# Patient Record
Sex: FEMALE | Race: WHITE | NOT HISPANIC OR LATINO | URBAN - METROPOLITAN AREA
[De-identification: names, ages, dates, MRNs, and addresses within clinical notes are randomized per-mention and may not be internally consistent; named-entity substitution may affect disease eponyms.]

---

## 2017-06-02 ENCOUNTER — EMERGENCY (EMERGENCY)
Facility: HOSPITAL | Age: 62
LOS: 1 days | Discharge: HOME | End: 2017-06-02
Attending: EMERGENCY MEDICINE | Admitting: EMERGENCY MEDICINE
Payer: COMMERCIAL

## 2017-06-02 VITALS
HEART RATE: 76 BPM | OXYGEN SATURATION: 96 % | DIASTOLIC BLOOD PRESSURE: 72 MMHG | RESPIRATION RATE: 14 BRPM | WEIGHT: 141.98 LBS | SYSTOLIC BLOOD PRESSURE: 136 MMHG | TEMPERATURE: 98 F

## 2017-06-02 DIAGNOSIS — Z90.49 ACQUIRED ABSENCE OF OTHER SPECIFIED PARTS OF DIGESTIVE TRACT: Chronic | ICD-10-CM

## 2017-06-02 DIAGNOSIS — J45.909 UNSPECIFIED ASTHMA, UNCOMPLICATED: ICD-10-CM

## 2017-06-02 DIAGNOSIS — Z98.89 OTHER SPECIFIED POSTPROCEDURAL STATES: Chronic | ICD-10-CM

## 2017-06-02 DIAGNOSIS — Z82.49 FAMILY HISTORY OF ISCHEMIC HEART DISEASE AND OTHER DISEASES OF THE CIRCULATORY SYSTEM: ICD-10-CM

## 2017-06-02 DIAGNOSIS — R10.30 LOWER ABDOMINAL PAIN, UNSPECIFIED: ICD-10-CM

## 2017-06-02 DIAGNOSIS — Z79.84 LONG TERM (CURRENT) USE OF ORAL HYPOGLYCEMIC DRUGS: ICD-10-CM

## 2017-06-02 DIAGNOSIS — E11.9 TYPE 2 DIABETES MELLITUS WITHOUT COMPLICATIONS: ICD-10-CM

## 2017-06-02 DIAGNOSIS — R10.32 LEFT LOWER QUADRANT PAIN: ICD-10-CM

## 2017-06-02 LAB
ALBUMIN SERPL ELPH-MCNC: 3.7 G/DL — SIGNIFICANT CHANGE UP (ref 3.3–5)
ALP SERPL-CCNC: 82 U/L — SIGNIFICANT CHANGE UP (ref 40–120)
ALT FLD-CCNC: 22 U/L — SIGNIFICANT CHANGE UP (ref 12–78)
AMYLASE P1 CFR SERPL: 32 U/L — SIGNIFICANT CHANGE UP (ref 25–115)
ANION GAP SERPL CALC-SCNC: 5 MMOL/L — SIGNIFICANT CHANGE UP (ref 5–17)
APPEARANCE UR: CLEAR — SIGNIFICANT CHANGE UP
APTT BLD: 33.7 SEC — SIGNIFICANT CHANGE UP (ref 27.5–37.4)
AST SERPL-CCNC: 11 U/L — LOW (ref 15–37)
BACTERIA # UR AUTO: ABNORMAL
BASOPHILS # BLD AUTO: 0.2 K/UL — SIGNIFICANT CHANGE UP (ref 0–0.2)
BASOPHILS NFR BLD AUTO: 1.6 % — SIGNIFICANT CHANGE UP (ref 0–2)
BILIRUB SERPL-MCNC: 0.2 MG/DL — SIGNIFICANT CHANGE UP (ref 0.2–1.2)
BILIRUB UR-MCNC: NEGATIVE — SIGNIFICANT CHANGE UP
BUN SERPL-MCNC: 16 MG/DL — SIGNIFICANT CHANGE UP (ref 7–23)
CALCIUM SERPL-MCNC: 9.1 MG/DL — SIGNIFICANT CHANGE UP (ref 8.5–10.1)
CHLORIDE SERPL-SCNC: 105 MMOL/L — SIGNIFICANT CHANGE UP (ref 96–108)
CO2 SERPL-SCNC: 29 MMOL/L — SIGNIFICANT CHANGE UP (ref 22–31)
COLOR SPEC: YELLOW — SIGNIFICANT CHANGE UP
CREAT SERPL-MCNC: 0.78 MG/DL — SIGNIFICANT CHANGE UP (ref 0.5–1.3)
DIFF PNL FLD: NEGATIVE — SIGNIFICANT CHANGE UP
EOSINOPHIL # BLD AUTO: 0.3 K/UL — SIGNIFICANT CHANGE UP (ref 0–0.5)
EOSINOPHIL NFR BLD AUTO: 2.6 % — SIGNIFICANT CHANGE UP (ref 0–6)
EPI CELLS # UR: SIGNIFICANT CHANGE UP
GLUCOSE SERPL-MCNC: 235 MG/DL — HIGH (ref 70–99)
GLUCOSE UR QL: 1000 MG/DL
HCT VFR BLD CALC: 42.5 % — SIGNIFICANT CHANGE UP (ref 34.5–45)
HGB BLD-MCNC: 13.9 G/DL — SIGNIFICANT CHANGE UP (ref 11.5–15.5)
INR BLD: 0.9 RATIO — SIGNIFICANT CHANGE UP (ref 0.88–1.16)
KETONES UR-MCNC: NEGATIVE — SIGNIFICANT CHANGE UP
LEUKOCYTE ESTERASE UR-ACNC: ABNORMAL
LIDOCAIN IGE QN: 143 U/L — SIGNIFICANT CHANGE UP (ref 73–393)
LYMPHOCYTES # BLD AUTO: 2 K/UL — SIGNIFICANT CHANGE UP (ref 1–3.3)
LYMPHOCYTES # BLD AUTO: 20.5 % — SIGNIFICANT CHANGE UP (ref 13–44)
MCHC RBC-ENTMCNC: 28.4 PG — SIGNIFICANT CHANGE UP (ref 27–34)
MCHC RBC-ENTMCNC: 32.6 GM/DL — SIGNIFICANT CHANGE UP (ref 32–36)
MCV RBC AUTO: 86.9 FL — SIGNIFICANT CHANGE UP (ref 80–100)
MONOCYTES # BLD AUTO: 0.7 K/UL — SIGNIFICANT CHANGE UP (ref 0–0.9)
MONOCYTES NFR BLD AUTO: 6.6 % — SIGNIFICANT CHANGE UP (ref 1–9)
NEUTROPHILS # BLD AUTO: 6.9 K/UL — SIGNIFICANT CHANGE UP (ref 1.8–7.4)
NEUTROPHILS NFR BLD AUTO: 68.6 % — SIGNIFICANT CHANGE UP (ref 43–77)
NITRITE UR-MCNC: NEGATIVE — SIGNIFICANT CHANGE UP
PH UR: 6 — SIGNIFICANT CHANGE UP (ref 5–8)
PLATELET # BLD AUTO: 312 K/UL — SIGNIFICANT CHANGE UP (ref 150–400)
POTASSIUM SERPL-MCNC: 4 MMOL/L — SIGNIFICANT CHANGE UP (ref 3.5–5.3)
POTASSIUM SERPL-SCNC: 4 MMOL/L — SIGNIFICANT CHANGE UP (ref 3.5–5.3)
PROT SERPL-MCNC: 7 G/DL — SIGNIFICANT CHANGE UP (ref 6–8.3)
PROT UR-MCNC: NEGATIVE — SIGNIFICANT CHANGE UP
PROTHROM AB SERPL-ACNC: 9.8 SEC — SIGNIFICANT CHANGE UP (ref 9.8–12.7)
RBC # BLD: 4.89 M/UL — SIGNIFICANT CHANGE UP (ref 3.8–5.2)
RBC # FLD: 12 % — SIGNIFICANT CHANGE UP (ref 10.3–14.5)
SODIUM SERPL-SCNC: 139 MMOL/L — SIGNIFICANT CHANGE UP (ref 135–145)
SP GR SPEC: 1.01 — SIGNIFICANT CHANGE UP (ref 1.01–1.02)
UROBILINOGEN FLD QL: NEGATIVE — SIGNIFICANT CHANGE UP
WBC # BLD: 10 K/UL — SIGNIFICANT CHANGE UP (ref 3.8–10.5)
WBC # FLD AUTO: 10 K/UL — SIGNIFICANT CHANGE UP (ref 3.8–10.5)
WBC UR QL: ABNORMAL

## 2017-06-02 PROCEDURE — 99285 EMERGENCY DEPT VISIT HI MDM: CPT

## 2017-06-02 PROCEDURE — 74176 CT ABD & PELVIS W/O CONTRAST: CPT | Mod: 26

## 2017-06-02 RX ORDER — IOHEXOL 300 MG/ML
30 INJECTION, SOLUTION INTRAVENOUS ONCE
Qty: 0 | Refills: 0 | Status: COMPLETED | OUTPATIENT
Start: 2017-06-02 | End: 2017-06-02

## 2017-06-02 RX ORDER — MORPHINE SULFATE 50 MG/1
4 CAPSULE, EXTENDED RELEASE ORAL ONCE
Qty: 0 | Refills: 0 | Status: DISCONTINUED | OUTPATIENT
Start: 2017-06-02 | End: 2017-06-02

## 2017-06-02 RX ORDER — SODIUM CHLORIDE 9 MG/ML
1000 INJECTION INTRAMUSCULAR; INTRAVENOUS; SUBCUTANEOUS ONCE
Qty: 0 | Refills: 0 | Status: COMPLETED | OUTPATIENT
Start: 2017-06-02 | End: 2017-06-02

## 2017-06-02 RX ORDER — ONDANSETRON 8 MG/1
4 TABLET, FILM COATED ORAL ONCE
Qty: 0 | Refills: 0 | Status: COMPLETED | OUTPATIENT
Start: 2017-06-02 | End: 2017-06-02

## 2017-06-02 RX ADMIN — ONDANSETRON 4 MILLIGRAM(S): 8 TABLET, FILM COATED ORAL at 21:00

## 2017-06-02 RX ADMIN — MORPHINE SULFATE 4 MILLIGRAM(S): 50 CAPSULE, EXTENDED RELEASE ORAL at 21:00

## 2017-06-02 RX ADMIN — IOHEXOL 30 MILLILITER(S): 300 INJECTION, SOLUTION INTRAVENOUS at 21:00

## 2017-06-02 RX ADMIN — SODIUM CHLORIDE 1000 MILLILITER(S): 9 INJECTION INTRAMUSCULAR; INTRAVENOUS; SUBCUTANEOUS at 21:00

## 2017-06-02 NOTE — ED ADULT NURSE NOTE - OBJECTIVE STATEMENT
patient c/o LLQ pain that radiates across abdomen to right with severe nausea. denies diarrhea or vomiting

## 2017-06-03 VITALS
RESPIRATION RATE: 16 BRPM | DIASTOLIC BLOOD PRESSURE: 70 MMHG | OXYGEN SATURATION: 96 % | HEART RATE: 64 BPM | SYSTOLIC BLOOD PRESSURE: 109 MMHG | TEMPERATURE: 97 F

## 2017-06-03 LAB
CULTURE RESULTS: NO GROWTH — SIGNIFICANT CHANGE UP
SPECIMEN SOURCE: SIGNIFICANT CHANGE UP

## 2017-06-03 PROCEDURE — 80053 COMPREHEN METABOLIC PANEL: CPT

## 2017-06-03 PROCEDURE — 96375 TX/PRO/DX INJ NEW DRUG ADDON: CPT

## 2017-06-03 PROCEDURE — 85610 PROTHROMBIN TIME: CPT

## 2017-06-03 PROCEDURE — 74176 CT ABD & PELVIS W/O CONTRAST: CPT

## 2017-06-03 PROCEDURE — 83690 ASSAY OF LIPASE: CPT

## 2017-06-03 PROCEDURE — 81001 URINALYSIS AUTO W/SCOPE: CPT

## 2017-06-03 PROCEDURE — 99284 EMERGENCY DEPT VISIT MOD MDM: CPT | Mod: 25

## 2017-06-03 PROCEDURE — 96374 THER/PROPH/DIAG INJ IV PUSH: CPT

## 2017-06-03 PROCEDURE — 87086 URINE CULTURE/COLONY COUNT: CPT

## 2017-06-03 PROCEDURE — 82150 ASSAY OF AMYLASE: CPT

## 2017-06-03 PROCEDURE — 85730 THROMBOPLASTIN TIME PARTIAL: CPT

## 2017-06-03 PROCEDURE — 85027 COMPLETE CBC AUTOMATED: CPT

## 2017-06-03 RX ORDER — CEFOXITIN 1 G/1
1 INJECTION, POWDER, FOR SOLUTION INTRAVENOUS
Qty: 14 | Refills: 0 | OUTPATIENT
Start: 2017-06-03 | End: 2017-06-10

## 2017-06-03 RX ORDER — ONDANSETRON 8 MG/1
1 TABLET, FILM COATED ORAL
Qty: 9 | Refills: 0 | OUTPATIENT
Start: 2017-06-03 | End: 2017-06-06

## 2017-06-03 RX ORDER — CEFUROXIME AXETIL 250 MG
500 TABLET ORAL EVERY 12 HOURS
Qty: 0 | Refills: 0 | Status: DISCONTINUED | OUTPATIENT
Start: 2017-06-03 | End: 2017-06-06

## 2017-06-03 RX ORDER — ONDANSETRON 8 MG/1
4 TABLET, FILM COATED ORAL ONCE
Qty: 0 | Refills: 0 | Status: COMPLETED | OUTPATIENT
Start: 2017-06-03 | End: 2017-06-03

## 2017-06-03 RX ADMIN — ONDANSETRON 4 MILLIGRAM(S): 8 TABLET, FILM COATED ORAL at 01:39

## 2017-06-03 RX ADMIN — MORPHINE SULFATE 4 MILLIGRAM(S): 50 CAPSULE, EXTENDED RELEASE ORAL at 00:53

## 2017-06-03 RX ADMIN — Medication 500 MILLIGRAM(S): at 01:39

## 2017-06-03 NOTE — ED PROVIDER NOTE - CHPI ED SYMPTOMS NEG
no hematuria/no abdominal distension/no burning urination/no chills/no fever/no vomiting/no diarrhea/no dysuria

## 2017-06-03 NOTE — ED PROVIDER NOTE - OBJECTIVE STATEMENT
Pt is a 63 yo female pw b/l lower abd pain since 4am, pain greater on left than right, no fc, + nausea. no a/a factors, pain has become severe so pt came to er.

## 2017-06-03 NOTE — ED ADULT NURSE REASSESSMENT NOTE - NS ED NURSE REASSESS COMMENT FT1
patient looks much improved and states to feel much better and ready to go home. Discussed with patient to f/u with PMD and GI asap, states she will call monday. Tolerated PO well, v/s stable.

## 2017-06-03 NOTE — ED PROVIDER NOTE - MEDICAL DECISION MAKING DETAILS
Pt is a 63 yo diabetic with llq abd pain for 24 hour and several days nausea.  ct neg, + ua.  abx , zofran fu gi

## 2018-09-21 ENCOUNTER — EMERGENCY (EMERGENCY)
Facility: HOSPITAL | Age: 63
LOS: 1 days | Discharge: HOME | End: 2018-09-21
Attending: EMERGENCY MEDICINE
Payer: MEDICARE

## 2018-09-21 VITALS
SYSTOLIC BLOOD PRESSURE: 144 MMHG | HEART RATE: 67 BPM | OXYGEN SATURATION: 97 % | TEMPERATURE: 98 F | DIASTOLIC BLOOD PRESSURE: 72 MMHG | RESPIRATION RATE: 18 BRPM

## 2018-09-21 DIAGNOSIS — Z90.49 ACQUIRED ABSENCE OF OTHER SPECIFIED PARTS OF DIGESTIVE TRACT: Chronic | ICD-10-CM

## 2018-09-21 DIAGNOSIS — Z98.89 OTHER SPECIFIED POSTPROCEDURAL STATES: Chronic | ICD-10-CM

## 2018-09-21 PROCEDURE — 71101 X-RAY EXAM UNILAT RIBS/CHEST: CPT

## 2018-09-21 PROCEDURE — 99284 EMERGENCY DEPT VISIT MOD MDM: CPT | Mod: 25

## 2018-09-21 PROCEDURE — 71101 X-RAY EXAM UNILAT RIBS/CHEST: CPT | Mod: 26

## 2018-09-21 PROCEDURE — 99284 EMERGENCY DEPT VISIT MOD MDM: CPT

## 2018-09-21 RX ORDER — LIDOCAINE 4 G/100G
1 CREAM TOPICAL ONCE
Qty: 0 | Refills: 0 | Status: COMPLETED | OUTPATIENT
Start: 2018-09-21 | End: 2018-09-21

## 2018-09-21 RX ORDER — LIDOCAINE 4 G/100G
1 CREAM TOPICAL
Qty: 7 | Refills: 0 | OUTPATIENT
Start: 2018-09-21 | End: 2018-09-27

## 2018-09-21 RX ORDER — IBUPROFEN 200 MG
600 TABLET ORAL ONCE
Qty: 0 | Refills: 0 | Status: COMPLETED | OUTPATIENT
Start: 2018-09-21 | End: 2018-09-21

## 2018-09-21 RX ORDER — CYCLOBENZAPRINE HYDROCHLORIDE 10 MG/1
1 TABLET, FILM COATED ORAL
Qty: 15 | Refills: 0 | OUTPATIENT
Start: 2018-09-21 | End: 2018-09-25

## 2018-09-21 RX ORDER — ACETAMINOPHEN 500 MG
650 TABLET ORAL ONCE
Qty: 0 | Refills: 0 | Status: COMPLETED | OUTPATIENT
Start: 2018-09-21 | End: 2018-09-21

## 2018-09-21 RX ORDER — BUDESONIDE AND FORMOTEROL FUMARATE DIHYDRATE 160; 4.5 UG/1; UG/1
2 AEROSOL RESPIRATORY (INHALATION)
Qty: 0 | Refills: 0 | COMMUNITY

## 2018-09-21 RX ADMIN — Medication 600 MILLIGRAM(S): at 07:51

## 2018-09-21 RX ADMIN — LIDOCAINE 1 PATCH: 4 CREAM TOPICAL at 07:52

## 2018-09-21 RX ADMIN — Medication 650 MILLIGRAM(S): at 07:51

## 2018-09-21 NOTE — ED ADULT TRIAGE NOTE - CHIEF COMPLAINT QUOTE
patient slipped getting out of a pool last sunday when she fell onto her left ribs c/o pain getting worse

## 2018-09-21 NOTE — ED ADULT NURSE NOTE - CHPI ED NUR SYMPTOMS NEG
no weakness/no difficulty bearing weight/no tingling/no fever/no abrasion/no back pain/no deformity/no stiffness/no bruising/no numbness

## 2018-09-21 NOTE — ED ADULT NURSE NOTE - CHIEF COMPLAINT QUOTE
patient slipped getting out of a pool last sunday when she fell onto her left ribs c/o pain getting worse, also c/o rash to neck and arm

## 2018-09-21 NOTE — ED PROVIDER NOTE - CARE PLAN
Principal Discharge DX:	Muscle strain Principal Discharge DX:	Muscle strain  Secondary Diagnosis:	Dermatitis

## 2018-09-21 NOTE — ED ADULT NURSE NOTE - OBJECTIVE STATEMENT
63 year old female presents to the ED complaining of rib pain. As per patient she fell while in the pool 5 days ago. Patient hit her left side and is complaining of persistent, worsening left lower rib pain. Today patient reports pain heightened which brought her to the ED for further evaluation. Ambulatory with a steady gait. Denies chest pain/shortness of breath. Denies nausea/vomiting. Patient applied heating pad for relief and also has been taken advil and tylenol - some mild relief only.

## 2018-09-21 NOTE — ED PROVIDER NOTE - PROGRESS NOTE DETAILS
All results were explained to patient and/or family and a copy of all available results given. All results were explained to patient and/or family and a copy of all available results given.  Advise cortisone otc and miguel angel c dermatoloist within 48 hours.

## 2018-09-21 NOTE — ED PROVIDER NOTE - OBJECTIVE STATEMENT
left lower rib and lat side x  5 days while trying to jump out of the pool and hit against the edge.   pt has been using Icy and heat pad , Advil and Tylenol c initial improvement, but got worse yesterday. left lower rib and lat side x  5 days while trying to jump out of the pool and hit against the edge.   pt has been using Icy and heat pad , Advil and Tylenol c initial improvement, but got worse yesterday.  pt also complained of itchy scalp rash intermittently for 6 months .  no other complaint.

## 2018-09-21 NOTE — ED ADULT NURSE NOTE - NSIMPLEMENTINTERV_GEN_ALL_ED
Implemented All Fall Risk Interventions:  [place] to call system. Call bell, personal items and telephone within reach. Instruct patient to call for assistance. Room bathroom lighting operational. Non-slip footwear when patient is off stretcher. Physically safe environment: no spills, clutter or unnecessary equipment. Stretcher in lowest position, wheels locked, appropriate side rails in place. Provide visual cue, wrist band, yellow gown, etc. Monitor gait and stability. Monitor for mental status changes and reorient to person, place, and time. Review medications for side effects contributing to fall risk. Reinforce activity limits and safety measures with patient and family.

## 2018-11-11 ENCOUNTER — EMERGENCY (EMERGENCY)
Facility: HOSPITAL | Age: 63
LOS: 1 days | Discharge: HOME | End: 2018-11-11
Attending: EMERGENCY MEDICINE
Payer: MEDICARE

## 2018-11-11 VITALS
RESPIRATION RATE: 15 BRPM | HEIGHT: 67 IN | SYSTOLIC BLOOD PRESSURE: 146 MMHG | WEIGHT: 147.93 LBS | HEART RATE: 81 BPM | OXYGEN SATURATION: 97 % | TEMPERATURE: 98 F | DIASTOLIC BLOOD PRESSURE: 82 MMHG

## 2018-11-11 VITALS
SYSTOLIC BLOOD PRESSURE: 140 MMHG | TEMPERATURE: 98 F | HEART RATE: 80 BPM | OXYGEN SATURATION: 97 % | RESPIRATION RATE: 16 BRPM | DIASTOLIC BLOOD PRESSURE: 79 MMHG

## 2018-11-11 DIAGNOSIS — Z90.49 ACQUIRED ABSENCE OF OTHER SPECIFIED PARTS OF DIGESTIVE TRACT: Chronic | ICD-10-CM

## 2018-11-11 DIAGNOSIS — Z98.89 OTHER SPECIFIED POSTPROCEDURAL STATES: Chronic | ICD-10-CM

## 2018-11-11 PROCEDURE — 73090 X-RAY EXAM OF FOREARM: CPT | Mod: 26,LT

## 2018-11-11 PROCEDURE — 29125 APPL SHORT ARM SPLINT STATIC: CPT

## 2018-11-11 PROCEDURE — 73090 X-RAY EXAM OF FOREARM: CPT

## 2018-11-11 PROCEDURE — 73110 X-RAY EXAM OF WRIST: CPT | Mod: 26,RT

## 2018-11-11 PROCEDURE — 29125 APPL SHORT ARM SPLINT STATIC: CPT | Mod: RT

## 2018-11-11 PROCEDURE — 99283 EMERGENCY DEPT VISIT LOW MDM: CPT

## 2018-11-11 PROCEDURE — 73110 X-RAY EXAM OF WRIST: CPT

## 2018-11-11 PROCEDURE — 99284 EMERGENCY DEPT VISIT MOD MDM: CPT | Mod: 25

## 2018-11-11 RX ORDER — IBUPROFEN 200 MG
600 TABLET ORAL ONCE
Qty: 0 | Refills: 0 | Status: COMPLETED | OUTPATIENT
Start: 2018-11-11 | End: 2018-11-11

## 2018-11-11 RX ADMIN — Medication 600 MILLIGRAM(S): at 13:28

## 2018-11-11 RX ADMIN — Medication 600 MILLIGRAM(S): at 13:58

## 2018-11-11 NOTE — ED PROVIDER NOTE - OBJECTIVE STATEMENT
presents with right forearm and wrist pain. tripped over a gate at home about 1 hour ago and landed on right wrist and forearm. pain 7/10. has not taken anything for pain or symptoms. denies hitting head or loc. does not take any blood thinners. no HA, dizziness, confusion, memory loss, neck or back pain. right handed  PCP Lb Sol

## 2018-11-11 NOTE — ED PROVIDER NOTE - MEDICAL DECISION MAKING DETAILS
Josselin: See attending statement below Josselin: See attending statement below  right wrist pain and forearm pain after fall. will x-ray. no vert tenderness to suggest vert fx. no Ha or neuro deficits. do not suspect ICH or skull fx. will give motrin for pain. due to tenderness over anatomical snuffbox, will plan to apply thumb spica splint

## 2018-11-11 NOTE — ED PROVIDER NOTE - ATTENDING CONTRIBUTION TO CARE
63y F here with R wrist forearm pain sp trip and fall landing on outstretched R hand. Pt c/o pain and swelling to R wrist greatest along distal ulnar aspect. Pain is worse w mvmt and palp. No other injuries. Periph NV intact. dorsum R hand with circular area of swelling which appears to move along with flexion/ext of digits (suspect ganglion cyst) which pt states present prior to fall. Pain extends from wrost tomid forearm. Will obtain Xrays to R/o fracture. Will give hand FU for eval of suspected cyst.

## 2018-11-11 NOTE — ED ADULT NURSE NOTE - OBJECTIVE STATEMENT
Pt presents to the fast track area s/p trip and fall c/o right wrist pain, + sensation, + tenderness, + mobility of the fingers, + capillary refill < 2 sec, + radial pulses, skin warm and dry.

## 2018-11-11 NOTE — ED ADULT TRIAGE NOTE - CHIEF COMPLAINT QUOTE
trip and fall this morning, patient complains of right wrist pain. denies loss of consciousness denies hitting her head

## 2018-11-11 NOTE — ED PROVIDER NOTE - PROGRESS NOTE DETAILS
Reevaluated patient at bedside.  Patient feeling improved after splint applied (splint applied due to tenderness over anatomical snuffbox). will follow up with ortho 7 days for recheck of anatomical snuffbox tenderness. Discussed the results of all diagnostic testing in ED and copies of all reports given.   An opportunity to ask questions was given.  Discussed the importance of prompt, close medical follow-up.  Patient will return with any changes, concerns or persistent / worsening symptoms.  Understanding of all instructions verbalized.

## 2018-11-11 NOTE — ED ADULT NURSE NOTE - NSIMPLEMENTINTERV_GEN_ALL_ED
Implemented All Universal Safety Interventions:  [place] to call system. Call bell, personal items and telephone within reach. Instruct patient to call for assistance. Room bathroom lighting operational. Non-slip footwear when patient is off stretcher. Physically safe environment: no spills, clutter or unnecessary equipment. Stretcher in lowest position, wheels locked, appropriate side rails in place.

## 2018-11-11 NOTE — ED PROVIDER NOTE - MUSCULOSKELETAL, MLM
diffuse tenderness to right wrist and forearm. no obvious swelling or deformity. mild tenderness over anatomical snuffbox

## 2018-11-26 ENCOUNTER — INPATIENT (INPATIENT)
Facility: HOSPITAL | Age: 63
LOS: 3 days | Discharge: HOME | DRG: 66 | End: 2018-11-30
Attending: INTERNAL MEDICINE | Admitting: INTERNAL MEDICINE
Payer: MEDICARE

## 2018-11-26 VITALS
OXYGEN SATURATION: 100 % | TEMPERATURE: 98 F | WEIGHT: 145.95 LBS | HEART RATE: 81 BPM | RESPIRATION RATE: 14 BRPM | SYSTOLIC BLOOD PRESSURE: 130 MMHG | HEIGHT: 67 IN | DIASTOLIC BLOOD PRESSURE: 78 MMHG

## 2018-11-26 DIAGNOSIS — Z98.89 OTHER SPECIFIED POSTPROCEDURAL STATES: Chronic | ICD-10-CM

## 2018-11-26 DIAGNOSIS — R73.9 HYPERGLYCEMIA, UNSPECIFIED: ICD-10-CM

## 2018-11-26 DIAGNOSIS — Z90.49 ACQUIRED ABSENCE OF OTHER SPECIFIED PARTS OF DIGESTIVE TRACT: Chronic | ICD-10-CM

## 2018-11-26 LAB
ALBUMIN SERPL ELPH-MCNC: 3.4 G/DL — SIGNIFICANT CHANGE UP (ref 3.3–5)
ALP SERPL-CCNC: 93 U/L — SIGNIFICANT CHANGE UP (ref 40–120)
ALT FLD-CCNC: 26 U/L — SIGNIFICANT CHANGE UP (ref 12–78)
ANION GAP SERPL CALC-SCNC: 6 MMOL/L — SIGNIFICANT CHANGE UP (ref 5–17)
AST SERPL-CCNC: 9 U/L — LOW (ref 15–37)
BASOPHILS # BLD AUTO: 0.1 K/UL — SIGNIFICANT CHANGE UP (ref 0–0.2)
BASOPHILS NFR BLD AUTO: 1.1 % — SIGNIFICANT CHANGE UP (ref 0–2)
BILIRUB SERPL-MCNC: 0.2 MG/DL — SIGNIFICANT CHANGE UP (ref 0.2–1.2)
BUN SERPL-MCNC: 17 MG/DL — SIGNIFICANT CHANGE UP (ref 7–23)
CALCIUM SERPL-MCNC: 8.5 MG/DL — SIGNIFICANT CHANGE UP (ref 8.5–10.1)
CHLORIDE SERPL-SCNC: 102 MMOL/L — SIGNIFICANT CHANGE UP (ref 96–108)
CO2 SERPL-SCNC: 30 MMOL/L — SIGNIFICANT CHANGE UP (ref 22–31)
CREAT SERPL-MCNC: 0.99 MG/DL — SIGNIFICANT CHANGE UP (ref 0.5–1.3)
EOSINOPHIL # BLD AUTO: 0.45 K/UL — SIGNIFICANT CHANGE UP (ref 0–0.5)
EOSINOPHIL NFR BLD AUTO: 5.1 % — SIGNIFICANT CHANGE UP (ref 0–6)
GLUCOSE SERPL-MCNC: 338 MG/DL — HIGH (ref 70–99)
HCT VFR BLD CALC: 41.1 % — SIGNIFICANT CHANGE UP (ref 34.5–45)
HGB BLD-MCNC: 13 G/DL — SIGNIFICANT CHANGE UP (ref 11.5–15.5)
IMM GRANULOCYTES NFR BLD AUTO: 0.2 % — SIGNIFICANT CHANGE UP (ref 0–1.5)
LYMPHOCYTES # BLD AUTO: 3.6 K/UL — HIGH (ref 1–3.3)
LYMPHOCYTES # BLD AUTO: 40.7 % — SIGNIFICANT CHANGE UP (ref 13–44)
MCHC RBC-ENTMCNC: 27 PG — SIGNIFICANT CHANGE UP (ref 27–34)
MCHC RBC-ENTMCNC: 31.6 GM/DL — LOW (ref 32–36)
MCV RBC AUTO: 85.4 FL — SIGNIFICANT CHANGE UP (ref 80–100)
MONOCYTES # BLD AUTO: 0.69 K/UL — SIGNIFICANT CHANGE UP (ref 0–0.9)
MONOCYTES NFR BLD AUTO: 7.8 % — SIGNIFICANT CHANGE UP (ref 2–14)
NEUTROPHILS # BLD AUTO: 3.98 K/UL — SIGNIFICANT CHANGE UP (ref 1.8–7.4)
NEUTROPHILS NFR BLD AUTO: 45.1 % — SIGNIFICANT CHANGE UP (ref 43–77)
PLATELET # BLD AUTO: 367 K/UL — SIGNIFICANT CHANGE UP (ref 150–400)
POTASSIUM SERPL-MCNC: 4.6 MMOL/L — SIGNIFICANT CHANGE UP (ref 3.5–5.3)
POTASSIUM SERPL-SCNC: 4.6 MMOL/L — SIGNIFICANT CHANGE UP (ref 3.5–5.3)
PROT SERPL-MCNC: 6.9 G/DL — SIGNIFICANT CHANGE UP (ref 6–8.3)
RBC # BLD: 4.81 M/UL — SIGNIFICANT CHANGE UP (ref 3.8–5.2)
RBC # FLD: 12.6 % — SIGNIFICANT CHANGE UP (ref 10.3–14.5)
SODIUM SERPL-SCNC: 138 MMOL/L — SIGNIFICANT CHANGE UP (ref 135–145)
TROPONIN I SERPL-MCNC: <.015 NG/ML — SIGNIFICANT CHANGE UP (ref 0.01–0.04)
WBC # BLD: 8.84 K/UL — SIGNIFICANT CHANGE UP (ref 3.8–10.5)
WBC # FLD AUTO: 8.84 K/UL — SIGNIFICANT CHANGE UP (ref 3.8–10.5)

## 2018-11-26 PROCEDURE — 99285 EMERGENCY DEPT VISIT HI MDM: CPT

## 2018-11-26 PROCEDURE — 70450 CT HEAD/BRAIN W/O DYE: CPT | Mod: 26

## 2018-11-26 PROCEDURE — 93010 ELECTROCARDIOGRAM REPORT: CPT

## 2018-11-26 RX ORDER — INSULIN LISPRO 100/ML
6 VIAL (ML) SUBCUTANEOUS ONCE
Qty: 0 | Refills: 0 | Status: COMPLETED | OUTPATIENT
Start: 2018-11-26 | End: 2018-11-26

## 2018-11-26 RX ORDER — SODIUM CHLORIDE 9 MG/ML
1000 INJECTION INTRAMUSCULAR; INTRAVENOUS; SUBCUTANEOUS ONCE
Qty: 0 | Refills: 0 | Status: COMPLETED | OUTPATIENT
Start: 2018-11-26 | End: 2018-11-26

## 2018-11-26 RX ADMIN — Medication 6 UNIT(S): at 21:19

## 2018-11-26 RX ADMIN — SODIUM CHLORIDE 1000 MILLILITER(S): 9 INJECTION INTRAMUSCULAR; INTRAVENOUS; SUBCUTANEOUS at 20:41

## 2018-11-26 NOTE — ED ADULT NURSE NOTE - OBJECTIVE STATEMENT
patient a/o x 4 with a calm affect c/o hyperglycemia,  in triage, with left facial numbness/tingling and left extremity numbness and tingling.  patient has full range of motion with no facial droop, and bilateral facial symmetry.  EKG completed, pending initial lab results and head CT

## 2018-11-26 NOTE — ED ADULT TRIAGE NOTE - CHIEF COMPLAINT QUOTE
"I have numbness and tingling on my face."  pt c/o numbness and tingling left side of face and down left arm, began around 1800, also states BS was 413 at home, BS in triage 382

## 2018-11-26 NOTE — ED PROVIDER NOTE - ATTENDING CONTRIBUTION TO CARE
Pt seen and examined and d/w PA.  agree with a and p.  pt is a 62 yo female hx oa, dm. pt states recent sugars 400 today. pt denies ha, f,c, neck pain weakness of arms or leg but states that she began at 6pm with paresthesia of left face, arm and leg. on exam cn 2-12 intact, speech fluent, no pronator drift, motor 4+/5 bl,  slight decreased light touch diffusely on left side.  tx glucose, hct no acute findings,  nihss 1, swallow wnl.  ? tia/cva mild  vs glucose elevation v other pathology causing paresthesia iwht no weakness.  admit tele, check labs, neuro consult am

## 2018-11-26 NOTE — ED PROVIDER NOTE - OBJECTIVE STATEMENT
63 y female PMH: asthma, diabetes, presents with episode of numbness, tingling,  to left side of face, arm and left leg,  states she checked her finger stick it was over 400,  came to ED.  states she did not have any pain, no slurred speech.  states no recent illness.  no fever, no cough, no chest pain, no sob.  PMD Dr Lb Sol

## 2018-11-26 NOTE — ED ADULT NURSE NOTE - NS ED NURSE TRANSPORT WITH
Cpt Code (78872, 07762 Or 97413): 29913 Cpt Code: 87220 Cardiac Monitor/Defib/ACLS/Rescue Kit/O2/BVM

## 2018-11-26 NOTE — ED ADULT NURSE NOTE - NSIMPLEMENTINTERV_GEN_ALL_ED
Implemented All Universal Safety Interventions:  [place] to call system. Call bell, personal items and telephone within reach. Instruct patient to call for assistance. Room bathroom lighting operational. Non-slip footwear when patient is off stretcher. Physically safe environment: no spills, clutter or unnecessary equipment. Stretcher in lowest position, wheels locked, appropriate side rails in place.

## 2018-11-26 NOTE — H&P ADULT - NSHPPHYSICALEXAM_GEN_ALL_CORE
Vital Signs (24 Hrs):  T(C): 36.9 (11-26-18 @ 19:43), Max: 36.9 (11-26-18 @ 19:43)  HR: 81 (11-26-18 @ 19:43) (81 - 81)  BP: 130/78 (11-26-18 @ 19:43) (130/78 - 130/78)  RR: 14 (11-26-18 @ 19:43) (14 - 14)  SpO2: 100% (11-26-18 @ 19:43) (100% - 100%)

## 2018-11-26 NOTE — ED PROVIDER NOTE - PROGRESS NOTE DETAILS
spoke with Dr Ochoa case discussed,results discussed,  will see patient on admission spoke with Dr Greg Reyes, case discussed, will admit patient dr flynn actually covered by dr helm, d/w dr roldan, change admit to frank

## 2018-11-27 DIAGNOSIS — Z29.9 ENCOUNTER FOR PROPHYLACTIC MEASURES, UNSPECIFIED: ICD-10-CM

## 2018-11-27 DIAGNOSIS — R73.9 HYPERGLYCEMIA, UNSPECIFIED: ICD-10-CM

## 2018-11-27 DIAGNOSIS — G45.9 TRANSIENT CEREBRAL ISCHEMIC ATTACK, UNSPECIFIED: ICD-10-CM

## 2018-11-27 DIAGNOSIS — M19.90 UNSPECIFIED OSTEOARTHRITIS, UNSPECIFIED SITE: ICD-10-CM

## 2018-11-27 LAB
ALBUMIN SERPL ELPH-MCNC: 3 G/DL — LOW (ref 3.3–5)
ALP SERPL-CCNC: 79 U/L — SIGNIFICANT CHANGE UP (ref 40–120)
ALT FLD-CCNC: 23 U/L — SIGNIFICANT CHANGE UP (ref 12–78)
ANION GAP SERPL CALC-SCNC: 8 MMOL/L — SIGNIFICANT CHANGE UP (ref 5–17)
AST SERPL-CCNC: 9 U/L — LOW (ref 15–37)
BILIRUB SERPL-MCNC: 0.1 MG/DL — LOW (ref 0.2–1.2)
BUN SERPL-MCNC: 15 MG/DL — SIGNIFICANT CHANGE UP (ref 7–23)
CALCIUM SERPL-MCNC: 8.5 MG/DL — SIGNIFICANT CHANGE UP (ref 8.5–10.1)
CHLORIDE SERPL-SCNC: 109 MMOL/L — HIGH (ref 96–108)
CHOLEST SERPL-MCNC: 205 MG/DL — HIGH (ref 10–199)
CK SERPL-CCNC: 58 U/L — SIGNIFICANT CHANGE UP (ref 26–192)
CO2 SERPL-SCNC: 27 MMOL/L — SIGNIFICANT CHANGE UP (ref 22–31)
CREAT SERPL-MCNC: 0.71 MG/DL — SIGNIFICANT CHANGE UP (ref 0.5–1.3)
GLUCOSE SERPL-MCNC: 124 MG/DL — HIGH (ref 70–99)
HBA1C BLD-MCNC: 10.4 % — HIGH (ref 4–5.6)
HCT VFR BLD CALC: 39.5 % — SIGNIFICANT CHANGE UP (ref 34.5–45)
HDLC SERPL-MCNC: 47 MG/DL — LOW
HGB BLD-MCNC: 12.6 G/DL — SIGNIFICANT CHANGE UP (ref 11.5–15.5)
INR BLD: 0.92 RATIO — SIGNIFICANT CHANGE UP (ref 0.88–1.16)
LIPID PNL WITH DIRECT LDL SERPL: 132 MG/DL — HIGH
MAGNESIUM SERPL-MCNC: 2 MG/DL — SIGNIFICANT CHANGE UP (ref 1.6–2.6)
MCHC RBC-ENTMCNC: 27.2 PG — SIGNIFICANT CHANGE UP (ref 27–34)
MCHC RBC-ENTMCNC: 31.9 GM/DL — LOW (ref 32–36)
MCV RBC AUTO: 85.3 FL — SIGNIFICANT CHANGE UP (ref 80–100)
NRBC # BLD: 0 /100 WBCS — SIGNIFICANT CHANGE UP (ref 0–0)
PLATELET # BLD AUTO: 326 K/UL — SIGNIFICANT CHANGE UP (ref 150–400)
POTASSIUM SERPL-MCNC: 3.8 MMOL/L — SIGNIFICANT CHANGE UP (ref 3.5–5.3)
POTASSIUM SERPL-SCNC: 3.8 MMOL/L — SIGNIFICANT CHANGE UP (ref 3.5–5.3)
PROT SERPL-MCNC: 6.1 G/DL — SIGNIFICANT CHANGE UP (ref 6–8.3)
PROTHROM AB SERPL-ACNC: 10.4 SEC — SIGNIFICANT CHANGE UP (ref 10–12.9)
RBC # BLD: 4.63 M/UL — SIGNIFICANT CHANGE UP (ref 3.8–5.2)
RBC # FLD: 12.7 % — SIGNIFICANT CHANGE UP (ref 10.3–14.5)
SODIUM SERPL-SCNC: 144 MMOL/L — SIGNIFICANT CHANGE UP (ref 135–145)
TOTAL CHOLESTEROL/HDL RATIO MEASUREMENT: 4.4 RATIO — SIGNIFICANT CHANGE UP (ref 3.3–7.1)
TRIGL SERPL-MCNC: 131 MG/DL — SIGNIFICANT CHANGE UP (ref 10–149)
TROPONIN I SERPL-MCNC: 0.02 NG/ML — SIGNIFICANT CHANGE UP (ref 0.01–0.04)
TROPONIN I SERPL-MCNC: <.015 NG/ML — SIGNIFICANT CHANGE UP (ref 0.01–0.04)
TSH SERPL-MCNC: 1.58 UIU/ML — SIGNIFICANT CHANGE UP (ref 0.36–3.74)
WBC # BLD: 8.75 K/UL — SIGNIFICANT CHANGE UP (ref 3.8–10.5)
WBC # FLD AUTO: 8.75 K/UL — SIGNIFICANT CHANGE UP (ref 3.8–10.5)

## 2018-11-27 PROCEDURE — 93880 EXTRACRANIAL BILAT STUDY: CPT | Mod: 26

## 2018-11-27 PROCEDURE — 70544 MR ANGIOGRAPHY HEAD W/O DYE: CPT | Mod: 26,59

## 2018-11-27 PROCEDURE — 71045 X-RAY EXAM CHEST 1 VIEW: CPT | Mod: 26

## 2018-11-27 PROCEDURE — 70551 MRI BRAIN STEM W/O DYE: CPT | Mod: 26

## 2018-11-27 RX ORDER — ASPIRIN/CALCIUM CARB/MAGNESIUM 324 MG
325 TABLET ORAL DAILY
Qty: 0 | Refills: 0 | Status: DISCONTINUED | OUTPATIENT
Start: 2018-11-27 | End: 2018-11-27

## 2018-11-27 RX ORDER — HEPARIN SODIUM 5000 [USP'U]/ML
5000 INJECTION INTRAVENOUS; SUBCUTANEOUS EVERY 8 HOURS
Qty: 0 | Refills: 0 | Status: DISCONTINUED | OUTPATIENT
Start: 2018-11-27 | End: 2018-11-30

## 2018-11-27 RX ORDER — DEXTROSE 50 % IN WATER 50 %
25 SYRINGE (ML) INTRAVENOUS ONCE
Qty: 0 | Refills: 0 | Status: DISCONTINUED | OUTPATIENT
Start: 2018-11-27 | End: 2018-11-30

## 2018-11-27 RX ORDER — BUDESONIDE, MICRONIZED 100 %
0.5 POWDER (GRAM) MISCELLANEOUS
Qty: 0 | Refills: 0 | Status: DISCONTINUED | OUTPATIENT
Start: 2018-11-27 | End: 2018-11-30

## 2018-11-27 RX ORDER — IPRATROPIUM/ALBUTEROL SULFATE 18-103MCG
3 AEROSOL WITH ADAPTER (GRAM) INHALATION EVERY 6 HOURS
Qty: 0 | Refills: 0 | Status: DISCONTINUED | OUTPATIENT
Start: 2018-11-27 | End: 2018-11-30

## 2018-11-27 RX ORDER — INSULIN LISPRO 100/ML
VIAL (ML) SUBCUTANEOUS
Qty: 0 | Refills: 0 | Status: DISCONTINUED | OUTPATIENT
Start: 2018-11-27 | End: 2018-11-28

## 2018-11-27 RX ORDER — ASPIRIN/CALCIUM CARB/MAGNESIUM 324 MG
325 TABLET ORAL DAILY
Qty: 0 | Refills: 0 | Status: DISCONTINUED | OUTPATIENT
Start: 2018-11-27 | End: 2018-11-30

## 2018-11-27 RX ORDER — DEXTROSE 50 % IN WATER 50 %
15 SYRINGE (ML) INTRAVENOUS ONCE
Qty: 0 | Refills: 0 | Status: DISCONTINUED | OUTPATIENT
Start: 2018-11-27 | End: 2018-11-30

## 2018-11-27 RX ORDER — FAMOTIDINE 10 MG/ML
20 INJECTION INTRAVENOUS DAILY
Qty: 0 | Refills: 0 | Status: DISCONTINUED | OUTPATIENT
Start: 2018-11-27 | End: 2018-11-30

## 2018-11-27 RX ORDER — GLUCAGON INJECTION, SOLUTION 0.5 MG/.1ML
1 INJECTION, SOLUTION SUBCUTANEOUS ONCE
Qty: 0 | Refills: 0 | Status: DISCONTINUED | OUTPATIENT
Start: 2018-11-27 | End: 2018-11-30

## 2018-11-27 RX ORDER — INSULIN LISPRO 100/ML
VIAL (ML) SUBCUTANEOUS AT BEDTIME
Qty: 0 | Refills: 0 | Status: DISCONTINUED | OUTPATIENT
Start: 2018-11-27 | End: 2018-11-27

## 2018-11-27 RX ORDER — SIMVASTATIN 20 MG/1
10 TABLET, FILM COATED ORAL AT BEDTIME
Qty: 0 | Refills: 0 | Status: DISCONTINUED | OUTPATIENT
Start: 2018-11-27 | End: 2018-11-29

## 2018-11-27 RX ORDER — SODIUM CHLORIDE 9 MG/ML
1000 INJECTION, SOLUTION INTRAVENOUS
Qty: 0 | Refills: 0 | Status: DISCONTINUED | OUTPATIENT
Start: 2018-11-27 | End: 2018-11-30

## 2018-11-27 RX ORDER — METFORMIN HYDROCHLORIDE 850 MG/1
1000 TABLET ORAL
Qty: 0 | Refills: 0 | Status: DISCONTINUED | OUTPATIENT
Start: 2018-11-27 | End: 2018-11-27

## 2018-11-27 RX ORDER — DEXTROSE 50 % IN WATER 50 %
12.5 SYRINGE (ML) INTRAVENOUS ONCE
Qty: 0 | Refills: 0 | Status: DISCONTINUED | OUTPATIENT
Start: 2018-11-27 | End: 2018-11-30

## 2018-11-27 RX ORDER — SODIUM CHLORIDE 9 MG/ML
1000 INJECTION INTRAMUSCULAR; INTRAVENOUS; SUBCUTANEOUS
Qty: 0 | Refills: 0 | Status: COMPLETED | OUTPATIENT
Start: 2018-11-27 | End: 2018-11-28

## 2018-11-27 RX ORDER — ACETAMINOPHEN 500 MG
650 TABLET ORAL EVERY 6 HOURS
Qty: 0 | Refills: 0 | Status: DISCONTINUED | OUTPATIENT
Start: 2018-11-27 | End: 2018-11-27

## 2018-11-27 RX ORDER — ACETAMINOPHEN 500 MG
650 TABLET ORAL EVERY 8 HOURS
Qty: 0 | Refills: 0 | Status: COMPLETED | OUTPATIENT
Start: 2018-11-27 | End: 2018-11-29

## 2018-11-27 RX ADMIN — FAMOTIDINE 20 MILLIGRAM(S): 10 INJECTION INTRAVENOUS at 11:31

## 2018-11-27 RX ADMIN — HEPARIN SODIUM 5000 UNIT(S): 5000 INJECTION INTRAVENOUS; SUBCUTANEOUS at 21:37

## 2018-11-27 RX ADMIN — Medication 325 MILLIGRAM(S): at 02:55

## 2018-11-27 RX ADMIN — SODIUM CHLORIDE 75 MILLILITER(S): 9 INJECTION INTRAMUSCULAR; INTRAVENOUS; SUBCUTANEOUS at 11:31

## 2018-11-27 RX ADMIN — Medication 1 MILLIGRAM(S): at 13:30

## 2018-11-27 RX ADMIN — SIMVASTATIN 10 MILLIGRAM(S): 20 TABLET, FILM COATED ORAL at 21:37

## 2018-11-27 RX ADMIN — Medication 0.5 MILLIGRAM(S): at 19:44

## 2018-11-27 RX ADMIN — HEPARIN SODIUM 5000 UNIT(S): 5000 INJECTION INTRAVENOUS; SUBCUTANEOUS at 02:55

## 2018-11-27 RX ADMIN — Medication 650 MILLIGRAM(S): at 21:37

## 2018-11-27 RX ADMIN — Medication 650 MILLIGRAM(S): at 22:30

## 2018-11-27 RX ADMIN — Medication 0.5 MILLIGRAM(S): at 08:04

## 2018-11-27 RX ADMIN — HEPARIN SODIUM 5000 UNIT(S): 5000 INJECTION INTRAVENOUS; SUBCUTANEOUS at 11:31

## 2018-11-27 RX ADMIN — Medication 650 MILLIGRAM(S): at 22:20

## 2018-11-27 RX ADMIN — Medication 1: at 17:45

## 2018-11-27 NOTE — H&P ADULT - RS GEN PE MLT RESP DETAILS PC
normal/airway patent/breath sounds equal/good air movement/no chest wall tenderness/respirations non-labored/clear to auscultation bilaterally

## 2018-11-27 NOTE — H&P ADULT - HISTORY OF PRESENT ILLNESS
NOTE IN PROGRESS    Pt is a 62 yo F with PMH of DM, ___ who presents to the ED with ___ facial numbness that began at __.    In ED Pt's vitals were: T(C): 36.9, HR: 81, BP: 130/78, RR: 14, SpO2: 100% on RA  Labs significant for , repeat . CBC, CMP grossly wnl.  CT Head - chronic infarcts in the right MCA territory; chronic microvascular changes  EKG -     Pt given 1L NS bolus and 6 units humalog.
63 y emale PMH: copd/asthma, diabetes, OA ,hx of knee ,back and shoulder sx presents with episode of numbness, tingling,  to left side of face, arm and left leg,  states she checked her finger stick it was over 400,  came to ED.  states she did not have any pain, no slurred speech.  states no recent illness , no fever, no cough, no chest pain, no sob ,admits arthralgia and hand joints deformities and swelling of digits of left hand for a while  ,but didnt seek for medical help or rheumatology eval  .CT brain reveled old infarcts ,no acute pathology .Neurology evaluation is requested Seen by cardiologist for BP control .Admit to telemetry unit for monitoring, send 3 sets of cardiac enzymes to rule out coronary event ,obtain ECHO to evaluate LVEF ,cardiology consult, continue current managmnet,O2 supply, anticoagulation plan as per cardiology consult .Palliative care consult requested ,to discuss advance directives and complete MOLST. Patient admits sometimes  using cane for ambulation .Her sister is at bedside and is very upset ,that "patient doesn't take care of herself and doesn't go to see doctors"

## 2018-11-27 NOTE — H&P ADULT - NEGATIVE NEUROLOGICAL SYMPTOMS
no confusion/no transient paralysis/no generalized seizures/no vertigo/no syncope/no tremors/no loss of consciousness/no headache/no focal seizures

## 2018-11-27 NOTE — CONSULT NOTE ADULT - SUBJECTIVE AND OBJECTIVE BOX
Patient is a 63y old  Female who presents with a chief complaint of Hyperglycemia (27 Nov 2018 07:06)      Reason For Consult: dm2 uncontrolled    HPI:      PAST MEDICAL & SURGICAL HISTORY:  Asthma  Diabetes mellitus  H/O shoulder surgery  History of back surgery  S/P cholecystectomy  H/O knee surgery      FAMILY HISTORY:  Family history of premature CAD (Mother)        Social History:    MEDICATIONS  (STANDING):  aspirin 325 milliGRAM(s) Oral daily  buDESOnide   0.5 milliGRAM(s) Respule 0.5 milliGRAM(s) Inhalation two times a day  dextrose 5%. 1000 milliLiter(s) (50 mL/Hr) IV Continuous <Continuous>  dextrose 50% Injectable 12.5 Gram(s) IV Push once  dextrose 50% Injectable 25 Gram(s) IV Push once  dextrose 50% Injectable 25 Gram(s) IV Push once  famotidine    Tablet 20 milliGRAM(s) Oral daily  heparin  Injectable 5000 Unit(s) SubCutaneous every 8 hours  insulin lispro (HumaLOG) corrective regimen sliding scale   SubCutaneous at bedtime  simvastatin 10 milliGRAM(s) Oral at bedtime  sodium chloride 0.9%. 1000 milliLiter(s) (75 mL/Hr) IV Continuous <Continuous>    MEDICATIONS  (PRN):  acetaminophen   Tablet .. 650 milliGRAM(s) Oral every 6 hours PRN Temp greater or equal to 38C (100.4F), Mild Pain (1 - 3)  ALBUTerol/ipratropium for Nebulization 3 milliLiter(s) Nebulizer every 6 hours PRN Shortness of Breath and/or Wheezing  dextrose 40% Gel 15 Gram(s) Oral once PRN Blood Glucose LESS THAN 70 milliGRAM(s)/deciLiter  glucagon  Injectable 1 milliGRAM(s) IntraMuscular once PRN Glucose <70 milliGRAM(s)/deciLiter        T(C): 37 (11-27-18 @ 07:24), Max: 37 (11-27-18 @ 07:24)  HR: 66 (11-27-18 @ 08:08) (62 - 81)  BP: 129/77 (11-27-18 @ 07:24) (125/74 - 130/78)  RR: 18 (11-27-18 @ 07:24) (13 - 18)  SpO2: 96% (11-27-18 @ 08:08) (96% - 100%)  Wt(kg): --    PHYSICAL EXAM:  GENERAL: NAD, well-groomed, well-developed  HEAD:  Atraumatic, Normocephalic  NECK: Supple, No JVD, Normal thyroid  CHEST/LUNG: Clear to percussion bilaterally; No rales, rhonchi, wheezing, or rubs  HEART: Regular rate and rhythm; No murmurs, rubs, or gallops  ABDOMEN: Soft, Nontender, Nondistended; Bowel sounds present  EXTREMITIES:  2+ Peripheral Pulses, No clubbing, cyanosis, or edema  SKIN: No rashes or lesions    CAPILLARY BLOOD GLUCOSE  382 (26 Nov 2018 19:43)      POCT Blood Glucose.: 177 mg/dL (27 Nov 2018 07:48)  POCT Blood Glucose.: 140 mg/dL (27 Nov 2018 02:48)  POCT Blood Glucose.: 69 mg/dL (26 Nov 2018 23:46)  POCT Blood Glucose.: 382 mg/dL (26 Nov 2018 19:47)                            12.6   8.75  )-----------( 326      ( 27 Nov 2018 01:57 )             39.5       CMP:  11-27 @ 01:57  SGPT 23  Albumin 3.0   Alk Phos 79   Anion Gap 8   SGOT 9   Total Bili 0.1   BUN 15   Calcium Total 8.5   CO2 27   Chloride 109   Creatinine 0.71   eGFR if    eGFR if non AA 89   Glucose 124   Potassium 3.8   Protein 6.1   Sodium 144      Thyroid Function Tests:  11-27 @ 01:57 TSH 1.58 FreeT4 -- T3 -- Anti TPO -- Anti Thyroglobulin Ab -- TSI --      Diabetes Tests:       Radiology:

## 2018-11-27 NOTE — SWALLOW BEDSIDE ASSESSMENT ADULT - COMMENTS
64 y/o female admitted c reports of numbness and tingling on left side of her face and left arm c PMHX of Asthma and Diabetes  Pt A and 0 x 4, speech clear, able to communicate wants/ needs/ follows commands  Pt tolerate regular, soft,. chopped, puree and thin liquids c no overt si/sx of aspiration swallow WNL

## 2018-11-27 NOTE — H&P ADULT - ASSESSMENT
63 y emale PMH: copd/asthma, diabetes, OA ,hx of knee ,back and shoulder sx presents with episode of numbness, tingling,  to left side of face, arm and left leg,  states she checked her finger stick it was over 400,  came to ED.  states she did not have any pain, no slurred speech.  states no recent illness , no fever, no cough, no chest pain, no sob ,admits arthralgia and hand joints deformities and swelling of digits of left hand for a while  ,but didnt seek for medical help or rheumatology eval  .CT brain reveled old infarcts ,no acute pathology .Neurology evaluation is requested Seen by cardiologist for BP control .Admit to telemetry unit for monitoring, send 3 sets of cardiac enzymes to rule out coronary event ,obtain ECHO to evaluate LVEF ,cardiology consult, continue current managmnet,O2 supply, anticoagulation plan as per cardiology consult .Palliative care consult requested ,to discuss advance directives and complete MOLST. Patient admits sometimes  using cane for ambulation .Her sister is at bedside and is very upset ,that "patient doesn't take care of herself and doesn't go to see doctors"

## 2018-11-27 NOTE — CONSULT NOTE ADULT - SUBJECTIVE AND OBJECTIVE BOX
[facility] [place]     Patient is a 63y old  Female who presents with a chief complaint of     HPI:      HPI:    63y Female for Cardiology Consult    PAST MEDICAL & SURGICAL HISTORY:  Asthma  Diabetes mellitus  H/O shoulder surgery  History of back surgery  S/P cholecystectomy  H/O knee surgery      FAMILY HISTORY:  Family history of premature CAD      SOCIAL HISTORY:   Alcohol:  Smoking:    Allergies    Avelox (Seizure)  shellfish (Swelling)    Intolerances        MEDICATIONS  (STANDING):  aspirin 325 milliGRAM(s) Oral daily  dextrose 5%. 1000 milliLiter(s) (50 mL/Hr) IV Continuous <Continuous>  dextrose 50% Injectable 12.5 Gram(s) IV Push once  dextrose 50% Injectable 25 Gram(s) IV Push once  dextrose 50% Injectable 25 Gram(s) IV Push once  famotidine    Tablet 20 milliGRAM(s) Oral daily  heparin  Injectable 5000 Unit(s) SubCutaneous every 8 hours  insulin lispro (HumaLOG) corrective regimen sliding scale   SubCutaneous at bedtime  metFORMIN 1000 milliGRAM(s) Oral two times a day    MEDICATIONS  (PRN):  dextrose 40% Gel 15 Gram(s) Oral once PRN Blood Glucose LESS THAN 70 milliGRAM(s)/deciLiter  glucagon  Injectable 1 milliGRAM(s) IntraMuscular once PRN Glucose <70 milliGRAM(s)/deciLiter      REVIEW OF SYSTEMS:  CONSTITUTIONAL: No fever, weight loss, chills, shakes, or fat  RESPIRATORY: No cough, wheezing, hemoptysis, or shortness of breath  CARDIOVASCULAR: No chest pain, dyspnea, palpitations, dizziness, syncope, paroxysmal nocturnal dyspnea, orthopnea, or arm or leg swelling  GASTROINTESTINAL: No abdominal  or epigastric pain, nausea, vomiting, hematemesis, diarrhea, constipation, melena or bright red bloo  NEUROLOGICAL: No headaches, memory loss, slurred speech, limb weakness, loss of strength, numbness, or tremors  SKIN: No itching, burning, rashes, or lesions  ENDOCRINE: No heat or cold intolerance, or hair loss  MUSCULOSKELETAL: No joint pain or swelling, muscle, back, or extremity pain  HEME/LYMPH: No easy bruising or bleeding gums  ALLERY AND IMMUNOLOGIC: No hives or rash.    Vital Signs Last 24 Hrs  T(C): 36.9 (26 Nov 2018 19:43), Max: 36.9 (26 Nov 2018 19:43)  T(F): 98.4 (26 Nov 2018 19:43), Max: 98.4 (26 Nov 2018 19:43)  HR: 81 (26 Nov 2018 19:43) (81 - 81)  BP: 130/78 (26 Nov 2018 19:43) (130/78 - 130/78)  BP(mean): --  RR: 14 (26 Nov 2018 19:43) (14 - 14)  SpO2: 100% (26 Nov 2018 19:43) (100% - 100%)    PHYSICAL EXAM:  HEAD:  Atraumatic, Normocephalic  EYES: EOMI, PERRLA, conjunctiva and sclera clear  NECK: Supple and normal thyroid.  No JVD or carotid bruit.   HEART: S1, S2 regular , 1/6 soft ejection systolic murmur in mitral area , no thrill and no gallops .  PULMONARY: Bilateral vesicular breathing , few scattered ronchi and few scattered rales are present .  ABDOMEN: Soft nontender and positive bowl sounds   EXTREMITIES:  No clubbing, cyanosis, or pedal  edema  NEUROLOGICAL: AAOX3 , no focal deficit .    LABS:                        13.0   8.84  )-----------( 367      ( 26 Nov 2018 21:04 )             41.1     11-26    138  |  102  |  17  ----------------------------<  338<H>  4.6   |  30  |  0.99    Ca    8.5      26 Nov 2018 21:04    TPro  6.9  /  Alb  3.4  /  TBili  0.2  /  DBili  x   /  AST  9<L>  /  ALT  26  /  AlkPhos  93  11-26    CARDIAC MARKERS ( 26 Nov 2018 21:04 )  <.015 ng/mL / x     / x     / x     / x              BNP      EKG:  ECHO:  IMAGING:    Assessment and Plan :     Will continue to follow during hospital course and give further recommendations as needed . Thanks for your referral . if any questions please contact me at office ([phone])cell [phone]) [facility] [place]     Patient is a 63y old  Female who presents with a chief complaint of     HPI:      HPI:    63y Female for Cardiology Consult    PAST MEDICAL & SURGICAL HISTORY:  Asthma  Diabetes mellitus  H/O shoulder surgery  History of back surgery  S/P cholecystectomy  H/O knee surgery      FAMILY HISTORY:  Family history of premature CAD      SOCIAL HISTORY:   Alcohol:  Smoking:    Allergies    Avelox (Seizure)  shellfish (Swelling)    Intolerances        MEDICATIONS  (STANDING):  aspirin 325 milliGRAM(s) Oral daily  dextrose 5%. 1000 milliLiter(s) (50 mL/Hr) IV Continuous <Continuous>  dextrose 50% Injectable 12.5 Gram(s) IV Push once  dextrose 50% Injectable 25 Gram(s) IV Push once  dextrose 50% Injectable 25 Gram(s) IV Push once  famotidine    Tablet 20 milliGRAM(s) Oral daily  heparin  Injectable 5000 Unit(s) SubCutaneous every 8 hours  insulin lispro (HumaLOG) corrective regimen sliding scale   SubCutaneous at bedtime  metFORMIN 1000 milliGRAM(s) Oral two times a day    MEDICATIONS  (PRN):  dextrose 40% Gel 15 Gram(s) Oral once PRN Blood Glucose LESS THAN 70 milliGRAM(s)/deciLiter  glucagon  Injectable 1 milliGRAM(s) IntraMuscular once PRN Glucose <70 milliGRAM(s)/deciLiter    Vital Signs Last 24 Hrs  T(C): 36.9 (26 Nov 2018 19:43), Max: 36.9 (26 Nov 2018 19:43)  T(F): 98.4 (26 Nov 2018 19:43), Max: 98.4 (26 Nov 2018 19:43)  HR: 81 (26 Nov 2018 19:43) (81 - 81)  BP: 130/78 (26 Nov 2018 19:43) (130/78 - 130/78)  BP(mean): --  RR: 14 (26 Nov 2018 19:43) (14 - 14)  SpO2: 100% (26 Nov 2018 19:43) (100% - 100%)                        13.0   8.84  )-----------( 367      ( 26 Nov 2018 21:04 )             41.1     11-26    138  |  102  |  17  ----------------------------<  338<H>  4.6   |  30  |  0.99    Ca    8.5      26 Nov 2018 21:04    TPro  6.9  /  Alb  3.4  /  TBili  0.2  /  DBili  x   /  AST  9<L>  /  ALT  26  /  AlkPhos  93  11-26    CARDIAC MARKERS ( 26 Nov 2018 21:04 )  <.015 ng/mL / x     / x     / x     / x              BNP      EKG:  ECHO:  IMAGING:    Assessment and Plan :   FULL CONSULT DICTATED .  63 years old female with H/O DM has numbness of left half of body although better but still present and has Possible CVA or TIA . Cardiac stable so far .  Will continue to follow during hospital course and give further recommendations as needed . Thanks for your referral . if any questions please contact me at office ([phone] [phone])

## 2018-11-27 NOTE — PHYSICAL THERAPY INITIAL EVALUATION ADULT - PERTINENT HX OF CURRENT PROBLEM, REHAB EVAL
63 y WFemale PMH: copd/asthma, diabetes, OA ,hx of knee ,back and shoulder sx presents with episode of numbness, tingling,  to left side of face, arm and left leg,  states she checked her finger stick it was over 400,  came to ED.  states she did not have any pain, no slurred speech.  states no recent illness , no fever, no cough, no chest pain, no sob ,admits arthralgia and hand joints deformities and swelling of digits of left hand for a while

## 2018-11-27 NOTE — H&P ADULT - MUSCULOSKELETAL
details… detailed exam no calf tenderness/decreased ROM due to pain/L HAND AND LUE WEAKNESS/decreased ROM/no joint erythema/no joint warmth/joint swelling

## 2018-11-27 NOTE — H&P ADULT - NEGATIVE OPHTHALMOLOGIC SYMPTOMS
no lacrimation R/no blurred vision R/no photophobia/no lacrimation L/no blurred vision L/no diplopia

## 2018-11-27 NOTE — CONSULT NOTE ADULT - PROBLEM SELECTOR RECOMMENDATION 9
oral anti-diabetes agents on hold  cont humalog scale coverage alone  cont cons cho diet  goal bg 100-180 in hosp setting

## 2018-11-27 NOTE — PROVIDER CONTACT NOTE (OTHER) - ASSESSMENT
pt is alert and oriented x 4, speech clear and appropriate.  Moving all extremities.  Left hand grasp slightly weaker than right, but moving arm equally and well.    Continues to have numbness as described above.  PERRLA reacting briskly to light.

## 2018-11-27 NOTE — PHYSICAL THERAPY INITIAL EVALUATION ADULT - ADDITIONAL COMMENTS
Pt lives with  in private home 3 LEONEL no hand rail.  Pt has full flight to second floor with one hand rail.  Pt was independent in all ADLs and ambulation without an assistive device.  Pt has tub shower with curtain and shower seat.

## 2018-11-28 ENCOUNTER — TRANSCRIPTION ENCOUNTER (OUTPATIENT)
Age: 63
End: 2018-11-28

## 2018-11-28 DIAGNOSIS — I63.9 CEREBRAL INFARCTION, UNSPECIFIED: ICD-10-CM

## 2018-11-28 LAB
ANION GAP SERPL CALC-SCNC: 7 MMOL/L — SIGNIFICANT CHANGE UP (ref 5–17)
BUN SERPL-MCNC: 12 MG/DL — SIGNIFICANT CHANGE UP (ref 7–23)
CALCIUM SERPL-MCNC: 8.3 MG/DL — LOW (ref 8.5–10.1)
CHLORIDE SERPL-SCNC: 108 MMOL/L — SIGNIFICANT CHANGE UP (ref 96–108)
CHOLEST SERPL-MCNC: 214 MG/DL — HIGH (ref 10–199)
CO2 SERPL-SCNC: 26 MMOL/L — SIGNIFICANT CHANGE UP (ref 22–31)
CREAT SERPL-MCNC: 0.67 MG/DL — SIGNIFICANT CHANGE UP (ref 0.5–1.3)
ERYTHROCYTE [SEDIMENTATION RATE] IN BLOOD: 7 MM/HR — SIGNIFICANT CHANGE UP (ref 0–20)
FOLATE SERPL-MCNC: 12 NG/ML — SIGNIFICANT CHANGE UP
GLUCOSE SERPL-MCNC: 198 MG/DL — HIGH (ref 70–99)
HBA1C BLD-MCNC: 10.4 % — HIGH (ref 4–5.6)
HCT VFR BLD CALC: 40.7 % — SIGNIFICANT CHANGE UP (ref 34.5–45)
HDLC SERPL-MCNC: 47 MG/DL — LOW
HGB BLD-MCNC: 13 G/DL — SIGNIFICANT CHANGE UP (ref 11.5–15.5)
LIPID PNL WITH DIRECT LDL SERPL: 129 MG/DL — SIGNIFICANT CHANGE UP
MCHC RBC-ENTMCNC: 27.5 PG — SIGNIFICANT CHANGE UP (ref 27–34)
MCHC RBC-ENTMCNC: 31.9 GM/DL — LOW (ref 32–36)
MCV RBC AUTO: 86 FL — SIGNIFICANT CHANGE UP (ref 80–100)
NRBC # BLD: 0 /100 WBCS — SIGNIFICANT CHANGE UP (ref 0–0)
PLATELET # BLD AUTO: 340 K/UL — SIGNIFICANT CHANGE UP (ref 150–400)
POTASSIUM SERPL-MCNC: 4.1 MMOL/L — SIGNIFICANT CHANGE UP (ref 3.5–5.3)
POTASSIUM SERPL-SCNC: 4.1 MMOL/L — SIGNIFICANT CHANGE UP (ref 3.5–5.3)
RBC # BLD: 4.73 M/UL — SIGNIFICANT CHANGE UP (ref 3.8–5.2)
RBC # FLD: 12.2 % — SIGNIFICANT CHANGE UP (ref 10.3–14.5)
RHEUMATOID FACT SERPL-ACNC: <10 IU/ML — SIGNIFICANT CHANGE UP (ref 0–13)
SODIUM SERPL-SCNC: 141 MMOL/L — SIGNIFICANT CHANGE UP (ref 135–145)
TOTAL CHOLESTEROL/HDL RATIO MEASUREMENT: 4.6 RATIO — SIGNIFICANT CHANGE UP (ref 3.3–7.1)
TRIGL SERPL-MCNC: 191 MG/DL — HIGH (ref 10–149)
TSH SERPL-MCNC: 1.53 UIU/ML — SIGNIFICANT CHANGE UP (ref 0.36–3.74)
URATE SERPL-MCNC: 3.2 MG/DL — SIGNIFICANT CHANGE UP (ref 2.5–7)
VIT B12 SERPL-MCNC: 337 PG/ML — SIGNIFICANT CHANGE UP (ref 232–1245)
WBC # BLD: 7.3 K/UL — SIGNIFICANT CHANGE UP (ref 3.8–10.5)
WBC # FLD AUTO: 7.3 K/UL — SIGNIFICANT CHANGE UP (ref 3.8–10.5)

## 2018-11-28 RX ORDER — ACETAMINOPHEN 500 MG
2 TABLET ORAL
Qty: 0 | Refills: 0 | DISCHARGE
Start: 2018-11-28

## 2018-11-28 RX ORDER — ASPIRIN/CALCIUM CARB/MAGNESIUM 324 MG
1 TABLET ORAL
Qty: 30 | Refills: 0
Start: 2018-11-28 | End: 2018-12-27

## 2018-11-28 RX ORDER — SIMVASTATIN 20 MG/1
1 TABLET, FILM COATED ORAL
Qty: 30 | Refills: 0 | OUTPATIENT
Start: 2018-11-28 | End: 2018-12-27

## 2018-11-28 RX ORDER — INSULIN GLARGINE 100 [IU]/ML
15 INJECTION, SOLUTION SUBCUTANEOUS
Qty: 0 | Refills: 0 | DISCHARGE
Start: 2018-11-28

## 2018-11-28 RX ORDER — INSULIN LISPRO 100/ML
VIAL (ML) SUBCUTANEOUS
Qty: 0 | Refills: 0 | Status: DISCONTINUED | OUTPATIENT
Start: 2018-11-28 | End: 2018-11-30

## 2018-11-28 RX ORDER — INSULIN LISPRO 100/ML
VIAL (ML) SUBCUTANEOUS AT BEDTIME
Qty: 0 | Refills: 0 | Status: DISCONTINUED | OUTPATIENT
Start: 2018-11-28 | End: 2018-11-30

## 2018-11-28 RX ORDER — ASPIRIN/CALCIUM CARB/MAGNESIUM 324 MG
1 TABLET ORAL
Qty: 0 | Refills: 0 | COMMUNITY
Start: 2018-11-28

## 2018-11-28 RX ORDER — FAMOTIDINE 10 MG/ML
1 INJECTION INTRAVENOUS
Qty: 0 | Refills: 0 | DISCHARGE
Start: 2018-11-28

## 2018-11-28 RX ORDER — INSULIN GLARGINE 100 [IU]/ML
10 INJECTION, SOLUTION SUBCUTANEOUS
Qty: 0 | Refills: 0 | COMMUNITY
Start: 2018-11-28

## 2018-11-28 RX ORDER — INSULIN GLARGINE 100 [IU]/ML
10 INJECTION, SOLUTION SUBCUTANEOUS EVERY MORNING
Qty: 0 | Refills: 0 | Status: DISCONTINUED | OUTPATIENT
Start: 2018-11-28 | End: 2018-11-30

## 2018-11-28 RX ORDER — INSULIN LISPRO 100/ML
0 VIAL (ML) SUBCUTANEOUS
Qty: 0 | Refills: 0 | DISCHARGE
Start: 2018-11-28

## 2018-11-28 RX ADMIN — HEPARIN SODIUM 5000 UNIT(S): 5000 INJECTION INTRAVENOUS; SUBCUTANEOUS at 21:29

## 2018-11-28 RX ADMIN — HEPARIN SODIUM 5000 UNIT(S): 5000 INJECTION INTRAVENOUS; SUBCUTANEOUS at 15:07

## 2018-11-28 RX ADMIN — Medication 650 MILLIGRAM(S): at 21:29

## 2018-11-28 RX ADMIN — HEPARIN SODIUM 5000 UNIT(S): 5000 INJECTION INTRAVENOUS; SUBCUTANEOUS at 05:55

## 2018-11-28 RX ADMIN — Medication 650 MILLIGRAM(S): at 15:07

## 2018-11-28 RX ADMIN — Medication 6: at 17:34

## 2018-11-28 RX ADMIN — SIMVASTATIN 10 MILLIGRAM(S): 20 TABLET, FILM COATED ORAL at 21:29

## 2018-11-28 RX ADMIN — FAMOTIDINE 20 MILLIGRAM(S): 10 INJECTION INTRAVENOUS at 11:42

## 2018-11-28 RX ADMIN — Medication 0.5 MILLIGRAM(S): at 20:34

## 2018-11-28 RX ADMIN — Medication 0.5 MILLIGRAM(S): at 07:26

## 2018-11-28 RX ADMIN — SODIUM CHLORIDE 75 MILLILITER(S): 9 INJECTION INTRAMUSCULAR; INTRAVENOUS; SUBCUTANEOUS at 08:19

## 2018-11-28 RX ADMIN — Medication 2: at 11:42

## 2018-11-28 RX ADMIN — Medication 650 MILLIGRAM(S): at 05:55

## 2018-11-28 RX ADMIN — Medication 2: at 08:18

## 2018-11-28 RX ADMIN — Medication 325 MILLIGRAM(S): at 11:42

## 2018-11-28 NOTE — PROGRESS NOTE ADULT - SUBJECTIVE AND OBJECTIVE BOX
Neurology follow up note    GUNNER [id number]      Interval History:    Patient seen with sister still with numbness     MEDICATIONS    acetaminophen   Tablet .. 650 milliGRAM(s) Oral every 8 hours  ALBUTerol/ipratropium for Nebulization 3 milliLiter(s) Nebulizer every 6 hours PRN  aspirin 325 milliGRAM(s) Oral daily  buDESOnide   0.5 milliGRAM(s) Respule 0.5 milliGRAM(s) Inhalation two times a day  dextrose 40% Gel 15 Gram(s) Oral once PRN  dextrose 5%. 1000 milliLiter(s) IV Continuous <Continuous>  dextrose 50% Injectable 12.5 Gram(s) IV Push once  dextrose 50% Injectable 25 Gram(s) IV Push once  dextrose 50% Injectable 25 Gram(s) IV Push once  famotidine    Tablet 20 milliGRAM(s) Oral daily  glucagon  Injectable 1 milliGRAM(s) IntraMuscular once PRN  heparin  Injectable 5000 Unit(s) SubCutaneous every 8 hours  insulin lispro (HumaLOG) corrective regimen sliding scale   SubCutaneous three times a day before meals  simvastatin 10 milliGRAM(s) Oral at bedtime  sodium chloride 0.9%. 1000 milliLiter(s) IV Continuous <Continuous>      Allergies    Avelox (Seizure)  shellfish (Swelling)    Intolerances            Vital Signs Last 24 Hrs  T(C): 36.8 (28 Nov 2018 08:16), Max: 36.9 (27 Nov 2018 21:07)  T(F): 98.3 (28 Nov 2018 08:16), Max: 98.4 (27 Nov 2018 21:07)  HR: 64 (28 Nov 2018 08:16) (62 - 76)  BP: 127/82 (28 Nov 2018 08:16) (121/76 - 143/83)  BP(mean): --  RR: 15 (28 Nov 2018 08:16) (15 - 18)  SpO2: 94% (28 Nov 2018 08:16) (93% - 96%)      REVIEW OF SYSTEMS:     Constitutional: No fever, chills, fatigue, weakness  Eyes: no eye pain, visual disturbances, or discharge  ENT:  No difficulty hearing, tinnitus, vertigo; No sinus or throat pain  Neck: No pain or stiffness  Respiratory: No cough, dyspnea, wheezing   Cardiovascular: No chest pain, palpitations,   Gastrointestinal: No abdominal or epigastric pain. No nausea, vomiting  No diarrhea or constipation.   Genitourinary: No dysuria, frequency, hematuria or incontinence  Neurological: No headaches, lightheadedness, vertigo, left sided numbness  Psychiatric: No depression, anxiety, mood swings or difficulty sleeping  Musculoskeletal: No joint pain or swelling; No muscle, back or extremity pain  Skin: No itching, burning, rashes or lesions   Lymph Nodes: No enlarged glands  Endocrine: No heat or cold intolerance; No hair loss   Allergy and Immunologic: No hives or eczema    On Neurological Examination:    The patient is awake and alert.    Extraocular movements were intact.    Pupils were equal, round, and reactive bilaterally 3 mm to 2 mm.    Speech was fluent.  Smile was symmetric.    Motor:  Bilateral upper and lower were 4+/5, left hand was 4-/5, does have decreased range of motion of left hand with arthritic changes.    Sensory:  The patient has decreased light touch to left face, left arm, and left leg,   There was no drift.  Finger-to-nose within normal limits.        Follow simple commands  Follow complex commands      GENERAL Exam: Nontoxic , No Acute Distress   	  HEENT:  normocephalic, atraumatic  		  LUNGS: Clear bilaterally    	  HEART: Normal S1S2   No murmur RRR        	  GI/ ABDOMEN:  Soft  Non tender    EXTREMITIES:   No Edema  No Clubbing  No Cyanosis     SKIN: Normal  No Ecchymosis               LABS:  CBC Full  -  ( 28 Nov 2018 07:19 )  WBC Count : 7.30 K/uL  Hemoglobin : 13.0 g/dL  Hematocrit : 40.7 %  Platelet Count - Automated : 340 K/uL  Mean Cell Volume : 86.0 fl  Mean Cell Hemoglobin : 27.5 pg  Mean Cell Hemoglobin Concentration : 31.9 gm/dL  Auto Neutrophil # : x  Auto Lymphocyte # : x  Auto Monocyte # : x  Auto Eosinophil # : x  Auto Basophil # : x  Auto Neutrophil % : x  Auto Lymphocyte % : x  Auto Monocyte % : x  Auto Eosinophil % : x  Auto Basophil % : x      11-28    141  |  108  |  12  ----------------------------<  198<H>  4.1   |  26  |  0.67    Ca    8.3<L>      28 Nov 2018 07:19  Mg     2.0     11-27    TPro  6.1  /  Alb  3.0<L>  /  TBili  0.1<L>  /  DBili  x   /  AST  9<L>  /  ALT  23  /  AlkPhos  79  11-27    Hemoglobin A1C:     LIVER FUNCTIONS - ( 27 Nov 2018 01:57 )  Alb: 3.0 g/dL / Pro: 6.1 g/dL / ALK PHOS: 79 U/L / ALT: 23 U/L / AST: 9 U/L / GGT: x           Vitamin B12 Vitamin B12, Serum: 337 pg/mL (11-27 @ 19:46)    PT/INR - ( 27 Nov 2018 13:55 )   PT: 10.4 sec;   INR: 0.92 ratio               RADIOLOGY    ANALYSIS AND PLAN:  This is a 63-year-old with left-sided paresthesias, decreased left hand range of motion, history of diabetes, and spinal disc disease.  1.	For left-sided paresthesias, clinical impression is cerebrovascular accident  2.	I would recommend telemetry evaluation to rule out underlying arrhythmia.  3.	The patient's NIH stroke scale for sensory deficit overall would be roughly one.  4.	 MRI and MRA of the brain noted  5.	Echocardiogram pending   6.	Carotid doppler no sonographic evidence for hemodynamically significant carotid stenosis.  7.	For history of diabetes, would recommend strict control of blood sugars.  8.	Will start the patient on aspirin 325.  9.	Will start the patient on statin.  10.	For history of spinal disc disease, supportive therapy.  11.	Spoke with the patient's sister at the bedside 11/28/18  They understand the reasoning and thought process.    Neurologic standpoint only cleared for discharge planning if echo normal     Thank you for the courtesy of this consultation.    Physical therapy evaluation as tolerated  OOB to chair/ambulation with assistance only if possible.    Greater than 45 minutes spent in direct patient care reviewing  the notes, lab data/ imaging , discussion with multidisciplinary team.

## 2018-11-28 NOTE — DISCHARGE NOTE ADULT - PROVIDER TOKENS
TOKEN:'[id number]' TOKEN:'[id number]:'[id number]:'[id number]' TOKEN:'[id number]'

## 2018-11-28 NOTE — DISCHARGE NOTE ADULT - ADDITIONAL INSTRUCTIONS
Follow up with Dr. BERNICE Sol on 12/14/18 at 9:30 am Follow up with Dr. BERNICE Sol on 12/14/18 at 9:30 am PLEASE STOP BY TODAY IN DR. PERLMAN OFFICE TO GET SAMPLES OF INSULINS /DIABETIC SUPPLIES AND FOR EDUCATION ( OPEN UNTIL 3 PM ),ADDRESS IS BELOW

## 2018-11-28 NOTE — DIETITIAN INITIAL EVALUATION ADULT. - OTHER INFO
patient reports does not use added sugar in beverages but does eat many concentrated sweets in large portions including cake and ice cream. has been taking medication for DM but not following diet. patient instructed in consistent cho diet given handouts and portion control reviewed.

## 2018-11-28 NOTE — DISCHARGE NOTE ADULT - MEDICATION SUMMARY - MEDICATIONS TO TAKE
I will START or STAY ON the medications listed below when I get home from the hospital:    acetaminophen 325 mg oral tablet  -- 2 tab(s) by mouth every 8 hours, As Needed  -- Indication: For Osteoarthritis    aspirin 325 mg oral tablet  -- 1 tab(s) by mouth once a day  -- Indication: For CVA (cerebral vascular accident)    metFORMIN 1000 mg oral tablet  -- 1 tab(s) by mouth 2 times a day  -- Indication: For Hyperglycemia    insulin glargine  -- 10 unit(s) subcutaneous once a day  -- Indication: For DM    insulin lispro (concentrated) 200 units/mL subcutaneous solution  --  subcutaneous SLIDING SCALE CORRECTIVE REGIMEN  -- Indication: For DM    simvastatin 10 mg oral tablet  -- 1 tab(s) by mouth once a day (at bedtime)  -- Indication: For HLD    Ventolin HFA 90 mcg/inh inhalation aerosol  -- 2 puff(s) inhaled every 6 hours, As Needed - for shortness of breath and/or wheezing  -- For inhalation only.  It is very important that you take or use this exactly as directed.  Do not skip doses or discontinue unless directed by your doctor.  Obtain medical advice before taking any non-prescription drugs as some may affect the action of this medication.  Shake well before use.    -- Indication: For ASTHMA    famotidine 20 mg oral tablet  -- 1 tab(s) by mouth once a day  -- Indication: For Prophylactic measure I will START or STAY ON the medications listed below when I get home from the hospital:    acetaminophen 325 mg oral tablet  -- 2 tab(s) by mouth every 8 hours, As Needed  -- Indication: For Osteoarthritis    aspirin 325 mg oral tablet  -- 1 tab(s) by mouth once a day  -- Indication: For CVA (cerebral vascular accident)    metFORMIN 1000 mg oral tablet  -- 1 tab(s) by mouth 2 times a day  -- Indication: For Hyperglycemia    insulin glargine  -- 10 unit(s) subcutaneous once a day  -- Indication: For DM    insulin lispro (concentrated) 200 units/mL subcutaneous solution  --  subcutaneous SLIDING SCALE CORRECTIVE REGIMEN  -- Indication: For DM    atorvastatin 20 mg oral tablet  -- 1 tab(s) by mouth once a day  -- Indication: For Hld    Ventolin HFA 90 mcg/inh inhalation aerosol  -- 2 puff(s) inhaled every 6 hours, As Needed - for shortness of breath and/or wheezing  -- For inhalation only.  It is very important that you take or use this exactly as directed.  Do not skip doses or discontinue unless directed by your doctor.  Obtain medical advice before taking any non-prescription drugs as some may affect the action of this medication.  Shake well before use.    -- Indication: For ASTHMA    famotidine 20 mg oral tablet  -- 1 tab(s) by mouth once a day  -- Indication: For Prophylactic measure I will START or STAY ON the medications listed below when I get home from the hospital:    acetaminophen 325 mg oral tablet  -- 2 tab(s) by mouth every 8 hours, As Needed  -- Indication: For Osteoarthritis    aspirin 325 mg oral tablet  -- 1 tab(s) by mouth once a day  -- Indication: For CVA (cerebral vascular accident)    metFORMIN 1000 mg oral tablet  -- 1 tab(s) by mouth 2 times a day  -- Indication: For Diabetes mellitus    insulin glargine  -- 10 unit(s) subcutaneous once a day  -- Indication: For DM    insulin lispro (concentrated) 200 units/mL subcutaneous solution  --  subcutaneous SLIDING SCALE CORRECTIVE REGIMEN  -- Indication: For DM    atorvastatin 20 mg oral tablet  -- 1 tab(s) by mouth once a day  -- Indication: For Hld    Ventolin HFA 90 mcg/inh inhalation aerosol  -- 2 puff(s) inhaled every 6 hours, As Needed - for shortness of breath and/or wheezing  -- For inhalation only.  It is very important that you take or use this exactly as directed.  Do not skip doses or discontinue unless directed by your doctor.  Obtain medical advice before taking any non-prescription drugs as some may affect the action of this medication.  Shake well before use.    -- Indication: For ASTHMA    famotidine 20 mg oral tablet  -- 1 tab(s) by mouth once a day  -- Indication: For Prophylactic measure I will START or STAY ON the medications listed below when I get home from the hospital:    acetaminophen 325 mg oral tablet  -- 2 tab(s) by mouth every 8 hours, As Needed  -- Indication: For Osteoarthritis    aspirin 325 mg oral tablet  -- 1 tab(s) by mouth once a day  -- Indication: For CVA (cerebral vascular accident)    insulin lispro (concentrated) 200 units/mL subcutaneous solution  --  subcutaneous SLIDING SCALE CORRECTIVE REGIMEN  -- Indication: For DM    metFORMIN 1000 mg oral tablet  -- 1 tab(s) by mouth 2 times a day  -- Indication: For Diabetes mellitus    insulin glargine  -- 15 unit(s) subcutaneous once a day  -- Indication: For Diabetes mellitus    atorvastatin 20 mg oral tablet  -- 1 tab(s) by mouth once a day  -- Indication: For Hld    Ventolin HFA 90 mcg/inh inhalation aerosol  -- 2 puff(s) inhaled every 6 hours, As Needed - for shortness of breath and/or wheezing  -- For inhalation only.  It is very important that you take or use this exactly as directed.  Do not skip doses or discontinue unless directed by your doctor.  Obtain medical advice before taking any non-prescription drugs as some may affect the action of this medication.  Shake well before use.    -- Indication: For ASTHMA    famotidine 20 mg oral tablet  -- 1 tab(s) by mouth once a day  -- Indication: For Prophylactic measure

## 2018-11-28 NOTE — DISCHARGE NOTE ADULT - MEDICATION SUMMARY - MEDICATIONS TO STOP TAKING
I will STOP taking the medications listed below when I get home from the hospital:    glyBURIDE 5 mg oral tablet  -- 1 tab(s) by mouth once a day  -- Avoid prolonged or excessive exposure to direct and/or artificial sunlight while taking this medication.  Do not drink alcoholic beverages when taking this medication.  It is very important that you take or use this exactly as directed.  Do not skip doses or discontinue unless directed by your doctor.

## 2018-11-28 NOTE — DISCHARGE NOTE ADULT - PATIENT PORTAL LINK FT
You can access the [facility] Patient Portal, offered by [facility], by registering with the following website: [email or web address]

## 2018-11-28 NOTE — DISCHARGE NOTE ADULT - HOSPITAL COURSE
63 y WFemale PMH: copd/asthma, diabetes, OA ,hx of knee ,back and shoulder sx presents with episode of numbness, tingling,  to left side of face, arm and left leg,  states she checked her finger stick it was over 400,  came to ED.  states she did not have any pain, no slurred speech.  states no recent illness , no fever, no cough, no chest pain, no sob ,admits arthralgia and hand joints deformities and swelling of digits of left hand for a while  ,but didnt seek for medical help or rheumatology eval  .CT brain reveled old infarcts ,no acute pathology .Neurology evaluation is requested Seen by cardiologist for BP control .Admit to telemetry unit for monitoring, send 3 sets of cardiac enzymes to rule out coronary event ,obtain ECHO to evaluate LVEF ,cardiology consult, continue current managmnet,O2 supply, anticoagulation plan as per cardiology consult .Palliative care consult requested ,to discuss advance directives and complete MOLST. Patient admits sometimes  using cane for ambulation .Her sister is at bedside and is very upset ,that "patient doesn't take care of herself and doesn't go to see doctors"  Diagnosed with R THALAMIC ACUTE CVA AND STARTED ON  MG AND STATIN DAILY .SEEN BY PT AND ASAD VS HOME PT RECOMMENDED .STARTED ON INSULIN AND LANTUS BY ENDOCRINOLOGIST FOR BETTER BG CONTROL

## 2018-11-28 NOTE — DIETITIAN INITIAL EVALUATION ADULT. - NS AS NUTRI INTERV ED CONTENT
patient educated on Consistent cho diet left with RD name and phone # PT arrived in room patient left with information to review./Nutrition relationship to health/disease/Other (specify)

## 2018-11-28 NOTE — PROGRESS NOTE ADULT - SUBJECTIVE AND OBJECTIVE BOX
PROGRESS NOTE  Patient is a 63y old  Female who presents with a chief complaint of Hyperglycemia and LUE weakness (28 Nov 2018 11:40)  Chart and available morning labs /imaging are reviewed electronically , urgent issues addressed . More information  is being added upon completion of rounds , when more information is collected and management discussed with consultants , medical staff and social service/case management on the floor   OVERNIGHT  No new issues reported by medical staff . All above noted Patient is resting in a bed comfortably  .No distress noted   HPI:  63 y WFemale PMH: copd/asthma, diabetes, OA ,hx of knee ,back and shoulder sx presents with episode of numbness, tingling,  to left side of face, arm and left leg,  states she checked her finger stick it was over 400,  came to ED.  states she did not have any pain, no slurred speech.  states no recent illness , no fever, no cough, no chest pain, no sob ,admits arthralgia and hand joints deformities and swelling of digits of left hand for a while  ,but didnt seek for medical help or rheumatology eval  .CT brain reveled old infarcts ,no acute pathology .Neurology evaluation is requested Seen by cardiologist for BP control .Admit to telemetry unit for monitoring, send 3 sets of cardiac enzymes to rule out coronary event ,obtain ECHO to evaluate LVEF ,cardiology consult, continue current managmnet,O2 supply, anticoagulation plan as per cardiology consult .Palliative care consult requested ,to discuss advance directives and complete MOLST. Patient admits sometimes  using cane for ambulation .Her sister is at bedside and is very upset ,that "patient doesn't take care of herself and doesn't go to see doctors" (27 Nov 2018 07:06)    PAST MEDICAL & SURGICAL HISTORY:  Asthma  Diabetes mellitus  H/O shoulder surgery  History of back surgery  S/P cholecystectomy  H/O knee surgery      MEDICATIONS  (STANDING):  acetaminophen   Tablet .. 650 milliGRAM(s) Oral every 8 hours  aspirin 325 milliGRAM(s) Oral daily  buDESOnide   0.5 milliGRAM(s) Respule 0.5 milliGRAM(s) Inhalation two times a day  dextrose 5%. 1000 milliLiter(s) (50 mL/Hr) IV Continuous <Continuous>  dextrose 50% Injectable 12.5 Gram(s) IV Push once  dextrose 50% Injectable 25 Gram(s) IV Push once  dextrose 50% Injectable 25 Gram(s) IV Push once  famotidine    Tablet 20 milliGRAM(s) Oral daily  heparin  Injectable 5000 Unit(s) SubCutaneous every 8 hours  insulin glargine Injectable (LANTUS) 10 Unit(s) SubCutaneous every morning  insulin lispro (HumaLOG) corrective regimen sliding scale   SubCutaneous three times a day before meals  insulin lispro (HumaLOG) corrective regimen sliding scale   SubCutaneous at bedtime  simvastatin 10 milliGRAM(s) Oral at bedtime    MEDICATIONS  (PRN):  ALBUTerol/ipratropium for Nebulization 3 milliLiter(s) Nebulizer every 6 hours PRN Shortness of Breath and/or Wheezing  dextrose 40% Gel 15 Gram(s) Oral once PRN Blood Glucose LESS THAN 70 milliGRAM(s)/deciLiter  glucagon  Injectable 1 milliGRAM(s) IntraMuscular once PRN Glucose <70 milliGRAM(s)/deciLiter      OBJECTIVE    T(C): 36.7 (11-28-18 @ 11:13), Max: 36.9 (11-27-18 @ 21:07)  HR: 61 (11-28-18 @ 11:13) (61 - 76)  BP: 126/80 (11-28-18 @ 11:13) (121/76 - 143/83)  RR: 16 (11-28-18 @ 11:13) (15 - 18)  SpO2: 98% (11-28-18 @ 11:13) (93% - 98%)  Wt(kg): --  I&O's Summary    27 Nov 2018 07:01  -  28 Nov 2018 07:00  --------------------------------------------------------  IN: 1500 mL / OUT: 1600 mL / NET: -100 mL            LABS:                        13.0   7.30  )-----------( 340      ( 28 Nov 2018 07:19 )             40.7     11-28    141  |  108  |  12  ----------------------------<  198<H>  4.1   |  26  |  0.67    Ca    8.3<L>      28 Nov 2018 07:19  Mg     2.0     11-27    TPro  6.1  /  Alb  3.0<L>  /  TBili  0.1<L>  /  DBili  x   /  AST  9<L>  /  ALT  23  /  AlkPhos  79  11-27    CAPILLARY BLOOD GLUCOSE      POCT Blood Glucose.: 243 mg/dL (28 Nov 2018 11:39)  POCT Blood Glucose.: 207 mg/dL (28 Nov 2018 08:11)  POCT Blood Glucose.: 264 mg/dL (27 Nov 2018 21:43)  POCT Blood Glucose.: 235 mg/dL (27 Nov 2018 21:41)  POCT Blood Glucose.: 195 mg/dL (27 Nov 2018 16:49)    PT/INR - ( 27 Nov 2018 13:55 )   PT: 10.4 sec;   INR: 0.92 ratio               RADIOLOGY & ADDITIONAL TESTS:< from: MR Angio Head No Cont (11.27.18 @ 14:30) >  EXAM:  MR ANGIO BRAIN                          EXAM:  MR BRAIN                            PROCEDURE DATE:  11/27/2018          INTERPRETATION:  .    CLINICAL INFORMATION: Left-sided numbness.    TECHNIQUE: Multiplanar multi sequential MRI examination of the brain was   performed without the administration of IV gadolinium. MRA images through   the [place] were obtained using a combination of 2-D and 3-D   time-of-flight acquisition. The data was then reformatted into a   volumetric data set and reviewed as rotational MIP images.     COMPARISON: Comparison is made to the most recent prior CT examination of   the head from 11/26/2018. Comparison is also made to a prior brain MRI   examination and MRA examination of the head from 12/21/2011.    FINDINGS:    MRI Brain: There is an acute lacunar infarct within the right thalamus   with associated T2/FLAIR hyperintense signal compatible with cytotoxic   edema.    There is a very small right frontal chronic transcortical infarct (series   8, images 18-19).    Multiple additional patchy confluent nonspecific T2/FLAIR hyperintense   signal changes are noted throughout the bihemispheric white matter   without associated mass effect or restricted diffusion.    Ventricular size and configuration is unremarkable. No abnormal   extra-axial fluid collections are noted. Flow-voids are noted throughout   the major intracranial vessels, on the T2 weighted images, consistent   with their patency. The sella turcica and posterior fossa are   unremarkable.    The paranasal sinuses and mastoid air cells are clear. Calvarial signal   is normal. The orbits appear within normal limits.    MRA [place] [facility]: The bilateral intracranial internal carotid,   anterior, and middle cerebral arteries appear unremarkable.    The anterior and bilateral posterior communicating arteries are notable.    The bilateral intradural vertebral arteries, vertebrobasilar junction,   basilar artery, and basilar tip appear unremarkable as well as the right   posterior cerebral artery. There is an unchanged 2 mm infundibulum off a   left medial posterior choroidal arterial branch (series 6, image 78).   Otherwise, the left posterior cerebral artery appears unremarkable.    IMPRESSION:    MRI brain: Acute lacunar infarct involving the right thalamus with   associated cytotoxic edema.    No acute intracranial hemorrhage.    Multiple additional nonspecific abnormal white matter foci of T2/FLAIR   prolongation statistically favoring microvascular disease.    MRA head: No large vessel occlusion or major stenosis.    < end of copied text >     reviewed elctronically  ASSESSMENT/PLAN: PROGRESS NOTE  Patient is a 63y old  Female who presents with a chief complaint of Hyperglycemia and LUE weakness (28 Nov 2018 11:40)  Chart and available morning labs /imaging are reviewed electronically , urgent issues addressed . More information  is being added upon completion of rounds , when more information is collected and management discussed with consultants , medical staff and social service/case management on the floor   OVERNIGHT  No new issues reported by medical staff . All above noted Patient is resting in a bed comfortably  .No distress noted LUE weakness still present Patient and her sister are aware of MRI findings   HPI:  63 y WFemale PMH: copd/asthma, diabetes, OA ,hx of knee ,back and shoulder sx presents with episode of numbness, tingling,  to left side of face, arm and left leg,  states she checked her finger stick it was over 400,  came to ED.  states she did not have any pain, no slurred speech.  states no recent illness , no fever, no cough, no chest pain, no sob ,admits arthralgia and hand joints deformities and swelling of digits of left hand for a while  ,but didnt seek for medical help or rheumatology eval  .CT brain reveled old infarcts ,no acute pathology .Neurology evaluation is requested Seen by cardiologist for BP control .Admit to telemetry unit for monitoring, send 3 sets of cardiac enzymes to rule out coronary event ,obtain ECHO to evaluate LVEF ,cardiology consult, continue current managmnet,O2 supply, anticoagulation plan as per cardiology consult .Palliative care consult requested ,to discuss advance directives and complete MOLST. Patient admits sometimes  using cane for ambulation .Her sister is at bedside and is very upset ,that "patient doesn't take care of herself and doesn't go to see doctors" (27 Nov 2018 07:06)    PAST MEDICAL & SURGICAL HISTORY:  Asthma  Diabetes mellitus  H/O shoulder surgery  History of back surgery  S/P cholecystectomy  H/O knee surgery      MEDICATIONS  (STANDING):  acetaminophen   Tablet .. 650 milliGRAM(s) Oral every 8 hours  aspirin 325 milliGRAM(s) Oral daily  buDESOnide   0.5 milliGRAM(s) Respule 0.5 milliGRAM(s) Inhalation two times a day  dextrose 5%. 1000 milliLiter(s) (50 mL/Hr) IV Continuous <Continuous>  dextrose 50% Injectable 12.5 Gram(s) IV Push once  dextrose 50% Injectable 25 Gram(s) IV Push once  dextrose 50% Injectable 25 Gram(s) IV Push once  famotidine    Tablet 20 milliGRAM(s) Oral daily  heparin  Injectable 5000 Unit(s) SubCutaneous every 8 hours  insulin glargine Injectable (LANTUS) 10 Unit(s) SubCutaneous every morning  insulin lispro (HumaLOG) corrective regimen sliding scale   SubCutaneous three times a day before meals  insulin lispro (HumaLOG) corrective regimen sliding scale   SubCutaneous at bedtime  simvastatin 10 milliGRAM(s) Oral at bedtime    MEDICATIONS  (PRN):  ALBUTerol/ipratropium for Nebulization 3 milliLiter(s) Nebulizer every 6 hours PRN Shortness of Breath and/or Wheezing  dextrose 40% Gel 15 Gram(s) Oral once PRN Blood Glucose LESS THAN 70 milliGRAM(s)/deciLiter  glucagon  Injectable 1 milliGRAM(s) IntraMuscular once PRN Glucose <70 milliGRAM(s)/deciLiter      OBJECTIVE    T(C): 36.7 (11-28-18 @ 11:13), Max: 36.9 (11-27-18 @ 21:07)  HR: 61 (11-28-18 @ 11:13) (61 - 76)  BP: 126/80 (11-28-18 @ 11:13) (121/76 - 143/83)  RR: 16 (11-28-18 @ 11:13) (15 - 18)  SpO2: 98% (11-28-18 @ 11:13) (93% - 98%)  Wt(kg): --  I&O's Summary    27 Nov 2018 07:01  -  28 Nov 2018 07:00  --------------------------------------------------------  IN: 1500 mL / OUT: 1600 mL / NET: -100 mL    Physical Exam:  · Constitutional	detailed exam	  · Constitutional Details	well-developed; well-groomed; well-nourished; no distress	  · Eyes	detailed exam	  · Eyes Details	PERRL; EOMI; conjunctiva clear	  · ENMT	No oral lesions; no gross abnormalities	  · Neck	detailed exam 	  · Neck Details	normal; supple; no JVD; normal thyroid gland	  · Breasts	detailed exam	  · Breasts Details	normal shape	  · Back	detailed exam 	  · Back Details	cannot attain upright position	  · Respiratory	detailed exam	  · Respiratory Details	normal; airway patent; breath sounds equal; good air movement; respirations non-labored; clear to auscultation bilaterally; no chest wall tenderness	  · Cardiovascular	detailed exam	  · Cardiovascular Details	regular rate and rhythm 	  · Cardiovascular Details	positive S1; positive S2	  · Gastrointestinal	detailed exam	  · GI Normal	normal; soft; nontender; no distention; no masses palpable; bowel sounds normal	  · Genitourinary	not examined 	  · Rectal	not examined 	  · Extremities	detailed exam 	  · Extremities Details	normal; no clubbing; no cyanosis; no pedal edema; l hand digits swelling and arthritis deformities	  · Vascular	Equal and normal pulses (carotid, femoral, dorsalis pedis) 	  · Neurological	detailed exam	  · Neurological Details	alert and oriented x 3; responds to pain; responds to verbal commands; LUE WEAKNESS	  · Skin	No lesions; no rash	  · Lymph Nodes	No lymphadedenopathy	  · Musculoskeletal	detailed exam	  · Musculoskeletal Details	no joint erythema; no joint warmth; no calf tenderness; decreased ROM; decreased ROM due to pain; joint swelling; L HAND AND LUE WEAKNESS	  · Psychiatric	Affect and characteristics of appearance, verbalizations, behaviors are appropriate	          LABS:                        13.0   7.30  )-----------( 340      ( 28 Nov 2018 07:19 )             40.7     11-28    141  |  108  |  12  ----------------------------<  198<H>  4.1   |  26  |  0.67    Ca    8.3<L>      28 Nov 2018 07:19  Mg     2.0     11-27    TPro  6.1  /  Alb  3.0<L>  /  TBili  0.1<L>  /  DBili  x   /  AST  9<L>  /  ALT  23  /  AlkPhos  79  11-27    CAPILLARY BLOOD GLUCOSE      POCT Blood Glucose.: 243 mg/dL (28 Nov 2018 11:39)  POCT Blood Glucose.: 207 mg/dL (28 Nov 2018 08:11)  POCT Blood Glucose.: 264 mg/dL (27 Nov 2018 21:43)  POCT Blood Glucose.: 235 mg/dL (27 Nov 2018 21:41)  POCT Blood Glucose.: 195 mg/dL (27 Nov 2018 16:49)    PT/INR - ( 27 Nov 2018 13:55 )   PT: 10.4 sec;   INR: 0.92 ratio               RADIOLOGY & ADDITIONAL TESTS:< from: MR Angio Head No Cont (11.27.18 @ 14:30) >  EXAM:  MR ANGIO BRAIN                          EXAM:  MR BRAIN                            PROCEDURE DATE:  11/27/2018          INTERPRETATION:  .    CLINICAL INFORMATION: Left-sided numbness.    TECHNIQUE: Multiplanar multi sequential MRI examination of the brain was   performed without the administration of IV gadolinium. MRA images through   the [place] [facility] were obtained using a combination of 2-D and 3-D   time-of-flight acquisition. The data was then reformatted into a   volumetric data set and reviewed as rotational MIP images.     COMPARISON: Comparison is made to the most recent prior CT examination of   the head from 11/26/2018. Comparison is also made to a prior brain MRI   examination and MRA examination of the head from 12/21/2011.    FINDINGS:    MRI Brain: There is an acute lacunar infarct within the right thalamus   with associated T2/FLAIR hyperintense signal compatible with cytotoxic   edema.    There is a very small right frontal chronic transcortical infarct (series   8, images 18-19).    Multiple additional patchy confluent nonspecific T2/FLAIR hyperintense   signal changes are noted throughout the bihemispheric white matter   without associated mass effect or restricted diffusion.    Ventricular size and configuration is unremarkable. No abnormal   extra-axial fluid collections are noted. Flow-voids are noted throughout   the major intracranial vessels, on the T2 weighted images, consistent   with their patency. The sella turcica and posterior fossa are   unremarkable.    The paranasal sinuses and mastoid air cells are clear. Calvarial signal   is normal. The orbits appear within normal limits.    MRA [place] [place] [facility]: The bilateral intracranial internal carotid,   anterior, and middle cerebral arteries appear unremarkable.    The anterior and bilateral posterior communicating arteries are notable.    The bilateral intradural vertebral arteries, vertebrobasilar junction,   basilar artery, and basilar tip appear unremarkable as well as the right   posterior cerebral artery. There is an unchanged 2 mm infundibulum off a   left medial posterior choroidal arterial branch (series 6, image 78).   Otherwise, the left posterior cerebral artery appears unremarkable.    IMPRESSION:    MRI brain: Acute lacunar infarct involving the right thalamus with   associated cytotoxic edema.    No acute intracranial hemorrhage.    Multiple additional nonspecific abnormal white matter foci of T2/FLAIR   prolongation statistically favoring microvascular disease.    MRA head: No large vessel occlusion or major stenosis.    < end of copied text >     reviewed elctronically  ASSESSMENT/PLAN:

## 2018-11-28 NOTE — PROGRESS NOTE ADULT - SUBJECTIVE AND OBJECTIVE BOX
CAPILLARY BLOOD GLUCOSE      POCT Blood Glucose.: 207 mg/dL (28 Nov 2018 08:11)  POCT Blood Glucose.: 264 mg/dL (27 Nov 2018 21:43)  POCT Blood Glucose.: 235 mg/dL (27 Nov 2018 21:41)  POCT Blood Glucose.: 195 mg/dL (27 Nov 2018 16:49)      Vital Signs Last 24 Hrs  T(C): 36.7 (28 Nov 2018 11:13), Max: 36.9 (27 Nov 2018 21:07)  T(F): 98 (28 Nov 2018 11:13), Max: 98.4 (27 Nov 2018 21:07)  HR: 61 (28 Nov 2018 11:13) (61 - 76)  BP: 126/80 (28 Nov 2018 11:13) (121/76 - 143/83)  BP(mean): --  RR: 16 (28 Nov 2018 11:13) (15 - 18)  SpO2: 98% (28 Nov 2018 11:13) (93% - 98%)    General: WN/WD NAD  Respiratory: CTA B/L  CV: RRR, S1S2, no murmurs, rubs or gallops  Abdominal: Soft, NT, ND +BS, Last BM  Extremities: No edema, + peripheral pulses    Hemoglobin A1C, Whole Blood: 10.4 % (11-27 @ 07:37)   11-28    141  |  108  |  12  ----------------------------<  198<H>  4.1   |  26  |  0.67    Ca    8.3<L>      28 Nov 2018 07:19  Mg     2.0     11-27    TPro  6.1  /  Alb  3.0<L>  /  TBili  0.1<L>  /  DBili  x   /  AST  9<L>  /  ALT  23  /  AlkPhos  79  11-27      dextrose 40% Gel 15 Gram(s) Oral once PRN  dextrose 50% Injectable 12.5 Gram(s) IV Push once  dextrose 50% Injectable 25 Gram(s) IV Push once  dextrose 50% Injectable 25 Gram(s) IV Push once  glucagon  Injectable 1 milliGRAM(s) IntraMuscular once PRN  insulin lispro (HumaLOG) corrective regimen sliding scale   SubCutaneous three times a day before meals  simvastatin 10 milliGRAM(s) Oral at bedtime

## 2018-11-28 NOTE — DISCHARGE NOTE ADULT - CARE PLAN
Principal Discharge DX:	CVA (cerebral vascular accident)  Goal:	R thalamic ,acute  Assessment and plan of treatment:	continue asa 325 mg po qd and statin Followup with neurologist in 2 -3 weeks Followup ECHO report in [facility] office ,see info below ,call for appointment  Secondary Diagnosis:	Diabetes mellitus  Assessment and plan of treatment:	Accu-Cheks monitoring and lantus/insulin sliding scale coverage, no concentrated sweets diet, serial labs and f/up with PMD, monitor HB A 1 C every 3-4 mnth Obtain samples from Dr Perlman office after d/c  Secondary Diagnosis:	Asthma  Assessment and plan of treatment:	continue home medications  Secondary Diagnosis:	History of back surgery  Assessment and plan of treatment:	continue home medications  Secondary Diagnosis:	Osteoarthritis  Assessment and plan of treatment:	Followup with rheumatologist in 2-3 weeks as per PCP referral  Secondary Diagnosis:	Prophylactic measure  Assessment and plan of treatment:	continue current managmnet and medications

## 2018-11-28 NOTE — DISCHARGE NOTE ADULT - CARE PROVIDER_API CALL
Melyssa Guerra), Neurology  [address] [place] [address]  Phone: [phone]  Fax: [phone] Melyssa Guerra), Neurology  [address] [place] [address]  Phone: [phone]  Fax: [phone]    Lb Sol), Internal Medicine; [place] [address]  Phone: [phone]  Fax: [phone]    Perlman, Craig D (), Medicine  [address] [place] [address] [address]  [place] [address]  Phone: [phone]  Fax: [phone] Melyssa Guerra), Neurology  [address] [place] [address]  Phone: [phone]  Fax: [phone]    Lb Sol (MD), Internal Medicine; Pulmonary Disease  [address] [place] [address]  Phone: [phone]  Fax: [phone]    Perlman, Craig D (DO), Medicine  [address] [place] [address] [address]  [place] [address]  Phone: [phone]  Fax: [phone]    Carmelo Villalta), Cardiology Medicine  [address] [facility] [place] [address]  Phone: [phone]  Fax: [phone]

## 2018-11-28 NOTE — DISCHARGE NOTE ADULT - PLAN OF CARE
R thalamic ,acute continue asa 325 mg po qd and statin Followup with neurologist in 2 -3 weeks Followup ECHO report in Dr SNEHAL FU ,[facility] office ,see info below ,call for appointment Accu-Cheks monitoring and lantus/insulin sliding scale coverage, no concentrated sweets diet, serial labs and f/up with PMD, monitor HB A 1 C every 3-4 mnth Obtain samples from Dr Perlman office after d/c continue home medications Followup with rheumatologist in 2-3 weeks as per PCP referral continue current managmnet and medications

## 2018-11-28 NOTE — DIETITIAN INITIAL EVALUATION ADULT. - ADHERENCE
patient reports eats cakes and ice cream PTA diet non compliance noted. takes oral medications for DM/fair

## 2018-11-28 NOTE — PROGRESS NOTE ADULT - SUBJECTIVE AND OBJECTIVE BOX
[facility] [place]       HPI:  63 y WFemale PMH: copd/asthma, diabetes, OA ,hx of knee ,back and shoulder sx presents with episode of numbness, tingling,  to left side of face, arm and left leg,  states she checked her finger stick it was over 400,  came to ED.  states she did not have any pain, no slurred speech.  states no recent illness , no fever, no cough, no chest pain, no sob ,admits arthralgia and hand joints deformities and swelling of digits of left hand for a while  ,but didnt seek for medical help or rheumatology eval  .CT brain reveled old infarcts ,no acute pathology .Neurology evaluation is requested Seen by cardiologist for BP control .Admit to telemetry unit for monitoring, send 3 sets of cardiac enzymes to rule out coronary event ,obtain ECHO to evaluate LVEF ,cardiology consult, continue current managmnet,O2 supply, anticoagulation plan as per cardiology consult .Palliative care consult requested ,to discuss advance directives and complete MOLST. Patient admits sometimes  using cane for ambulation .Her sister is at bedside and is very upset ,that "patient doesn't take care of herself and doesn't go to see doctors" (27 Nov 2018 07:06)        SUBJECTIVE:      ALLERGIES:  Allergies    Avelox (Seizure)  shellfish (Swelling)    Intolerances          MEDICATIONS  (STANDING):  acetaminophen   Tablet .. 650 milliGRAM(s) Oral every 8 hours  aspirin 325 milliGRAM(s) Oral daily  buDESOnide   0.5 milliGRAM(s) Respule 0.5 milliGRAM(s) Inhalation two times a day  dextrose 5%. 1000 milliLiter(s) (50 mL/Hr) IV Continuous <Continuous>  dextrose 50% Injectable 12.5 Gram(s) IV Push once  dextrose 50% Injectable 25 Gram(s) IV Push once  dextrose 50% Injectable 25 Gram(s) IV Push once  famotidine    Tablet 20 milliGRAM(s) Oral daily  heparin  Injectable 5000 Unit(s) SubCutaneous every 8 hours  insulin glargine Injectable (LANTUS) 10 Unit(s) SubCutaneous every morning  insulin lispro (HumaLOG) corrective regimen sliding scale   SubCutaneous three times a day before meals  insulin lispro (HumaLOG) corrective regimen sliding scale   SubCutaneous at bedtime  simvastatin 10 milliGRAM(s) Oral at bedtime    MEDICATIONS  (PRN):  ALBUTerol/ipratropium for Nebulization 3 milliLiter(s) Nebulizer every 6 hours PRN Shortness of Breath and/or Wheezing  dextrose 40% Gel 15 Gram(s) Oral once PRN Blood Glucose LESS THAN 70 milliGRAM(s)/deciLiter  glucagon  Injectable 1 milliGRAM(s) IntraMuscular once PRN Glucose <70 milliGRAM(s)/deciLiter      REVIEW OF SYSTEMS:  CONSTITUTIONAL: No fever,  RESPIRATORY: No cough, wheezing, shortness of breath  CARDIOVASCULAR: No chest pain, dyspnea, palpitations, dizziness, syncope, paroxysmal nocturnal dyspnea, orthopnea, or arm or leg swelling  GASTROINTESTINAL: No abdominal  or epigastric pain, nausea, vomiting,  diarrhea  NEUROLOGICAL: No headaches,  loss of strength, numbness, or tremors    Vital Signs Last 24 Hrs  T(C): 36.3 (28 Nov 2018 20:43), Max: 36.8 (28 Nov 2018 08:16)  T(F): 97.3 (28 Nov 2018 20:43), Max: 98.3 (28 Nov 2018 08:16)  HR: 65 (28 Nov 2018 20:43) (59 - 76)  BP: 115/72 (28 Nov 2018 20:43) (109/66 - 143/83)  BP(mean): --  RR: 17 (28 Nov 2018 20:43) (15 - 18)  SpO2: 97% (28 Nov 2018 20:43) (93% - 98%)    PHYSICAL EXAM:  HEAD:  Atraumatic, Normocephalic  NECK: Supple and normal thyroid.  No JVD or carotid bruit.   HEART: S1, S2 regular , 1/6 soft ejection systolic murmur in mitral area , no thrill and no gallops .  PULMONARY: Bilateral vesicular breathing , few scattered ronchi and few scattered rales are present .  ABDOMEN: Soft nontender and positive bowl sounds   EXTREMITIES:  No clubbing, cyanosis, or pedal  edema  NEUROLOGICAL: AAOX3 , no focal deficit .    LABS:                        13.0   7.30  )-----------( 340      ( 28 Nov 2018 07:19 )             40.7     11-28    141  |  108  |  12  ----------------------------<  198<H>  4.1   |  26  |  0.67    Ca    8.3<L>      28 Nov 2018 07:19  Mg     2.0     11-27    TPro  6.1  /  Alb  3.0<L>  /  TBili  0.1<L>  /  DBili  x   /  AST  9<L>  /  ALT  23  /  AlkPhos  79  11-27    CARDIAC MARKERS ( 27 Nov 2018 13:55 )  <.015 ng/mL / x     / x     / x     / x      CARDIAC MARKERS ( 27 Nov 2018 06:21 )  .022 ng/mL / x     / 58 U/L / x     / x          PT/INR - ( 27 Nov 2018 13:55 )   PT: 10.4 sec;   INR: 0.92 ratio             BNP      EKG:  ECHO:  IMAGING:    Assessment/Plan    Will continue to follow during hospital course and give further recommendations as needed . Thanks for your referral . if any questions please contact me at office ([phone])cell [phone]) [facility] [place]       HPI:  63 y WFemale PMH: copd/asthma, diabetes, OA ,hx of knee ,back and shoulder sx presents with episode of numbness, tingling,  to left side of face, arm and left leg,  states she checked her finger stick it was over 400,  came to ED.  states she did not have any pain, no slurred speech.  states no recent illness , no fever, no cough, no chest pain, no sob ,admits arthralgia and hand joints deformities and swelling of digits of left hand for a while  ,but didnt seek for medical help or rheumatology eval  .CT brain reveled old infarcts ,no acute pathology .Neurology evaluation is requested Seen by cardiologist for BP control .Admit to telemetry unit for monitoring, send 3 sets of cardiac enzymes to rule out coronary event ,obtain ECHO to evaluate LVEF ,cardiology consult, continue current managmnet,O2 supply, anticoagulation plan as per cardiology consult .Palliative care consult requested ,to discuss advance directives and complete MOLST. Patient admits sometimes  using cane for ambulation .Her sister is at bedside and is very upset ,that "patient doesn't take care of herself and doesn't go to see doctors" (27 Nov 2018 07:06)        SUBJECTIVE: Patient lying comfortable . No chest pain and SOB .      ALLERGIES:  Allergies    Avelox (Seizure)  shellfish (Swelling)    Intolerances          MEDICATIONS  (STANDING):  acetaminophen   Tablet .. 650 milliGRAM(s) Oral every 8 hours  aspirin 325 milliGRAM(s) Oral daily  buDESOnide   0.5 milliGRAM(s) Respule 0.5 milliGRAM(s) Inhalation two times a day  dextrose 5%. 1000 milliLiter(s) (50 mL/Hr) IV Continuous <Continuous>  dextrose 50% Injectable 12.5 Gram(s) IV Push once  dextrose 50% Injectable 25 Gram(s) IV Push once  dextrose 50% Injectable 25 Gram(s) IV Push once  famotidine    Tablet 20 milliGRAM(s) Oral daily  heparin  Injectable 5000 Unit(s) SubCutaneous every 8 hours  insulin glargine Injectable (LANTUS) 10 Unit(s) SubCutaneous every morning  insulin lispro (HumaLOG) corrective regimen sliding scale   SubCutaneous three times a day before meals  insulin lispro (HumaLOG) corrective regimen sliding scale   SubCutaneous at bedtime  simvastatin 10 milliGRAM(s) Oral at bedtime    MEDICATIONS  (PRN):  ALBUTerol/ipratropium for Nebulization 3 milliLiter(s) Nebulizer every 6 hours PRN Shortness of Breath and/or Wheezing  dextrose 40% Gel 15 Gram(s) Oral once PRN Blood Glucose LESS THAN 70 milliGRAM(s)/deciLiter  glucagon  Injectable 1 milliGRAM(s) IntraMuscular once PRN Glucose <70 milliGRAM(s)/deciLiter      REVIEW OF SYSTEMS:  CONSTITUTIONAL: No fever,  RESPIRATORY: No cough, wheezing, shortness of breath  CARDIOVASCULAR: No chest pain, dyspnea, palpitations, dizziness, syncope, paroxysmal nocturnal dyspnea, orthopnea, or arm or leg swelling  GASTROINTESTINAL: No abdominal  or epigastric pain, nausea, vomiting,  diarrhea  NEUROLOGICAL: No headaches,  loss of strength,  or tremors    Vital Signs Last 24 Hrs  T(C): 36.3 (28 Nov 2018 20:43), Max: 36.8 (28 Nov 2018 08:16)  T(F): 97.3 (28 Nov 2018 20:43), Max: 98.3 (28 Nov 2018 08:16)  HR: 65 (28 Nov 2018 20:43) (59 - 76)  BP: 115/72 (28 Nov 2018 20:43) (109/66 - 143/83)  BP(mean): --  RR: 17 (28 Nov 2018 20:43) (15 - 18)  SpO2: 97% (28 Nov 2018 20:43) (93% - 98%)    PHYSICAL EXAM:  HEAD:  Atraumatic, Normocephalic  NECK: Supple and normal thyroid.  No JVD or carotid bruit.   HEART: S1, S2 regular , 1/6 soft ejection systolic murmur in mitral area , no thrill and no gallops .  PULMONARY: Bilateral vesicular breathing , few scattered ronchi and few scattered rales are present .  ABDOMEN: Soft nontender and positive bowl sounds   EXTREMITIES:  No clubbing, cyanosis, or pedal  edema  NEUROLOGICAL: AAOX3 .    LABS:                        13.0   7.30  )-----------( 340      ( 28 Nov 2018 07:19 )             40.7     11-28    141  |  108  |  12  ----------------------------<  198<H>  4.1   |  26  |  0.67    Ca    8.3<L>      28 Nov 2018 07:19  Mg     2.0     11-27    TPro  6.1  /  Alb  3.0<L>  /  TBili  0.1<L>  /  DBili  x   /  AST  9<L>  /  ALT  23  /  AlkPhos  79  11-27    CARDIAC MARKERS ( 27 Nov 2018 13:55 )  <.015 ng/mL / x     / x     / x     / x      CARDIAC MARKERS ( 27 Nov 2018 06:21 )  .022 ng/mL / x     / 58 U/L / x     / x          PT/INR - ( 27 Nov 2018 13:55 )   PT: 10.4 sec;   INR: 0.92 ratio               Assessment/Plan  Patient has :  1) S/P CVA . LVEF 60% .  2) Uncontrolled DM .  3) Hyperlipidemia .  Plan : 1) Cardiac stable so far . 2) Change simvastatin to atorvastatin .  Will continue to follow during hospital course and give further recommendations as needed . Thanks for your referral . if any questions please contact me at office ([phone])cell [phone])

## 2018-11-28 NOTE — DISCHARGE NOTE ADULT - NS AS DC STROKE ED MATERIALS
Stroke Warning Signs and Symptoms/Stroke Education Booklet/Risk Factors for Stroke/Prescribed Medications/Need for Followup After Discharge/Call 911 for Stroke

## 2018-11-29 DIAGNOSIS — E11.65 TYPE 2 DIABETES MELLITUS WITH HYPERGLYCEMIA: ICD-10-CM

## 2018-11-29 LAB
ANION GAP SERPL CALC-SCNC: 7 MMOL/L — SIGNIFICANT CHANGE UP (ref 5–17)
BUN SERPL-MCNC: 14 MG/DL — SIGNIFICANT CHANGE UP (ref 7–23)
CALCIUM SERPL-MCNC: 8.7 MG/DL — SIGNIFICANT CHANGE UP (ref 8.5–10.1)
CHLORIDE SERPL-SCNC: 104 MMOL/L — SIGNIFICANT CHANGE UP (ref 96–108)
CO2 SERPL-SCNC: 28 MMOL/L — SIGNIFICANT CHANGE UP (ref 22–31)
CREAT SERPL-MCNC: 0.82 MG/DL — SIGNIFICANT CHANGE UP (ref 0.5–1.3)
GLUCOSE SERPL-MCNC: 240 MG/DL — HIGH (ref 70–99)
HCT VFR BLD CALC: 43.1 % — SIGNIFICANT CHANGE UP (ref 34.5–45)
HGB BLD-MCNC: 13.9 G/DL — SIGNIFICANT CHANGE UP (ref 11.5–15.5)
MCHC RBC-ENTMCNC: 27.5 PG — SIGNIFICANT CHANGE UP (ref 27–34)
MCHC RBC-ENTMCNC: 32.3 GM/DL — SIGNIFICANT CHANGE UP (ref 32–36)
MCV RBC AUTO: 85.2 FL — SIGNIFICANT CHANGE UP (ref 80–100)
NRBC # BLD: 0 /100 WBCS — SIGNIFICANT CHANGE UP (ref 0–0)
PLATELET # BLD AUTO: 359 K/UL — SIGNIFICANT CHANGE UP (ref 150–400)
POTASSIUM SERPL-MCNC: 4 MMOL/L — SIGNIFICANT CHANGE UP (ref 3.5–5.3)
POTASSIUM SERPL-SCNC: 4 MMOL/L — SIGNIFICANT CHANGE UP (ref 3.5–5.3)
RBC # BLD: 5.06 M/UL — SIGNIFICANT CHANGE UP (ref 3.8–5.2)
RBC # FLD: 12.2 % — SIGNIFICANT CHANGE UP (ref 10.3–14.5)
SODIUM SERPL-SCNC: 139 MMOL/L — SIGNIFICANT CHANGE UP (ref 135–145)
WBC # BLD: 6.91 K/UL — SIGNIFICANT CHANGE UP (ref 3.8–10.5)
WBC # FLD AUTO: 6.91 K/UL — SIGNIFICANT CHANGE UP (ref 3.8–10.5)

## 2018-11-29 RX ORDER — METFORMIN HYDROCHLORIDE 850 MG/1
1000 TABLET ORAL
Qty: 0 | Refills: 0 | Status: DISCONTINUED | OUTPATIENT
Start: 2018-11-29 | End: 2018-11-30

## 2018-11-29 RX ORDER — METFORMIN HYDROCHLORIDE 850 MG/1
1 TABLET ORAL
Qty: 0 | Refills: 0 | DISCHARGE
Start: 2018-11-29

## 2018-11-29 RX ORDER — ATORVASTATIN CALCIUM 80 MG/1
20 TABLET, FILM COATED ORAL AT BEDTIME
Qty: 0 | Refills: 0 | Status: DISCONTINUED | OUTPATIENT
Start: 2018-11-29 | End: 2018-11-30

## 2018-11-29 RX ORDER — METFORMIN HYDROCHLORIDE 850 MG/1
1.5 TABLET ORAL
Qty: 0 | Refills: 0 | DISCHARGE
Start: 2018-11-29

## 2018-11-29 RX ORDER — ATORVASTATIN CALCIUM 80 MG/1
1 TABLET, FILM COATED ORAL
Qty: 0 | Refills: 0 | DISCHARGE
Start: 2018-11-29

## 2018-11-29 RX ADMIN — HEPARIN SODIUM 5000 UNIT(S): 5000 INJECTION INTRAVENOUS; SUBCUTANEOUS at 13:35

## 2018-11-29 RX ADMIN — FAMOTIDINE 20 MILLIGRAM(S): 10 INJECTION INTRAVENOUS at 13:33

## 2018-11-29 RX ADMIN — Medication 325 MILLIGRAM(S): at 13:33

## 2018-11-29 RX ADMIN — Medication 0.5 MILLIGRAM(S): at 07:21

## 2018-11-29 RX ADMIN — Medication 2: at 17:19

## 2018-11-29 RX ADMIN — HEPARIN SODIUM 5000 UNIT(S): 5000 INJECTION INTRAVENOUS; SUBCUTANEOUS at 21:32

## 2018-11-29 RX ADMIN — METFORMIN HYDROCHLORIDE 1000 MILLIGRAM(S): 850 TABLET ORAL at 17:32

## 2018-11-29 RX ADMIN — Medication 650 MILLIGRAM(S): at 06:33

## 2018-11-29 RX ADMIN — Medication 0.5 MILLIGRAM(S): at 19:38

## 2018-11-29 RX ADMIN — INSULIN GLARGINE 10 UNIT(S): 100 INJECTION, SOLUTION SUBCUTANEOUS at 09:11

## 2018-11-29 RX ADMIN — Medication 4: at 08:19

## 2018-11-29 RX ADMIN — Medication 650 MILLIGRAM(S): at 05:32

## 2018-11-29 RX ADMIN — Medication 2: at 12:38

## 2018-11-29 RX ADMIN — HEPARIN SODIUM 5000 UNIT(S): 5000 INJECTION INTRAVENOUS; SUBCUTANEOUS at 05:33

## 2018-11-29 NOTE — OCCUPATIONAL THERAPY INITIAL EVALUATION ADULT - ADDITIONAL COMMENTS
Pt lives with  in private home [address] no hand rail.  Pt has full flight to second floor with one hand rail.  Pt was independent in all ADLs and ambulation without an assistive device.  Pt has tub shower with curtain and shower seat. Pt lives with  in private home [address] no hand rail.  Pt has full flight to second floor with one hand rail.  Bedroom and bathroom main floor. Pt was independent in all ADLs and ambulation without an assistive device.  Pt has tub shower with curtain. Pt owns a shower chair and cane. Pt reports that she only ambulated using a cane outdoors when walking to the park with her grandchildren. Spouse works during the day. Pt reports that her sister lives nearby and is supportive. Pt is right hand dominant.

## 2018-11-29 NOTE — OCCUPATIONAL THERAPY INITIAL EVALUATION ADULT - ADL RETRAINING, OT EVAL
1 person assist/verbal cues/nonverbal cues (demo/gestures)
Patient will dress lower body with supervision in 1-3 sessions.

## 2018-11-29 NOTE — OCCUPATIONAL THERAPY INITIAL EVALUATION ADULT - ORIENTATION, REHAB EVAL
oriented to person, place, time and situation/Patient educated regarding fall prevention and home/hospital safety. Pt educated on use of AE/DME recommended for safety & the need to call for assistance. Role of OT and patient goals discussed.

## 2018-11-29 NOTE — PROGRESS NOTE ADULT - SUBJECTIVE AND OBJECTIVE BOX
[facility] [place]       HPI:  63 y WFemale PMH: copd/asthma, diabetes, OA ,hx of knee ,back and shoulder sx presents with episode of numbness, tingling,  to left side of face, arm and left leg,  states she checked her finger stick it was over 400,  came to ED.  states she did not have any pain, no slurred speech.  states no recent illness , no fever, no cough, no chest pain, no sob ,admits arthralgia and hand joints deformities and swelling of digits of left hand for a while  ,but didnt seek for medical help or rheumatology eval  .CT brain reveled old infarcts ,no acute pathology .Neurology evaluation is requested Seen by cardiologist for BP control .Admit to telemetry unit for monitoring, send 3 sets of cardiac enzymes to rule out coronary event ,obtain ECHO to evaluate LVEF ,cardiology consult, continue current managmnet,O2 supply, anticoagulation plan as per cardiology consult .Palliative care consult requested ,to discuss advance directives and complete MOLST. Patient admits sometimes  using cane for ambulation .Her sister is at bedside and is very upset ,that "patient doesn't take care of herself and doesn't go to see doctors" (27 Nov 2018 07:06)        SUBJECTIVE: Patient lying comfortable . No chest pain and SOB .,      ALLERGIES:  Allergies    Avelox (Seizure)  shellfish (Swelling)    Intolerances          MEDICATIONS  (STANDING):  aspirin 325 milliGRAM(s) Oral daily  atorvastatin 20 milliGRAM(s) Oral at bedtime  buDESOnide   0.5 milliGRAM(s) Respule 0.5 milliGRAM(s) Inhalation two times a day  dextrose 5%. 1000 milliLiter(s) (50 mL/Hr) IV Continuous <Continuous>  dextrose 50% Injectable 12.5 Gram(s) IV Push once  dextrose 50% Injectable 25 Gram(s) IV Push once  dextrose 50% Injectable 25 Gram(s) IV Push once  famotidine    Tablet 20 milliGRAM(s) Oral daily  heparin  Injectable 5000 Unit(s) SubCutaneous every 8 hours  insulin glargine Injectable (LANTUS) 10 Unit(s) SubCutaneous every morning  insulin lispro (HumaLOG) corrective regimen sliding scale   SubCutaneous three times a day before meals  insulin lispro (HumaLOG) corrective regimen sliding scale   SubCutaneous at bedtime  metFORMIN 1000 milliGRAM(s) Oral two times a day    MEDICATIONS  (PRN):  ALBUTerol/ipratropium for Nebulization 3 milliLiter(s) Nebulizer every 6 hours PRN Shortness of Breath and/or Wheezing  dextrose 40% Gel 15 Gram(s) Oral once PRN Blood Glucose LESS THAN 70 milliGRAM(s)/deciLiter  glucagon  Injectable 1 milliGRAM(s) IntraMuscular once PRN Glucose <70 milliGRAM(s)/deciLiter      REVIEW OF SYSTEMS:  CONSTITUTIONAL: No fever,  RESPIRATORY: No cough, wheezing, shortness of breath  CARDIOVASCULAR: No chest pain, dyspnea, palpitations, dizziness, syncope, paroxysmal nocturnal dyspnea, orthopnea, or arm or leg swelling  GASTROINTESTINAL: No abdominal  or epigastric pain, nausea, vomiting,  diarrhea  NEUROLOGICAL: No headaches,  loss of strength, numbness, or tremors    Vital Signs Last 24 Hrs  T(C): 36.8 (29 Nov 2018 19:56), Max: 36.8 (28 Nov 2018 23:33)  T(F): 98.3 (29 Nov 2018 19:56), Max: 98.3 (29 Nov 2018 19:56)  HR: 60 (29 Nov 2018 19:56) (54 - 71)  BP: 122/76 (29 Nov 2018 19:56) (109/68 - 129/79)  BP(mean): --  RR: 18 (29 Nov 2018 19:56) (17 - 20)  SpO2: 98% (29 Nov 2018 19:56) (95% - 98%)    PHYSICAL EXAM:  HEAD:  Atraumatic, Normocephalic  NECK: Supple and normal thyroid.  No JVD or carotid bruit.   HEART: S1, S2 regular , 1/6 soft ejection systolic murmur in mitral area , no thrill and no gallops .  PULMONARY: Bilateral vesicular breathing , few scattered ronchi and few scattered rales are present .  ABDOMEN: Soft nontender and positive bowl sounds   EXTREMITIES:  No clubbing, cyanosis, or pedal  edema  NEUROLOGICAL: AAOX3 , no focal deficit .    LABS:                        13.9   6.91  )-----------( 359      ( 29 Nov 2018 06:24 )             43.1     11-29    139  |  104  |  14  ----------------------------<  240<H>  4.0   |  28  |  0.82    Ca    8.7      29 Nov 2018 06:24              BNP      EKG:  ECHO:  IMAGING:    Assessment/Plan    Will continue to follow during hospital course and give further recommendations as needed . Thanks for your referral . if any questions please contact me at office ([phone])cell [phone]) [facility] [place]       HPI:  63 y WFemale PMH: copd/asthma, diabetes, OA ,hx of knee ,back and shoulder sx presents with episode of numbness, tingling,  to left side of face, arm and left leg,  states she checked her finger stick it was over 400,  came to ED.  states she did not have any pain, no slurred speech.  states no recent illness , no fever, no cough, no chest pain, no sob ,admits arthralgia and hand joints deformities and swelling of digits of left hand for a while  ,but didnt seek for medical help or rheumatology eval  .CT brain reveled old infarcts ,no acute pathology .Neurology evaluation is requested Seen by cardiologist for BP control .Admit to telemetry unit for monitoring, send 3 sets of cardiac enzymes to rule out coronary event ,obtain ECHO to evaluate LVEF ,cardiology consult, continue current managmnet,O2 supply, anticoagulation plan as per cardiology consult .Palliative care consult requested ,to discuss advance directives and complete MOLST. Patient admits sometimes  using cane for ambulation .Her sister is at bedside and is very upset ,that "patient doesn't take care of herself and doesn't go to see doctors" (27 Nov 2018 07:06)        SUBJECTIVE: Patient lying comfortable . No chest pain and SOB .,      ALLERGIES:  Allergies    Avelox (Seizure)  shellfish (Swelling)    Intolerances          MEDICATIONS  (STANDING):  aspirin 325 milliGRAM(s) Oral daily  atorvastatin 20 milliGRAM(s) Oral at bedtime  buDESOnide   0.5 milliGRAM(s) Respule 0.5 milliGRAM(s) Inhalation two times a day  dextrose 5%. 1000 milliLiter(s) (50 mL/Hr) IV Continuous <Continuous>  dextrose 50% Injectable 12.5 Gram(s) IV Push once  dextrose 50% Injectable 25 Gram(s) IV Push once  dextrose 50% Injectable 25 Gram(s) IV Push once  famotidine    Tablet 20 milliGRAM(s) Oral daily  heparin  Injectable 5000 Unit(s) SubCutaneous every 8 hours  insulin glargine Injectable (LANTUS) 10 Unit(s) SubCutaneous every morning  insulin lispro (HumaLOG) corrective regimen sliding scale   SubCutaneous three times a day before meals  insulin lispro (HumaLOG) corrective regimen sliding scale   SubCutaneous at bedtime  metFORMIN 1000 milliGRAM(s) Oral two times a day    MEDICATIONS  (PRN):  ALBUTerol/ipratropium for Nebulization 3 milliLiter(s) Nebulizer every 6 hours PRN Shortness of Breath and/or Wheezing  dextrose 40% Gel 15 Gram(s) Oral once PRN Blood Glucose LESS THAN 70 milliGRAM(s)/deciLiter  glucagon  Injectable 1 milliGRAM(s) IntraMuscular once PRN Glucose <70 milliGRAM(s)/deciLiter      REVIEW OF SYSTEMS:  CONSTITUTIONAL: No fever,  RESPIRATORY: No cough, wheezing, shortness of breath  CARDIOVASCULAR: No chest pain, dyspnea, palpitations, dizziness, syncope, paroxysmal nocturnal dyspnea, orthopnea, or arm or leg swelling  GASTROINTESTINAL: No abdominal  or epigastric pain, nausea, vomiting,  diarrhea  NEUROLOGICAL: No headaches,  loss of strength  or tremors    Vital Signs Last 24 Hrs  T(C): 36.8 (29 Nov 2018 19:56), Max: 36.8 (28 Nov 2018 23:33)  T(F): 98.3 (29 Nov 2018 19:56), Max: 98.3 (29 Nov 2018 19:56)  HR: 60 (29 Nov 2018 19:56) (54 - 71)  BP: 122/76 (29 Nov 2018 19:56) (109/68 - 129/79)  BP(mean): --  RR: 18 (29 Nov 2018 19:56) (17 - 20)  SpO2: 98% (29 Nov 2018 19:56) (95% - 98%)    PHYSICAL EXAM:  HEAD:  Atraumatic, Normocephalic  NECK: Supple and normal thyroid.  No JVD or carotid bruit.   HEART: S1, S2 regular , 1/6 soft ejection systolic murmur in mitral area , no thrill and no gallops .  PULMONARY: Bilateral vesicular breathing , few scattered ronchi and few scattered rales are present .  ABDOMEN: Soft nontender and positive bowl sounds   EXTREMITIES:  No clubbing, cyanosis, or pedal  edema  NEUROLOGICAL: AAOX3 ,     LABS:                        13.9   6.91  )-----------( 359      ( 29 Nov 2018 06:24 )             43.1     11-29    139  |  104  |  14  ----------------------------<  240<H>  4.0   |  28  |  0.82    Ca    8.7      29 Nov 2018 06:24              Assessment/Plan  Patient has :  1 ) S/P CVA and improved .  2) EKG abnormalities .  3) DM .  Plan : cardiac stable so far .  Will continue to follow during hospital course and give further recommendations as needed . Thanks for your referral . if any questions please contact me at office ([phone])cell [phone])

## 2018-11-29 NOTE — OCCUPATIONAL THERAPY INITIAL EVALUATION ADULT - PERTINENT HX OF CURRENT PROBLEM, REHAB EVAL
62 y/o female admitted with CVA. MR brain 11/27/18 +acute lacunar infarct within Right thalamus w/associated cytotoxic edema. No acute intracranial hemorrhage. TTE Echo (-)evidence for significant carotid stenosis.

## 2018-11-29 NOTE — PROGRESS NOTE ADULT - SUBJECTIVE AND OBJECTIVE BOX
PROGRESS NOTE  Patient is a 63y old  Female who presents with a chief complaint of Hyperglycemia and LUE weakness (28 Nov 2018 22:27)  Chart and available morning labs /imaging are reviewed electronically , urgent issues addressed . More information  is being added upon completion of rounds , when more information is collected and management discussed with consultants , medical staff and social service/case management on the floor     OVERNIGHT      HPI:  63 y WFemale PMH: copd/asthma, diabetes, OA ,hx of knee ,back and shoulder sx presents with episode of numbness, tingling,  to left side of face, arm and left leg,  states she checked her finger stick it was over 400,  came to ED.  states she did not have any pain, no slurred speech.  states no recent illness , no fever, no cough, no chest pain, no sob ,admits arthralgia and hand joints deformities and swelling of digits of left hand for a while  ,but didnt seek for medical help or rheumatology eval  .CT brain reveled old infarcts ,no acute pathology .Neurology evaluation is requested Seen by cardiologist for BP control .Admit to telemetry unit for monitoring, send 3 sets of cardiac enzymes to rule out coronary event ,obtain ECHO to evaluate LVEF ,cardiology consult, continue current managmnet,O2 supply, anticoagulation plan as per cardiology consult .Palliative care consult requested ,to discuss advance directives and complete MOLST. Patient admits sometimes  using cane for ambulation .Her sister is at bedside and is very upset ,that "patient doesn't take care of herself and doesn't go to see doctors" (27 Nov 2018 07:06)    PAST MEDICAL & SURGICAL HISTORY:  Asthma  Diabetes mellitus  H/O shoulder surgery  History of back surgery  S/P cholecystectomy  H/O knee surgery      MEDICATIONS  (STANDING):  aspirin 325 milliGRAM(s) Oral daily  atorvastatin 20 milliGRAM(s) Oral at bedtime  buDESOnide   0.5 milliGRAM(s) Respule 0.5 milliGRAM(s) Inhalation two times a day  dextrose 5%. 1000 milliLiter(s) (50 mL/Hr) IV Continuous <Continuous>  dextrose 50% Injectable 12.5 Gram(s) IV Push once  dextrose 50% Injectable 25 Gram(s) IV Push once  dextrose 50% Injectable 25 Gram(s) IV Push once  famotidine    Tablet 20 milliGRAM(s) Oral daily  heparin  Injectable 5000 Unit(s) SubCutaneous every 8 hours  insulin glargine Injectable (LANTUS) 10 Unit(s) SubCutaneous every morning  insulin lispro (HumaLOG) corrective regimen sliding scale   SubCutaneous three times a day before meals  insulin lispro (HumaLOG) corrective regimen sliding scale   SubCutaneous at bedtime    MEDICATIONS  (PRN):  ALBUTerol/ipratropium for Nebulization 3 milliLiter(s) Nebulizer every 6 hours PRN Shortness of Breath and/or Wheezing  dextrose 40% Gel 15 Gram(s) Oral once PRN Blood Glucose LESS THAN 70 milliGRAM(s)/deciLiter  glucagon  Injectable 1 milliGRAM(s) IntraMuscular once PRN Glucose <70 milliGRAM(s)/deciLiter      OBJECTIVE    T(C): 36.5 (11-29-18 @ 07:29), Max: 36.8 (11-28-18 @ 23:33)  HR: 62 (11-29-18 @ 07:30) (59 - 76)  BP: 115/82 (11-29-18 @ 07:29) (109/66 - 126/80)  RR: 20 (11-29-18 @ 07:29) (16 - 20)  SpO2: 97% (11-29-18 @ 07:30) (95% - 98%)  Wt(kg): --  I&O's Summary    28 Nov 2018 07:01  -  29 Nov 2018 07:00  --------------------------------------------------------  IN: 660 mL / OUT: 0 mL / NET: 660 mL          REVIEW OF SYSTEMS:  CONSTITUTIONAL: No fever, weight loss, or fatigue  EYES: No eye pain, visual disturbances, or discharge  ENMT:   No sinus or throat pain  NECK: No pain or stiffness  RESPIRATORY: No cough, wheezing, chills or hemoptysis; No shortness of breath  CARDIOVASCULAR: No chest pain, palpitations, dizziness, or leg swelling  GASTROINTESTINAL: No abdominal pain. No nausea, vomiting; No diarrhea or constipation. No melena or hematochezia.  GENITOURINARY: No dysuria, frequency, hematuria, or incontinence  NEUROLOGICAL: No headaches, memory loss, loss of strength, numbness, or tremors  SKIN: No itching, burning, rashes, or lesions   MUSCULOSKELETAL: No joint pain or swelling; No muscle, back, or extremity pain    PHYSICAL EXAM:  Appearance: NAD. VS past 24 hrs -as above   HEENT:   Moist oral mucosa. Conjunctiva clear b/l.   Neck : supple  Respiratory: Lungs CTAB.  Gastrointestinal:  Soft, nontender. No rebound. No rigidity. BS present	  Cardiovascular: RRR ,S1S2 present  Neurologic: Non-focal. Moving all extremities.  Extremities: No edema. No erythema. No calf tenderness.  Skin: No rashes, No ecchymoses, No cyanosis.	  wounds ,skin lesions-See skin assesment flow sheet   LABS:                        13.9   6.91  )-----------( 359      ( 29 Nov 2018 06:24 )             43.1     11-29    139  |  104  |  14  ----------------------------<  240<H>  4.0   |  28  |  0.82    Ca    8.7      29 Nov 2018 06:24      CAPILLARY BLOOD GLUCOSE      POCT Blood Glucose.: 239 mg/dL (29 Nov 2018 08:05)  POCT Blood Glucose.: 174 mg/dL (28 Nov 2018 21:13)  POCT Blood Glucose.: 255 mg/dL (28 Nov 2018 17:04)  POCT Blood Glucose.: 243 mg/dL (28 Nov 2018 11:39)    PT/INR - ( 27 Nov 2018 13:55 )   PT: 10.4 sec;   INR: 0.92 ratio               RADIOLOGY & ADDITIONAL TESTS:   reviewed elctronically  ASSESSMENT/PLAN: PROGRESS NOTE  Patient is a 63y old  Female who presents with a chief complaint of Hyperglycemia and LUE weakness (28 Nov 2018 22:27)  Chart and available morning labs /imaging are reviewed electronically , urgent issues addressed . More information  is being added upon completion of rounds , when more information is collected and management discussed with consultants , medical staff and social service/case management on the floor   Chart and available morning labs /imaging are reviewed electronically , urgent issues addressed . More information  is being added upon completion of rounds , when more information is collected and management discussed with consultants , medical staff and social service/case management on the floor   OVERNIGHT  No new issues reported by medical staff . All above noted .Patient is resting in a bed comfortably  .No distress noted     HPI:  63 y WFemale PMH: copd/asthma, diabetes, OA ,hx of knee ,back and shoulder sx presents with episode of numbness, tingling,  to left side of face, arm and left leg,  states she checked her finger stick it was over 400,  came to ED.  states she did not have any pain, no slurred speech.  states no recent illness , no fever, no cough, no chest pain, no sob ,admits arthralgia and hand joints deformities and swelling of digits of left hand for a while  ,but didnt seek for medical help or rheumatology eval  .CT brain reveled old infarcts ,no acute pathology .Neurology evaluation is requested Seen by cardiologist for BP control .Admit to telemetry unit for monitoring, send 3 sets of cardiac enzymes to rule out coronary event ,obtain ECHO to evaluate LVEF ,cardiology consult, continue current managmnet,O2 supply, anticoagulation plan as per cardiology consult .Palliative care consult requested ,to discuss advance directives and complete MOLST. Patient admits sometimes  using cane for ambulation .Her sister is at bedside and is very upset ,that "patient doesn't take care of herself and doesn't go to see doctors" (27 Nov 2018 07:06)  PAST MEDICAL & SURGICAL HISTORY:  Asthma  Diabetes mellitus  H/O shoulder surgery  History of back surgery  S/P cholecystectomy  H/O knee surgery  MEDICATIONS  (STANDING):  aspirin 325 milliGRAM(s) Oral daily  atorvastatin 20 milliGRAM(s) Oral at bedtime  buDESOnide   0.5 milliGRAM(s) Respule 0.5 milliGRAM(s) Inhalation two times a day  dextrose 5%. 1000 milliLiter(s) (50 mL/Hr) IV Continuous <Continuous>  dextrose 50% Injectable 12.5 Gram(s) IV Push once  dextrose 50% Injectable 25 Gram(s) IV Push once  dextrose 50% Injectable 25 Gram(s) IV Push once  famotidine    Tablet 20 milliGRAM(s) Oral daily  heparin  Injectable 5000 Unit(s) SubCutaneous every 8 hours  insulin glargine Injectable (LANTUS) 10 Unit(s) SubCutaneous every morning  insulin lispro (HumaLOG) corrective regimen sliding scale   SubCutaneous three times a day before meals  insulin lispro (HumaLOG) corrective regimen sliding scale   SubCutaneous at bedtime  MEDICATIONS  (PRN):  ALBUTerol/ipratropium for Nebulization 3 milliLiter(s) Nebulizer every 6 hours PRN Shortness of Breath and/or Wheezing  dextrose 40% Gel 15 Gram(s) Oral once PRN Blood Glucose LESS THAN 70 milliGRAM(s)/deciLiter  glucagon  Injectable 1 milliGRAM(s) IntraMuscular once PRN Glucose <70 milliGRAM(s)/deciLiter  OBJECTIVE  T(C): 36.5 (11-29-18 @ 07:29), Max: 36.8 (11-28-18 @ 23:33)  HR: 62 (11-29-18 @ 07:30) (59 - 76)  BP: 115/82 (11-29-18 @ 07:29) (109/66 - 126/80)  RR: 20 (11-29-18 @ 07:29) (16 - 20)  SpO2: 97% (11-29-18 @ 07:30) (95% - 98%)  Wt(kg): --  I&O's Summary  28 Nov 2018 07:01  -  29 Nov 2018 07:00  --------------------------------------------------------  IN: 660 mL / OUT: 0 mL / NET: 660 mL  Physical Exam:  · Constitutional	detailed exam	  · Constitutional Details	well-developed; well-groomed; well-nourished; no distress	  · Eyes	detailed exam	  · Eyes Details	PERRL; EOMI; conjunctiva clear	  · ENMT	No oral lesions; no gross abnormalities	  · Neck	detailed exam 	  · Neck Details	normal; supple; no JVD; normal thyroid gland	  · Breasts	detailed exam	  · Breasts Details	normal shape	  · Back	detailed exam 	  · Back Details	cannot attain upright position	  · Respiratory	detailed exam	  · Respiratory Details	normal; airway patent; breath sounds equal; good air movement; respirations non-labored; clear to auscultation bilaterally; no chest wall tenderness	  · Cardiovascular	detailed exam	  · Cardiovascular Details	regular rate and rhythm 	  · Cardiovascular Details	positive S1; positive S2	  · Gastrointestinal	detailed exam	  · GI Normal	normal; soft; nontender; no distention; no masses palpable; bowel sounds normal	  · Genitourinary	not examined 	  · Rectal	not examined 	  · Extremities	detailed exam 	  · Extremities Details	normal; no clubbing; no cyanosis; no pedal edema; l hand digits swelling and arthritis deformities	  · Vascular	Equal and normal pulses (carotid, femoral, dorsalis pedis) 	  · Neurological	detailed exam	  · Neurological Details	alert and oriented x 3; responds to pain; responds to verbal commands; LUE WEAKNESS	  · Skin	No lesions; no rash	  · Lymph Nodes	No lymphadedenopathy	  · Musculoskeletal	detailed exam	  · Musculoskeletal Details	no joint erythema; no joint warmth; no calf tenderness; decreased ROM; decreased ROM due to pain; joint swelling; L HAND/ARM  WEAKNESS	  · Psychiatric	Affect and characteristics of appearance, verbalizations, behaviors are appropriate	    LABS:                        13.9   6.91  )-----------( 359      ( 29 Nov 2018 06:24 )             43.1   11-29  139  |  104  |  14  ----------------------------<  240<H>  4.0   |  28  |  0.82  Ca    8.7      29 Nov 2018 06:24  CAPILLARY BLOOD GLUCOSE  POCT Blood Glucose.: 239 mg/dL (29 Nov 2018 08:05)  POCT Blood Glucose.: 174 mg/dL (28 Nov 2018 21:13)  POCT Blood Glucose.: 255 mg/dL (28 Nov 2018 17:04)  POCT Blood Glucose.: 243 mg/dL (28 Nov 2018 11:39)  PT/INR - ( 27 Nov 2018 13:55 )   PT: 10.4 sec;   INR: 0.92 ratio    RADIOLOGY & ADDITIONAL TESTS:< from: MR Angio Head No Cont (11.27.18 @ 14:30) >  EXAM:  MR ANGIO BRAIN                          EXAM:  MR BRAIN                            PROCEDURE DATE:  11/27/2018          INTERPRETATION:  .    CLINICAL INFORMATION: Left-sided numbness.    TECHNIQUE: Multiplanar multi sequential MRI examination of the brain was   performed without the administration of IV gadolinium. MRA images through   the [place] [facility] [place] were obtained using a combination of 2-D and 3-D   time-of-flight acquisition. The data was then reformatted into a   volumetric data set and reviewed as rotational MIP images.     COMPARISON: Comparison is made to the most recent prior CT examination of   the head from 11/26/2018. Comparison is also made to a prior brain MRI   examination and MRA examination of the head from 12/21/2011.    FINDINGS:    MRI Brain: There is an acute lacunar infarct within the right thalamus   with associated T2/FLAIR hyperintense signal compatible with cytotoxic   edema.    There is a very small right frontal chronic transcortical infarct (series   8, images 18-19).    Multiple additional patchy confluent nonspecific T2/FLAIR hyperintense   signal changes are noted throughout the bihemispheric white matter   without associated mass effect or restricted diffusion.    Ventricular size and configuration is unremarkable. No abnormal   extra-axial fluid collections are noted. Flow-voids are noted throughout   the major intracranial vessels, on the T2 weighted images, consistent   with their patency. The sella turcica and posterior fossa are   unremarkable.    The paranasal sinuses and mastoid air cells are clear. Calvarial signal   is normal. The orbits appear within normal limits.    MRA [place]: The bilateral intracranial internal carotid,   anterior, and middle cerebral arteries appear unremarkable.    The anterior and bilateral posterior communicating arteries are notable.    The bilateral intradural vertebral arteries, vertebrobasilar junction,   basilar artery, and basilar tip appear unremarkable as well as the right   posterior cerebral artery. There is an unchanged 2 mm infundibulum off a   left medial posterior choroidal arterial branch (series 6, image 78).   Otherwise, the left posterior cerebral artery appears unremarkable.    IMPRESSION:    MRI brain: Acute lacunar infarct involving the right thalamus with   associated cytotoxic edema.    No acute intracranial hemorrhage.    Multiple additional nonspecific abnormal white matter foci of T2/FLAIR   prolongation statistically favoring microvascular disease.    MRA head: No large vessel occlusion or major stenosis.    < end of copied text >     reviewed elctronically  ASSESSMENT/PLAN: 	  Patient was seen and examined on a day of discharge . Plan of care , discharge medications and recommendations discussed with consultants and clearance for discharge obtained .Social service , case managment  and medical staff are aware of plan. Family is notified. Discharge summary  is  prepared electronically

## 2018-11-29 NOTE — OCCUPATIONAL THERAPY INITIAL EVALUATION ADULT - RANGE OF MOTION EXAMINATION, UPPER EXTREMITY
bilateral UE Active ROM was WFL  (within functional limits)/Muscle strength: WNL's. Fine motor skills: minimally impaired Left hand due to impaired sensation. bilateral UE Active ROM was WFL  (within functional limits)/Muscle strength: WNL's. Fine motor skills: moderately impaired Left hand due to impaired sensation. Arthritic changes noted b/l hands.

## 2018-11-29 NOTE — PROGRESS NOTE ADULT - SUBJECTIVE AND OBJECTIVE BOX
Neurology follow up note    GUNNER [id number]      Interval History:    Patient seen with sister still with numbness     MEDICATIONS    ALBUTerol/ipratropium for Nebulization 3 milliLiter(s) Nebulizer every 6 hours PRN  aspirin 325 milliGRAM(s) Oral daily  atorvastatin 20 milliGRAM(s) Oral at bedtime  buDESOnide   0.5 milliGRAM(s) Respule 0.5 milliGRAM(s) Inhalation two times a day  dextrose 40% Gel 15 Gram(s) Oral once PRN  dextrose 5%. 1000 milliLiter(s) IV Continuous <Continuous>  dextrose 50% Injectable 12.5 Gram(s) IV Push once  dextrose 50% Injectable 25 Gram(s) IV Push once  dextrose 50% Injectable 25 Gram(s) IV Push once  famotidine    Tablet 20 milliGRAM(s) Oral daily  glucagon  Injectable 1 milliGRAM(s) IntraMuscular once PRN  heparin  Injectable 5000 Unit(s) SubCutaneous every 8 hours  insulin glargine Injectable (LANTUS) 10 Unit(s) SubCutaneous every morning  insulin lispro (HumaLOG) corrective regimen sliding scale   SubCutaneous three times a day before meals  insulin lispro (HumaLOG) corrective regimen sliding scale   SubCutaneous at bedtime      Allergies    Avelox (Seizure)  shellfish (Swelling)    Intolerances            Vital Signs Last 24 Hrs  T(C): 36.5 (29 Nov 2018 07:29), Max: 36.8 (28 Nov 2018 23:33)  T(F): 97.7 (29 Nov 2018 07:29), Max: 98.2 (28 Nov 2018 23:33)  HR: 62 (29 Nov 2018 07:30) (59 - 76)  BP: 115/82 (29 Nov 2018 07:29) (109/66 - 126/80)  BP(mean): --  RR: 20 (29 Nov 2018 07:29) (16 - 20)  SpO2: 97% (29 Nov 2018 07:30) (95% - 98%)      REVIEW OF SYSTEMS:     Constitutional: No fever, chills, fatigue, weakness  Eyes: no eye pain, visual disturbances, or discharge  ENT:  No difficulty hearing, tinnitus, vertigo; No sinus or throat pain  Neck: No pain or stiffness  Respiratory: No cough, dyspnea, wheezing   Cardiovascular: No chest pain, palpitations,   Gastrointestinal: No abdominal or epigastric pain. No nausea, vomiting  No diarrhea or constipation.   Genitourinary: No dysuria, frequency, hematuria or incontinence  Neurological: No headaches, lightheadedness, vertigo, left sided numbness  Psychiatric: No depression, anxiety, mood swings or difficulty sleeping  Musculoskeletal: No joint pain or swelling; No muscle, back or extremity pain  Skin: No itching, burning, rashes or lesions   Lymph Nodes: No enlarged glands  Endocrine: No heat or cold intolerance; No hair loss   Allergy and Immunologic: No hives or eczema    On Neurological Examination:    The patient is awake and alert.    Extraocular movements were intact.    Pupils were equal, round, and reactive bilaterally 3 mm to 2 mm.    Speech was fluent.  Smile was symmetric.    Motor:  Bilateral upper and lower were 4+/5, left hand was 4-/5, does have decreased range of motion of left hand with arthritic changes.    Sensory:  The patient has decreased light touch to left face, left arm, and left leg,   There was no drift.  Finger-to-nose within normal limits.        Follow simple commands  Follow complex commands      GENERAL Exam: Nontoxic , No Acute Distress   	  HEENT:  normocephalic, atraumatic  		  LUNGS: Clear bilaterally    	  HEART: Normal S1S2   No murmur RRR        	  GI/ ABDOMEN:  Soft  Non tender    EXTREMITIES:   No Edema  No Clubbing  No Cyanosis     SKIN: Normal  No Ecchymosis             LABS:  CBC Full  -  ( 29 Nov 2018 06:24 )  WBC Count : 6.91 K/uL  Hemoglobin : 13.9 g/dL  Hematocrit : 43.1 %  Platelet Count - Automated : 359 K/uL  Mean Cell Volume : 85.2 fl  Mean Cell Hemoglobin : 27.5 pg  Mean Cell Hemoglobin Concentration : 32.3 gm/dL  Auto Neutrophil # : x  Auto Lymphocyte # : x  Auto Monocyte # : x  Auto Eosinophil # : x  Auto Basophil # : x  Auto Neutrophil % : x  Auto Lymphocyte % : x  Auto Monocyte % : x  Auto Eosinophil % : x  Auto Basophil % : x      11-29    139  |  104  |  14  ----------------------------<  240<H>  4.0   |  28  |  0.82    Ca    8.7      29 Nov 2018 06:24      Hemoglobin A1C: Hemoglobin A1C, Whole Blood: 10.4 % (11-28 @ 10:59)    Lipid Panel 11-28 @ 10:59  Total Cholesterol, Serum 214    Triglycerides 191      Vitamin B12   PT/INR - ( 27 Nov 2018 13:55 )   PT: 10.4 sec;   INR: 0.92 ratio               RADIOLOGY        ANALYSIS AND PLAN:  This is a 63-year-old with left-sided paresthesias, decreased left hand range of motion, history of diabetes, and spinal disc disease.  1.	For left-sided paresthesias, clinical impression is cerebrovascular accident  2.	I would recommend telemetry evaluation to rule out underlying arrhythmia.  3.	The patient's NIH stroke scale for sensory deficit overall would be roughly one.  4.	 MRI and MRA of the brain noted  5.	Echocardiogram pending   6.	Carotid doppler no sonographic evidence for hemodynamically significant carotid stenosis.  7.	For history of diabetes, would recommend strict control of blood sugars.  8.	Will start the patient on aspirin 325.  9.	Will start the patient on statin.  10.	For history of spinal disc disease, supportive therapy.  11.	Spoke with the patient's sister at the bedside 11/29/18  They understand the reasoning and thought process.  12.	no new events    Neurologic standpoint only cleared for discharge planning if echo normal     Thank you for the courtesy of this consultation.    Physical therapy evaluation as tolerated  OOB to chair/ambulation with assistance only if possible.    Greater than 45 minutes spent in direct patient care reviewing  the notes, lab data/ imaging , discussion with multidisciplinary team.

## 2018-11-29 NOTE — PROGRESS NOTE ADULT - SUBJECTIVE AND OBJECTIVE BOX
CAPILLARY BLOOD GLUCOSE      POCT Blood Glucose.: 239 mg/dL (29 Nov 2018 08:05)  POCT Blood Glucose.: 174 mg/dL (28 Nov 2018 21:13)  POCT Blood Glucose.: 255 mg/dL (28 Nov 2018 17:04)  POCT Blood Glucose.: 243 mg/dL (28 Nov 2018 11:39)      Vital Signs Last 24 Hrs  T(C): 36.5 (29 Nov 2018 07:29), Max: 36.8 (28 Nov 2018 23:33)  T(F): 97.7 (29 Nov 2018 07:29), Max: 98.2 (28 Nov 2018 23:33)  HR: 62 (29 Nov 2018 07:30) (59 - 76)  BP: 115/82 (29 Nov 2018 07:29) (109/66 - 126/80)  BP(mean): --  RR: 20 (29 Nov 2018 07:29) (16 - 20)  SpO2: 97% (29 Nov 2018 07:30) (95% - 98%)    General: WN/WD NAD  Respiratory: CTA B/L  CV: RRR, S1S2, no murmurs, rubs or gallops  Abdominal: Soft, NT, ND +BS, Last BM  Extremities: No edema, + peripheral pulses    Hemoglobin A1C, Whole Blood: 10.4 % (11-28 @ 10:59)  Hemoglobin A1C, Whole Blood: 10.4 % (11-27 @ 07:37)   11-29    139  |  104  |  14  ----------------------------<  240<H>  4.0   |  28  |  0.82    Ca    8.7      29 Nov 2018 06:24        atorvastatin 20 milliGRAM(s) Oral at bedtime  dextrose 40% Gel 15 Gram(s) Oral once PRN  dextrose 50% Injectable 12.5 Gram(s) IV Push once  dextrose 50% Injectable 25 Gram(s) IV Push once  dextrose 50% Injectable 25 Gram(s) IV Push once  glucagon  Injectable 1 milliGRAM(s) IntraMuscular once PRN  insulin glargine Injectable (LANTUS) 10 Unit(s) SubCutaneous every morning  insulin lispro (HumaLOG) corrective regimen sliding scale   SubCutaneous three times a day before meals  insulin lispro (HumaLOG) corrective regimen sliding scale   SubCutaneous at bedtime

## 2018-11-30 VITALS
RESPIRATION RATE: 18 BRPM | TEMPERATURE: 98 F | OXYGEN SATURATION: 94 % | SYSTOLIC BLOOD PRESSURE: 112 MMHG | DIASTOLIC BLOOD PRESSURE: 81 MMHG | HEART RATE: 81 BPM

## 2018-11-30 PROCEDURE — 96372 THER/PROPH/DIAG INJ SC/IM: CPT

## 2018-11-30 PROCEDURE — 85610 PROTHROMBIN TIME: CPT

## 2018-11-30 PROCEDURE — 70450 CT HEAD/BRAIN W/O DYE: CPT

## 2018-11-30 PROCEDURE — 84443 ASSAY THYROID STIM HORMONE: CPT

## 2018-11-30 PROCEDURE — 94640 AIRWAY INHALATION TREATMENT: CPT

## 2018-11-30 PROCEDURE — 97166 OT EVAL MOD COMPLEX 45 MIN: CPT

## 2018-11-30 PROCEDURE — 97116 GAIT TRAINING THERAPY: CPT

## 2018-11-30 PROCEDURE — 85027 COMPLETE CBC AUTOMATED: CPT

## 2018-11-30 PROCEDURE — 85652 RBC SED RATE AUTOMATED: CPT

## 2018-11-30 PROCEDURE — 93005 ELECTROCARDIOGRAM TRACING: CPT

## 2018-11-30 PROCEDURE — 82550 ASSAY OF CK (CPK): CPT

## 2018-11-30 PROCEDURE — 94664 DEMO&/EVAL PT USE INHALER: CPT

## 2018-11-30 PROCEDURE — 82746 ASSAY OF FOLIC ACID SERUM: CPT

## 2018-11-30 PROCEDURE — 70544 MR ANGIOGRAPHY HEAD W/O DYE: CPT

## 2018-11-30 PROCEDURE — 70551 MRI BRAIN STEM W/O DYE: CPT

## 2018-11-30 PROCEDURE — 36415 COLL VENOUS BLD VENIPUNCTURE: CPT

## 2018-11-30 PROCEDURE — 97161 PT EVAL LOW COMPLEX 20 MIN: CPT

## 2018-11-30 PROCEDURE — 71045 X-RAY EXAM CHEST 1 VIEW: CPT

## 2018-11-30 PROCEDURE — 93306 TTE W/DOPPLER COMPLETE: CPT

## 2018-11-30 PROCEDURE — 86431 RHEUMATOID FACTOR QUANT: CPT

## 2018-11-30 PROCEDURE — 84484 ASSAY OF TROPONIN QUANT: CPT

## 2018-11-30 PROCEDURE — 83735 ASSAY OF MAGNESIUM: CPT

## 2018-11-30 PROCEDURE — 80053 COMPREHEN METABOLIC PANEL: CPT

## 2018-11-30 PROCEDURE — 94760 N-INVAS EAR/PLS OXIMETRY 1: CPT

## 2018-11-30 PROCEDURE — 93880 EXTRACRANIAL BILAT STUDY: CPT

## 2018-11-30 PROCEDURE — 82607 VITAMIN B-12: CPT

## 2018-11-30 PROCEDURE — 83036 HEMOGLOBIN GLYCOSYLATED A1C: CPT

## 2018-11-30 PROCEDURE — 84550 ASSAY OF BLOOD/URIC ACID: CPT

## 2018-11-30 PROCEDURE — 99285 EMERGENCY DEPT VISIT HI MDM: CPT | Mod: 25

## 2018-11-30 PROCEDURE — 82962 GLUCOSE BLOOD TEST: CPT

## 2018-11-30 PROCEDURE — 97535 SELF CARE MNGMENT TRAINING: CPT

## 2018-11-30 PROCEDURE — 80048 BASIC METABOLIC PNL TOTAL CA: CPT

## 2018-11-30 PROCEDURE — 97530 THERAPEUTIC ACTIVITIES: CPT

## 2018-11-30 PROCEDURE — 99221 1ST HOSP IP/OBS SF/LOW 40: CPT

## 2018-11-30 PROCEDURE — 80061 LIPID PANEL: CPT

## 2018-11-30 RX ORDER — INSULIN GLARGINE 100 [IU]/ML
15 INJECTION, SOLUTION SUBCUTANEOUS EVERY MORNING
Qty: 0 | Refills: 0 | Status: DISCONTINUED | OUTPATIENT
Start: 2018-11-30 | End: 2018-11-30

## 2018-11-30 RX ORDER — INSULIN GLARGINE 100 [IU]/ML
15 INJECTION, SOLUTION SUBCUTANEOUS EVERY MORNING
Qty: 0 | Refills: 0 | Status: DISCONTINUED | OUTPATIENT
Start: 2018-12-01 | End: 2018-11-30

## 2018-11-30 RX ADMIN — Medication 0.5 MILLIGRAM(S): at 07:36

## 2018-11-30 RX ADMIN — Medication 4: at 08:27

## 2018-11-30 RX ADMIN — Medication 2: at 12:23

## 2018-11-30 RX ADMIN — INSULIN GLARGINE 10 UNIT(S): 100 INJECTION, SOLUTION SUBCUTANEOUS at 08:28

## 2018-11-30 RX ADMIN — FAMOTIDINE 20 MILLIGRAM(S): 10 INJECTION INTRAVENOUS at 12:23

## 2018-11-30 RX ADMIN — HEPARIN SODIUM 5000 UNIT(S): 5000 INJECTION INTRAVENOUS; SUBCUTANEOUS at 05:24

## 2018-11-30 RX ADMIN — METFORMIN HYDROCHLORIDE 1000 MILLIGRAM(S): 850 TABLET ORAL at 08:30

## 2018-11-30 RX ADMIN — Medication 325 MILLIGRAM(S): at 12:23

## 2018-11-30 NOTE — PROGRESS NOTE ADULT - PROBLEM SELECTOR PROBLEM 1
Diabetes mellitus
Diabetes mellitus type 2, uncontrolled
Diabetes mellitus type 2, uncontrolled
CVA (cerebral vascular accident)

## 2018-11-30 NOTE — PROGRESS NOTE ADULT - REASON FOR ADMISSION
Hyperglycemia and LUE weakness

## 2018-11-30 NOTE — PROGRESS NOTE ADULT - PROVIDER SPECIALTY LIST ADULT
Cardiology
Endocrinology
Hospitalist
Neurology
Cardiology

## 2018-11-30 NOTE — PROGRESS NOTE ADULT - NSHPATTENDINGPLANDISCUSS_GEN_ALL_CORE
med staff ,  Cheri and endocr consult .Rx for RW is left in a chart
med staff , Dr Mario , and 
MED STAFF ,  ,  , DR.PERLMAN , SPOKE TO THE SISTER AT BEDSIDE

## 2018-11-30 NOTE — PROGRESS NOTE ADULT - SUBJECTIVE AND OBJECTIVE BOX
PROGRESS NOTE  Patient is a 63y old  Female who presents with a chief complaint of Hyperglycemia and LUE weakness (30 Nov 2018 09:32)  Chart and available morning labs /imaging are reviewed electronically , urgent issues addressed . More information  is being added upon completion of rounds , when more information is collected and management discussed with consultants , medical staff and social service/case management on the floor   OVERNIGHT  No new issues reported by medical staff . All above noted Patient is resting in a bed comfortably  .No distress noted   No complains Seen with Dr Perlman at bedside ,he advised the patient to stop by in his office to get supplies after d/c from the hospital   HPI:  63 y WFemale PMH: copd/asthma, diabetes, OA ,hx of knee ,back and shoulder sx presents with episode of numbness, tingling,  to left side of face, arm and left leg,  states she checked her finger stick it was over 400,  came to ED.  states she did not have any pain, no slurred speech.  states no recent illness , no fever, no cough, no chest pain, no sob ,admits arthralgia and hand joints deformities and swelling of digits of left hand for a while  ,but didnt seek for medical help or rheumatology eval  .CT brain reveled old infarcts ,no acute pathology .Neurology evaluation is requested Seen by cardiologist for BP control .Admit to telemetry unit for monitoring, send 3 sets of cardiac enzymes to rule out coronary event ,obtain ECHO to evaluate LVEF ,cardiology consult, continue current managmnet,O2 supply, anticoagulation plan as per cardiology consult .Palliative care consult requested ,to discuss advance directives and complete MOLST. Patient admits sometimes  using cane for ambulation .Her sister is at bedside and is very upset ,that "patient doesn't take care of herself and doesn't go to see doctors" (27 Nov 2018 07:06)  PAST MEDICAL & SURGICAL HISTORY:  Asthma  Diabetes mellitus  H/O shoulder surgery  History of back surgery  S/P cholecystectomy  H/O knee surgery  MEDICATIONS  (STANDING):  aspirin 325 milliGRAM(s) Oral daily  atorvastatin 20 milliGRAM(s) Oral at bedtime  buDESOnide   0.5 milliGRAM(s) Respule 0.5 milliGRAM(s) Inhalation two times a day  dextrose 5%. 1000 milliLiter(s) (50 mL/Hr) IV Continuous <Continuous>  dextrose 50% Injectable 12.5 Gram(s) IV Push once  dextrose 50% Injectable 25 Gram(s) IV Push once  dextrose 50% Injectable 25 Gram(s) IV Push once  famotidine    Tablet 20 milliGRAM(s) Oral daily  heparin  Injectable 5000 Unit(s) SubCutaneous every 8 hours  insulin lispro (HumaLOG) corrective regimen sliding scale   SubCutaneous three times a day before meals  insulin lispro (HumaLOG) corrective regimen sliding scale   SubCutaneous at bedtime  metFORMIN 1000 milliGRAM(s) Oral two times a day  MEDICATIONS  (PRN):  ALBUTerol/ipratropium for Nebulization 3 milliLiter(s) Nebulizer every 6 hours PRN Shortness of Breath and/or Wheezing  dextrose 40% Gel 15 Gram(s) Oral once PRN Blood Glucose LESS THAN 70 milliGRAM(s)/deciLiter  glucagon  Injectable 1 milliGRAM(s) IntraMuscular once PRN Glucose <70 milliGRAM(s)/deciLiter  OBJECTIVE  T(C): 36.6 (11-30-18 @ 07:40), Max: 36.8 (11-29-18 @ 19:56)  HR: 75 (11-30-18 @ 07:40) (54 - 75)  BP: 108/67 (11-30-18 @ 07:40) (92/89 - 129/79)  RR: 17 (11-30-18 @ 07:40) (17 - 19)  SpO2: 94% (11-30-18 @ 07:40) (94% - 98%)  Wt(kg): --  I&O's Summary  29 Nov 2018 07:01  -  30 Nov 2018 07:00  --------------------------------------------------------  IN: 360 mL / OUT: 0 mL / NET: 360 mL  ROS -negative   Physical Exam:  · Constitutional	detailed exam	  · Constitutional Details	well-developed; well-groomed; well-nourished; no distress	  · Eyes	detailed exam	  · Eyes Details	PERRL; EOMI; conjunctiva clear	  · ENMT	No oral lesions; no gross abnormalities	  · Neck	detailed exam 	  · Neck Details	normal; supple; no JVD; normal thyroid gland	  · Breasts	detailed exam	  · Breasts Details	normal shape	  · Back	detailed exam 	  · Back Details	cannot attain upright position	  · Respiratory	detailed exam	  · Respiratory Details	normal; airway patent; breath sounds equal; good air movement; respirations non-labored; clear to auscultation bilaterally; no chest wall tenderness	  · Cardiovascular	detailed exam	  · Cardiovascular Details	regular rate and rhythm 	  · Cardiovascular Details	positive S1; positive S2	  · Gastrointestinal	detailed exam	  · GI Normal	normal; soft; nontender; no distention; no masses palpable; bowel sounds normal	  · Genitourinary	not examined 	  · Rectal	not examined 	  · Extremities	detailed exam 	  · Extremities Details	normal; no clubbing; no cyanosis; no pedal edema;LUE weakness -much improvement 	  · Vascular	Equal and normal pulses (carotid, femoral, dorsalis pedis) 	  · Neurological	detailed exam	  · Neurological Details	alert and oriented x 3; responds to pain; responds to verbal commands; LUE WEAKNESS is resolving 	  · Skin	No lesions; no rash	  · Lymph Nodes	No lymphadedenopathy	  · Musculoskeletal	detailed exam	  · Musculoskeletal Details	no joint erythema; no joint warmth; no calf tenderness; decreased ROM; decreased ROM due to pain; joint swelling; L HAND/ARM  WEAKNESS much improved 	  · Psychiatric	Affect and characteristics of appearance, verbalizations, behaviors are appropriate	    LABS:                        13.9   6.91  )-----------( 359      ( 29 Nov 2018 06:24 )             43.1   11-29  139  |  104  |  14  ----------------------------<  240<H>  4.0   |  28  |  0.82  Ca    8.7      29 Nov 2018 06:24  CAPILLARY BLOOD GLUCOSE  POCT Blood Glucose.: 201 mg/dL (30 Nov 2018 07:52)  POCT Blood Glucose.: 171 mg/dL (29 Nov 2018 21:34)  POCT Blood Glucose.: 178 mg/dL (29 Nov 2018 17:08)  POCT Blood Glucose.: 197 mg/dL (29 Nov 2018 12:37)  POCT Blood Glucose.: 190 mg/dL (29 Nov 2018 11:33)  RADIOLOGY & ADDITIONAL TESTS:< from: MR Angio Head No Cont (11.27.18 @ 14:30) >  EXAM:  MR ANGIO BRAIN                          EXAM:  MR BRAIN                            PROCEDURE DATE:  11/27/2018          INTERPRETATION:  .    CLINICAL INFORMATION: Left-sided numbness.    TECHNIQUE: Multiplanar multi sequential MRI examination of the brain was   performed without the administration of IV gadolinium. MRA images through   the [place] [facility] were obtained using a combination of 2-D and 3-D   time-of-flight acquisition. The data was then reformatted into a   volumetric data set and reviewed as rotational MIP images.     COMPARISON: Comparison is made to the most recent prior CT examination of   the head from 11/26/2018. Comparison is also made to a prior brain MRI   examination and MRA examination of the head from 12/21/2011.    FINDINGS:    MRI Brain: There is an acute lacunar infarct within the right thalamus   with associated T2/FLAIR hyperintense signal compatible with cytotoxic   edema.    There is a very small right frontal chronic transcortical infarct (series   8, images 18-19).    Multiple additional patchy confluent nonspecific T2/FLAIR hyperintense   signal changes are noted throughout the bihemispheric white matter   without associated mass effect or restricted diffusion.    Ventricular size and configuration is unremarkable. No abnormal   extra-axial fluid collections are noted. Flow-voids are noted throughout   the major intracranial vessels, on the T2 weighted images, consistent   with their patency. The sella turcica and posterior fossa are   unremarkable.    The paranasal sinuses and mastoid air cells are clear. Calvarial signal   is normal. The orbits appear within normal limits.    MRA [place] [facility] [place]: The bilateral intracranial internal carotid,   anterior, and middle cerebral arteries appear unremarkable.    The anterior and bilateral posterior communicating arteries are notable.    The bilateral intradural vertebral arteries, vertebrobasilar junction,   basilar artery, and basilar tip appear unremarkable as well as the right   posterior cerebral artery. There is an unchanged 2 mm infundibulum off a   left medial posterior choroidal arterial branch (series 6, image 78).   Otherwise, the left posterior cerebral artery appears unremarkable.    IMPRESSION:    MRI brain: Acute lacunar infarct involving the right thalamus with   associated cytotoxic edema.    No acute intracranial hemorrhage.    Multiple additional nonspecific abnormal white matter foci of T2/FLAIR   prolongation statistically favoring microvascular disease.    MRA head: No large vessel occlusion or major stenosis.    < end of copied text >  Patient was seen and examined on a day of discharge . Plan of care , discharge medications and recommendations discussed with consultants and clearance for discharge obtained .Social service , case managment  and medical staff are aware of plan. Family is notified. Discharge summary  is  prepared electronically    reviewed elctronically  ASSESSMENT/PLAN:

## 2018-11-30 NOTE — PROGRESS NOTE ADULT - SUBJECTIVE AND OBJECTIVE BOX
CAPILLARY BLOOD GLUCOSE      POCT Blood Glucose.: 201 mg/dL (30 Nov 2018 07:52)  POCT Blood Glucose.: 171 mg/dL (29 Nov 2018 21:34)  POCT Blood Glucose.: 178 mg/dL (29 Nov 2018 17:08)  POCT Blood Glucose.: 197 mg/dL (29 Nov 2018 12:37)  POCT Blood Glucose.: 190 mg/dL (29 Nov 2018 11:33)      Vital Signs Last 24 Hrs  T(C): 36.6 (30 Nov 2018 07:40), Max: 36.8 (29 Nov 2018 19:56)  T(F): 97.9 (30 Nov 2018 07:40), Max: 98.3 (29 Nov 2018 19:56)  HR: 75 (30 Nov 2018 07:40) (54 - 75)  BP: 108/67 (30 Nov 2018 07:40) (92/89 - 129/79)  BP(mean): --  RR: 17 (30 Nov 2018 07:40) (17 - 19)  SpO2: 94% (30 Nov 2018 07:40) (94% - 98%)    General: WN/WD NAD  Respiratory: CTA B/L  CV: RRR, S1S2, no murmurs, rubs or gallops  Abdominal: Soft, NT, ND +BS, Last BM  Extremities: No edema, + peripheral pulses    Hemoglobin A1C, Whole Blood: 10.4 % (11-28 @ 10:59)   11-29    139  |  104  |  14  ----------------------------<  240<H>  4.0   |  28  |  0.82    Ca    8.7      29 Nov 2018 06:24        atorvastatin 20 milliGRAM(s) Oral at bedtime  dextrose 40% Gel 15 Gram(s) Oral once PRN  dextrose 50% Injectable 12.5 Gram(s) IV Push once  dextrose 50% Injectable 25 Gram(s) IV Push once  dextrose 50% Injectable 25 Gram(s) IV Push once  glucagon  Injectable 1 milliGRAM(s) IntraMuscular once PRN  insulin glargine Injectable (LANTUS) 10 Unit(s) SubCutaneous every morning  insulin lispro (HumaLOG) corrective regimen sliding scale   SubCutaneous three times a day before meals  insulin lispro (HumaLOG) corrective regimen sliding scale   SubCutaneous at bedtime  metFORMIN 1000 milliGRAM(s) Oral two times a day

## 2018-11-30 NOTE — PROGRESS NOTE ADULT - PROBLEM SELECTOR PLAN 1
add lantus 10 units qam  change mod dose humalog scale coverage  cont cons cho diet  bg goal 100-180 in hosp setting
cont lantus 10 units qam  cont mod dose humalog scale coverage  restart metformin 1gm bid  cont cons cho diet  pt to f/u in office for insulin pen teaching/samples
increase lantus 15 units daily  cont humalog scale coverage  cont metformin 1gm bid  cont cons cho diet  goal bg 100-180 in hosp setting  pt to f/u in office for insulin supplies/teaching
ACUTE R THALAMIC INFARCT FOUND ON MRI -Continue current management and medications Case d/w Dr Mario

## 2018-11-30 NOTE — PROGRESS NOTE ADULT - ATTENDING COMMENTS
OOBTC/PT .Advance care planning was d/w the family. Pain is accessed and addresed. Pt was screened for signs of clinical depression .Appropriate referral made. Risk of falls accessed.Fall prevention d/w family . Pt is screened for tobacco and etoh,counseling was done for etoh and tobacco cessation .Use of narcotic pain meds was discussed .Pt is advised to use narcotic meds  wisely and to avoid overusing them. Plan of care d/w family and all questions answered to best of my knowledge
63 y emale PMH: copd/asthma, diabetes, OA ,hx of knee ,back and shoulder sx presents with episode of numbness, tingling,  to left side of face, arm and left leg,  states she checked her finger stick it was over 400,  came to ED.  states she did not have any pain, no slurred speech.  states no recent illness , no fever, no cough, no chest pain, no sob ,admits arthralgia and hand joints deformities and swelling of digits of left hand for a while  ,but didnt seek for medical help or rheumatology eval  .CT brain reveled old infarcts ,no acute pathology .Neurology evaluation is requested Seen by cardiologist for BP control .Admit to telemetry unit for monitoring, send 3 sets of cardiac enzymes to rule out coronary event ,obtain ECHO to evaluate LVEF ,cardiology consult, continue current managmnet,O2 supply, anticoagulation plan as per cardiology consult .Palliative care consult requested ,to discuss advance directives and complete MOLST. Patient admits sometimes  using cane for ambulation .Her sister is at bedside and is very upset ,that "patient doesn't take care of herself and doesn't go to see doctors"

## 2018-11-30 NOTE — PROGRESS NOTE ADULT - SUBJECTIVE AND OBJECTIVE BOX
Neurology follow up note    GUNNER [id number]      Interval History:    Patient feels ok no new complaints seen with family still with numbness still with numbness     MEDICATIONS    ALBUTerol/ipratropium for Nebulization 3 milliLiter(s) Nebulizer every 6 hours PRN  aspirin 325 milliGRAM(s) Oral daily  atorvastatin 20 milliGRAM(s) Oral at bedtime  buDESOnide   0.5 milliGRAM(s) Respule 0.5 milliGRAM(s) Inhalation two times a day  dextrose 40% Gel 15 Gram(s) Oral once PRN  dextrose 5%. 1000 milliLiter(s) IV Continuous <Continuous>  dextrose 50% Injectable 12.5 Gram(s) IV Push once  dextrose 50% Injectable 25 Gram(s) IV Push once  dextrose 50% Injectable 25 Gram(s) IV Push once  famotidine    Tablet 20 milliGRAM(s) Oral daily  glucagon  Injectable 1 milliGRAM(s) IntraMuscular once PRN  heparin  Injectable 5000 Unit(s) SubCutaneous every 8 hours  insulin lispro (HumaLOG) corrective regimen sliding scale   SubCutaneous three times a day before meals  insulin lispro (HumaLOG) corrective regimen sliding scale   SubCutaneous at bedtime  metFORMIN 1000 milliGRAM(s) Oral two times a day      Allergies    Avelox (Seizure)  shellfish (Swelling)    Intolerances            Vital Signs Last 24 Hrs  T(C): 36.6 (30 Nov 2018 07:40), Max: 36.8 (29 Nov 2018 19:56)  T(F): 97.9 (30 Nov 2018 07:40), Max: 98.3 (29 Nov 2018 19:56)  HR: 75 (30 Nov 2018 07:40) (54 - 75)  BP: 108/67 (30 Nov 2018 07:40) (92/89 - 129/79)  BP(mean): --  RR: 17 (30 Nov 2018 07:40) (17 - 19)  SpO2: 94% (30 Nov 2018 07:40) (94% - 98%)    REVIEW OF SYSTEMS:     Constitutional: No fever, chills, fatigue, weakness  Eyes: no eye pain, visual disturbances, or discharge  ENT:  No difficulty hearing, tinnitus, vertigo; No sinus or throat pain  Neck: No pain or stiffness  Respiratory: No cough, dyspnea, wheezing   Cardiovascular: No chest pain, palpitations,   Gastrointestinal: No abdominal or epigastric pain. No nausea, vomiting  No diarrhea or constipation.   Genitourinary: No dysuria, frequency, hematuria or incontinence  Neurological: No headaches, lightheadedness, vertigo, left sided numbness  Psychiatric: No depression, anxiety, mood swings or difficulty sleeping  Musculoskeletal: No joint pain or swelling; No muscle, back or extremity pain  Skin: No itching, burning, rashes or lesions   Lymph Nodes: No enlarged glands  Endocrine: No heat or cold intolerance; No hair loss   Allergy and Immunologic: No hives or eczema    On Neurological Examination:    The patient is awake and alert.    Extraocular movements were intact.    Pupils were equal, round, and reactive bilaterally 3 mm to 2 mm.    Speech was fluent.  Smile was symmetric.    Motor:  Bilateral upper and lower were 4+/5, left hand was 4-/5, does have decreased range of motion of left hand with arthritic changes.    Sensory:  The patient has decreased light touch to left face, left arm, and left leg,   There was no drift.  Finger-to-nose within normal limits.        Follow simple commands  Follow complex commands      GENERAL Exam: Nontoxic , No Acute Distress   	  HEENT:  normocephalic, atraumatic  		  LUNGS: Clear bilaterally    	  HEART: Normal S1S2   No murmur RRR        	  GI/ ABDOMEN:  Soft  Non tender    EXTREMITIES:   No Edema  No Clubbing  No Cyanosis     SKIN: Normal  No Ecchymosis                  LABS:  CBC Full  -  ( 29 Nov 2018 06:24 )  WBC Count : 6.91 K/uL  Hemoglobin : 13.9 g/dL  Hematocrit : 43.1 %  Platelet Count - Automated : 359 K/uL  Mean Cell Volume : 85.2 fl  Mean Cell Hemoglobin : 27.5 pg  Mean Cell Hemoglobin Concentration : 32.3 gm/dL  Auto Neutrophil # : x  Auto Lymphocyte # : x  Auto Monocyte # : x  Auto Eosinophil # : x  Auto Basophil # : x  Auto Neutrophil % : x  Auto Lymphocyte % : x  Auto Monocyte % : x  Auto Eosinophil % : x  Auto Basophil % : x      11-29    139  |  104  |  14  ----------------------------<  240<H>  4.0   |  28  |  0.82    Ca    8.7      29 Nov 2018 06:24      Hemoglobin A1C:       Vitamin B12         RADIOLOGY      ANALYSIS AND PLAN:  This is a 63-year-old with left-sided paresthesias, decreased left hand range of motion, history of diabetes, and spinal disc disease.  1.	For left-sided paresthesias, clinical impression is cerebrovascular accident  2.	I would recommend telemetry evaluation to rule out underlying arrhythmia.  3.	The patient's NIH stroke scale for sensory deficit overall would be roughly one.  4.	 MRI and MRA of the brain noted  5.	Carotid doppler no sonographic evidence for hemodynamically significant carotid stenosis.  6.	For history of diabetes, would recommend strict control of blood sugars.  7.	Will start the patient on aspirin 325.  8.	Will start the patient on statin.  9.	For history of spinal disc disease, supportive therapy.  10.	Spoke with the patient's sister at the bedside 11/30/18  They understand the reasoning and thought process.  11.	no new events    Neurologic standpoint only cleared for discharge planning if echo normal     Thank you for the courtesy of this consultation.    Physical therapy evaluation as tolerated  OOB to chair/ambulation with assistance only if possible.    Greater than 40 minutes spent in direct patient care reviewing  the notes, lab data/ imaging , discussion with multidisciplinary team.

## 2019-01-03 NOTE — PATIENT PROFILE ADULT - OVER THE PAST TWO WEEKS, HAVE YOU FELT LITTLE INTEREST OR PLEASURE IN DOING THINGS?
no Cheek Interpolation Flap Text: A decision was made to reconstruct the defect utilizing an interpolation axial flap and a staged reconstruction.  A telfa template was made of the defect.  This telfa template was then used to outline the Cheek Interpolation flap.  The donor area for the pedicle flap was then injected with anesthesia.  The flap was excised through the skin and subcutaneous tissue down to the layer of the underlying musculature.  The interpolation flap was carefully excised within this deep plane to maintain its blood supply.  The edges of the donor site were undermined.   The donor site was closed in a primary fashion.  The pedicle was then rotated into position and sutured.  Once the tube was sutured into place, adequate blood supply was confirmed with blanching and refill.  The pedicle was then wrapped with xeroform gauze and dressed appropriately with a telfa and gauze bandage to ensure continued blood supply and protect the attached pedicle.

## 2019-02-03 NOTE — GOALS OF CARE CONVERSATION - PERSONAL ADVANCE DIRECTIVE - CONVERSATION DETAILS
met pt w sister, Pat present. pt has no hcp, completed hcp: spouse is hcp, alt hcp: daughter, Francine & sister Pat.  reviewed conversation w family of her resuscitation wishes. at present pt wants resuscitation. contact # given No

## 2019-03-18 NOTE — PATIENT PROFILE ADULT - NSPROMUTANXFEARFT_GEN_A_NUR
3/18/2019    To: Nery Oviedo MD  [address] [facility] [address]    Re:  Sacha Ivy    YOB: 2016    MRN: [id number]    Dear Colleague,     It was my pleasure to see Sacha on 3/18/2019.  In summary,  Sacha Ivy is a 3 year old male who presents with:     Amblyopia of left eye  Anisometropia    Best corrected visual acuity of 20/25+/- left eye.   - Updated glasses prescription provided. For Sacha's vision and development, it is critical that he wear his glasses FULL TIME (100% of waking hours).     - Continue to patch the right eye 2 hours per day every day.      Thank you for the opportunity to care for Sacha. I have asked him to Return in about 3 months (around 6/18/2019) for Orthoptics clinic, Vision & alignment.  Until then, please do not hesitate to contact me or my clinic with any questions or concerns.          Warm regards,          Katarzyna Lewis MD                 Pediatric Ophthalmology & Strabismus        Department of Ophthalmology & Visual Neurosciences        [facility]   CC:  Guardian of Fredis Ivy    
none

## 2019-08-16 ENCOUNTER — EMERGENCY (EMERGENCY)
Facility: HOSPITAL | Age: 64
LOS: 1 days | Discharge: HOME | End: 2019-08-16
Attending: EMERGENCY MEDICINE | Admitting: EMERGENCY MEDICINE
Payer: MEDICARE

## 2019-08-16 VITALS
SYSTOLIC BLOOD PRESSURE: 146 MMHG | HEART RATE: 85 BPM | HEIGHT: 67 IN | WEIGHT: 145.95 LBS | RESPIRATION RATE: 18 BRPM | DIASTOLIC BLOOD PRESSURE: 79 MMHG | TEMPERATURE: 99 F | OXYGEN SATURATION: 96 %

## 2019-08-16 DIAGNOSIS — Z98.89 OTHER SPECIFIED POSTPROCEDURAL STATES: Chronic | ICD-10-CM

## 2019-08-16 DIAGNOSIS — Z90.49 ACQUIRED ABSENCE OF OTHER SPECIFIED PARTS OF DIGESTIVE TRACT: Chronic | ICD-10-CM

## 2019-08-16 LAB
ALBUMIN SERPL ELPH-MCNC: 3.7 G/DL — SIGNIFICANT CHANGE UP (ref 3.3–5)
ALP SERPL-CCNC: 74 U/L — SIGNIFICANT CHANGE UP (ref 40–120)
ALT FLD-CCNC: 23 U/L — SIGNIFICANT CHANGE UP (ref 12–78)
ANION GAP SERPL CALC-SCNC: 8 MMOL/L — SIGNIFICANT CHANGE UP (ref 5–17)
APPEARANCE UR: ABNORMAL
AST SERPL-CCNC: 12 U/L — LOW (ref 15–37)
BASOPHILS # BLD AUTO: 0.1 K/UL — SIGNIFICANT CHANGE UP (ref 0–0.2)
BASOPHILS NFR BLD AUTO: 1.2 % — SIGNIFICANT CHANGE UP (ref 0–2)
BILIRUB SERPL-MCNC: 0.2 MG/DL — SIGNIFICANT CHANGE UP (ref 0.2–1.2)
BILIRUB UR-MCNC: NEGATIVE — SIGNIFICANT CHANGE UP
BUN SERPL-MCNC: 18 MG/DL — SIGNIFICANT CHANGE UP (ref 7–23)
CALCIUM SERPL-MCNC: 9.5 MG/DL — SIGNIFICANT CHANGE UP (ref 8.5–10.1)
CHLORIDE SERPL-SCNC: 107 MMOL/L — SIGNIFICANT CHANGE UP (ref 96–108)
CO2 SERPL-SCNC: 30 MMOL/L — SIGNIFICANT CHANGE UP (ref 22–31)
COLOR SPEC: YELLOW — SIGNIFICANT CHANGE UP
CREAT SERPL-MCNC: 0.92 MG/DL — SIGNIFICANT CHANGE UP (ref 0.5–1.3)
DIFF PNL FLD: ABNORMAL
EOSINOPHIL # BLD AUTO: 0.7 K/UL — HIGH (ref 0–0.5)
EOSINOPHIL NFR BLD AUTO: 8.4 % — HIGH (ref 0–6)
GLUCOSE SERPL-MCNC: 113 MG/DL — HIGH (ref 70–99)
GLUCOSE UR QL: NEGATIVE — SIGNIFICANT CHANGE UP
HCT VFR BLD CALC: 40.2 % — SIGNIFICANT CHANGE UP (ref 34.5–45)
HGB BLD-MCNC: 12.8 G/DL — SIGNIFICANT CHANGE UP (ref 11.5–15.5)
IMM GRANULOCYTES NFR BLD AUTO: 0.2 % — SIGNIFICANT CHANGE UP (ref 0–1.5)
KETONES UR-MCNC: NEGATIVE — SIGNIFICANT CHANGE UP
LACTATE SERPL-SCNC: 1.2 MMOL/L — SIGNIFICANT CHANGE UP (ref 0.7–2)
LEUKOCYTE ESTERASE UR-ACNC: ABNORMAL
LYMPHOCYTES # BLD AUTO: 3.29 K/UL — SIGNIFICANT CHANGE UP (ref 1–3.3)
LYMPHOCYTES # BLD AUTO: 39.5 % — SIGNIFICANT CHANGE UP (ref 13–44)
MCHC RBC-ENTMCNC: 27.6 PG — SIGNIFICANT CHANGE UP (ref 27–34)
MCHC RBC-ENTMCNC: 31.8 GM/DL — LOW (ref 32–36)
MCV RBC AUTO: 86.6 FL — SIGNIFICANT CHANGE UP (ref 80–100)
MONOCYTES # BLD AUTO: 0.74 K/UL — SIGNIFICANT CHANGE UP (ref 0–0.9)
MONOCYTES NFR BLD AUTO: 8.9 % — SIGNIFICANT CHANGE UP (ref 2–14)
NEUTROPHILS # BLD AUTO: 3.47 K/UL — SIGNIFICANT CHANGE UP (ref 1.8–7.4)
NEUTROPHILS NFR BLD AUTO: 41.8 % — LOW (ref 43–77)
NITRITE UR-MCNC: NEGATIVE — SIGNIFICANT CHANGE UP
NRBC # BLD: 0 /100 WBCS — SIGNIFICANT CHANGE UP (ref 0–0)
PH UR: 7 — SIGNIFICANT CHANGE UP (ref 5–8)
PLATELET # BLD AUTO: 308 K/UL — SIGNIFICANT CHANGE UP (ref 150–400)
POTASSIUM SERPL-MCNC: 4.3 MMOL/L — SIGNIFICANT CHANGE UP (ref 3.5–5.3)
POTASSIUM SERPL-SCNC: 4.3 MMOL/L — SIGNIFICANT CHANGE UP (ref 3.5–5.3)
PROT SERPL-MCNC: 6.9 G/DL — SIGNIFICANT CHANGE UP (ref 6–8.3)
PROT UR-MCNC: NEGATIVE — SIGNIFICANT CHANGE UP
RBC # BLD: 4.64 M/UL — SIGNIFICANT CHANGE UP (ref 3.8–5.2)
RBC # FLD: 12.9 % — SIGNIFICANT CHANGE UP (ref 10.3–14.5)
SODIUM SERPL-SCNC: 145 MMOL/L — SIGNIFICANT CHANGE UP (ref 135–145)
SP GR SPEC: 1.01 — SIGNIFICANT CHANGE UP (ref 1.01–1.02)
UROBILINOGEN FLD QL: NEGATIVE — SIGNIFICANT CHANGE UP
WBC # BLD: 8.32 K/UL — SIGNIFICANT CHANGE UP (ref 3.8–10.5)
WBC # FLD AUTO: 8.32 K/UL — SIGNIFICANT CHANGE UP (ref 3.8–10.5)

## 2019-08-16 PROCEDURE — 99285 EMERGENCY DEPT VISIT HI MDM: CPT

## 2019-08-16 PROCEDURE — 74176 CT ABD & PELVIS W/O CONTRAST: CPT | Mod: 26

## 2019-08-16 RX ORDER — SODIUM CHLORIDE 9 MG/ML
1000 INJECTION INTRAMUSCULAR; INTRAVENOUS; SUBCUTANEOUS ONCE
Refills: 0 | Status: COMPLETED | OUTPATIENT
Start: 2019-08-16 | End: 2019-08-16

## 2019-08-16 RX ORDER — ONDANSETRON 8 MG/1
4 TABLET, FILM COATED ORAL ONCE
Refills: 0 | Status: COMPLETED | OUTPATIENT
Start: 2019-08-16 | End: 2019-08-16

## 2019-08-16 RX ORDER — MORPHINE SULFATE 50 MG/1
4 CAPSULE, EXTENDED RELEASE ORAL ONCE
Refills: 0 | Status: DISCONTINUED | OUTPATIENT
Start: 2019-08-16 | End: 2019-08-16

## 2019-08-16 RX ORDER — CEFTRIAXONE 500 MG/1
1000 INJECTION, POWDER, FOR SOLUTION INTRAMUSCULAR; INTRAVENOUS ONCE
Refills: 0 | Status: COMPLETED | OUTPATIENT
Start: 2019-08-16 | End: 2019-08-16

## 2019-08-16 RX ADMIN — SODIUM CHLORIDE 1000 MILLILITER(S): 9 INJECTION INTRAMUSCULAR; INTRAVENOUS; SUBCUTANEOUS at 21:43

## 2019-08-16 RX ADMIN — MORPHINE SULFATE 4 MILLIGRAM(S): 50 CAPSULE, EXTENDED RELEASE ORAL at 21:00

## 2019-08-16 RX ADMIN — MORPHINE SULFATE 4 MILLIGRAM(S): 50 CAPSULE, EXTENDED RELEASE ORAL at 20:44

## 2019-08-16 RX ADMIN — SODIUM CHLORIDE 1000 MILLILITER(S): 9 INJECTION INTRAMUSCULAR; INTRAVENOUS; SUBCUTANEOUS at 20:43

## 2019-08-16 RX ADMIN — ONDANSETRON 4 MILLIGRAM(S): 8 TABLET, FILM COATED ORAL at 20:43

## 2019-08-16 NOTE — ED PROVIDER NOTE - CARE PROVIDER_API CALL
Silvina Richey)  Neurology  [address] [place] [address]  Phone: [phone]  Fax: [phone]  Follow Up Time: 1-3 Days    Rosio Lopez)  Neurology  [address], [address]  [place] [address]  Phone: [phone]  Fax: [phone]  Follow Up Time: 1-3 Days

## 2019-08-16 NOTE — ED PROVIDER NOTE - OBJECTIVE STATEMENT
63 y/o female presents to ED c/o left flank pain x 2 months. +dysuria and nausea x 2 days. Denies any other complaints. States she otherwise feels good. Denies vomiting, f/c, chest pain, sob, numbness, tingling, diarrhea. LBM today.

## 2019-08-16 NOTE — ED ADULT TRIAGE NOTE - CHIEF COMPLAINT QUOTE
Pt. complaining of left flank and back pain x 2 months. Pt. reported burning with urination and nausea x 2 days. Denied fevers, vomiting, shortness of breath or chest pain.

## 2019-08-16 NOTE — ED ADULT NURSE NOTE - OBJECTIVE STATEMENT
Pt received alert and oriented x 4 c/o flank pain. Pt reports 7/10 sharp pain to left flank radiating to back pain x 2 months. Pt. reported burning with urination and nausea x 2 days. Denied fevers, vomiting, shortness of breath or chest pain. Pt reports taking tylenol and advil, no relief.

## 2019-08-16 NOTE — ED PROVIDER NOTE - PROVIDER TOKENS
PROVIDER:[TOKEN:[[id number]],FOLLOWUP:[1-3 Days]],PROVIDER:[TOKEN:[[id number]],FOLLOWUP:[1-3 Days]]

## 2019-08-16 NOTE — ED PROVIDER NOTE - ATTENDING CONTRIBUTION TO CARE
64y F hx CVA Nov 2018, DM, back surgery here with c/o L sided back and upper abd pain which she describes as feeling like a gas bubble. 64y F hx CVA Nov 2018, DM, back surgery here with c/o L sided back/flank and upper abd pain which she describes as feeling like a gas bubble.  Pain has been there for at least 2 months. Also endorses many other chronic complaints since her stroke including numbness to L side of body/face, "ice cold" fingers and face. Pain in her hands and finger tips that "feels like her skin is inside out." States has been following with OP neuro and was on gabapentin 100mg TID without improvement. Pt states she does have residual weakness as well but does not use assistive device and drives a car. She is here today because in addition to the aforementioned complaints pt notes dysuria and nausea x2 days. No F/C, vomiting. On exam well appearing, no midline spinal ttp, no step off or deformity,  no skin rashes, no CVAT. Will eval for UTI/pyelo. Check labs, UA, CTAP. Advised pt that we likely will not get to bottom of her chronic complaints sp stroke but that I am happy to give her a referral to another neuro as she states she is displeased with hers.

## 2019-08-16 NOTE — ED ADULT NURSE NOTE - NSIMPLEMENTINTERV_GEN_ALL_ED
Implemented All Universal Safety Interventions:  [place] to call system. Call bell, personal items and telephone within reach. Instruct patient to call for assistance. Room bathroom lighting operational. Non-slip footwear when patient is off stretcher. Physically safe environment: no spills, clutter or unnecessary equipment. Stretcher in lowest position, wheels locked, appropriate side rails in place.

## 2019-08-17 VITALS
SYSTOLIC BLOOD PRESSURE: 118 MMHG | TEMPERATURE: 98 F | HEART RATE: 62 BPM | DIASTOLIC BLOOD PRESSURE: 71 MMHG | OXYGEN SATURATION: 96 % | RESPIRATION RATE: 18 BRPM

## 2019-08-17 PROCEDURE — 80053 COMPREHEN METABOLIC PANEL: CPT

## 2019-08-17 PROCEDURE — 96361 HYDRATE IV INFUSION ADD-ON: CPT

## 2019-08-17 PROCEDURE — 81001 URINALYSIS AUTO W/SCOPE: CPT

## 2019-08-17 PROCEDURE — 99284 EMERGENCY DEPT VISIT MOD MDM: CPT | Mod: 25

## 2019-08-17 PROCEDURE — 87040 BLOOD CULTURE FOR BACTERIA: CPT

## 2019-08-17 PROCEDURE — 87086 URINE CULTURE/COLONY COUNT: CPT

## 2019-08-17 PROCEDURE — 83605 ASSAY OF LACTIC ACID: CPT

## 2019-08-17 PROCEDURE — 85027 COMPLETE CBC AUTOMATED: CPT

## 2019-08-17 PROCEDURE — 96375 TX/PRO/DX INJ NEW DRUG ADDON: CPT

## 2019-08-17 PROCEDURE — 74176 CT ABD & PELVIS W/O CONTRAST: CPT

## 2019-08-17 PROCEDURE — 36415 COLL VENOUS BLD VENIPUNCTURE: CPT

## 2019-08-17 PROCEDURE — 96374 THER/PROPH/DIAG INJ IV PUSH: CPT

## 2019-08-17 RX ORDER — CEFPODOXIME PROXETIL 100 MG
1 TABLET ORAL
Qty: 14 | Refills: 0
Start: 2019-08-17 | End: 2019-08-23

## 2019-08-17 RX ADMIN — CEFTRIAXONE 100 MILLIGRAM(S): 500 INJECTION, POWDER, FOR SOLUTION INTRAMUSCULAR; INTRAVENOUS at 00:03

## 2019-08-18 LAB
CULTURE RESULTS: SIGNIFICANT CHANGE UP
SPECIMEN SOURCE: SIGNIFICANT CHANGE UP

## 2019-08-22 LAB
CULTURE RESULTS: SIGNIFICANT CHANGE UP
CULTURE RESULTS: SIGNIFICANT CHANGE UP
SPECIMEN SOURCE: SIGNIFICANT CHANGE UP
SPECIMEN SOURCE: SIGNIFICANT CHANGE UP

## 2019-11-11 ENCOUNTER — EMERGENCY (EMERGENCY)
Facility: HOSPITAL | Age: 64
LOS: 1 days | Discharge: HOME | End: 2019-11-11
Attending: EMERGENCY MEDICINE | Admitting: EMERGENCY MEDICINE
Payer: MEDICARE

## 2019-11-11 VITALS
HEIGHT: 67 IN | RESPIRATION RATE: 19 BRPM | WEIGHT: 145.95 LBS | TEMPERATURE: 98 F | OXYGEN SATURATION: 97 % | HEART RATE: 70 BPM | DIASTOLIC BLOOD PRESSURE: 75 MMHG | SYSTOLIC BLOOD PRESSURE: 143 MMHG

## 2019-11-11 VITALS
HEART RATE: 67 BPM | SYSTOLIC BLOOD PRESSURE: 134 MMHG | TEMPERATURE: 98 F | RESPIRATION RATE: 13 BRPM | OXYGEN SATURATION: 97 % | DIASTOLIC BLOOD PRESSURE: 77 MMHG

## 2019-11-11 DIAGNOSIS — Z90.49 ACQUIRED ABSENCE OF OTHER SPECIFIED PARTS OF DIGESTIVE TRACT: Chronic | ICD-10-CM

## 2019-11-11 DIAGNOSIS — Z98.89 OTHER SPECIFIED POSTPROCEDURAL STATES: Chronic | ICD-10-CM

## 2019-11-11 LAB
ACETONE SERPL-MCNC: NEGATIVE — SIGNIFICANT CHANGE UP
ALBUMIN SERPL ELPH-MCNC: 3.9 G/DL — SIGNIFICANT CHANGE UP (ref 3.3–5)
ALP SERPL-CCNC: 78 U/L — SIGNIFICANT CHANGE UP (ref 40–120)
ALT FLD-CCNC: 28 U/L — SIGNIFICANT CHANGE UP (ref 12–78)
ANION GAP SERPL CALC-SCNC: 7 MMOL/L — SIGNIFICANT CHANGE UP (ref 5–17)
APTT BLD: 32.6 SEC — SIGNIFICANT CHANGE UP (ref 28.5–37)
AST SERPL-CCNC: 15 U/L — SIGNIFICANT CHANGE UP (ref 15–37)
BASOPHILS # BLD AUTO: 0.11 K/UL — SIGNIFICANT CHANGE UP (ref 0–0.2)
BASOPHILS NFR BLD AUTO: 1.2 % — SIGNIFICANT CHANGE UP (ref 0–2)
BILIRUB SERPL-MCNC: 0.2 MG/DL — SIGNIFICANT CHANGE UP (ref 0.2–1.2)
BUN SERPL-MCNC: 13 MG/DL — SIGNIFICANT CHANGE UP (ref 7–23)
CALCIUM SERPL-MCNC: 9.4 MG/DL — SIGNIFICANT CHANGE UP (ref 8.5–10.1)
CHLORIDE SERPL-SCNC: 108 MMOL/L — SIGNIFICANT CHANGE UP (ref 96–108)
CO2 SERPL-SCNC: 26 MMOL/L — SIGNIFICANT CHANGE UP (ref 22–31)
CREAT SERPL-MCNC: 0.82 MG/DL — SIGNIFICANT CHANGE UP (ref 0.5–1.3)
EOSINOPHIL # BLD AUTO: 0.46 K/UL — SIGNIFICANT CHANGE UP (ref 0–0.5)
EOSINOPHIL NFR BLD AUTO: 4.9 % — SIGNIFICANT CHANGE UP (ref 0–6)
GLUCOSE SERPL-MCNC: 149 MG/DL — HIGH (ref 70–99)
HCT VFR BLD CALC: 43.2 % — SIGNIFICANT CHANGE UP (ref 34.5–45)
HGB BLD-MCNC: 13.8 G/DL — SIGNIFICANT CHANGE UP (ref 11.5–15.5)
IMM GRANULOCYTES NFR BLD AUTO: 0.4 % — SIGNIFICANT CHANGE UP (ref 0–1.5)
INR BLD: 0.86 RATIO — LOW (ref 0.88–1.16)
LIDOCAIN IGE QN: 82 U/L — SIGNIFICANT CHANGE UP (ref 73–393)
LYMPHOCYTES # BLD AUTO: 3.15 K/UL — SIGNIFICANT CHANGE UP (ref 1–3.3)
LYMPHOCYTES # BLD AUTO: 33.4 % — SIGNIFICANT CHANGE UP (ref 13–44)
MAGNESIUM SERPL-MCNC: 1.9 MG/DL — SIGNIFICANT CHANGE UP (ref 1.6–2.6)
MCHC RBC-ENTMCNC: 27.3 PG — SIGNIFICANT CHANGE UP (ref 27–34)
MCHC RBC-ENTMCNC: 31.9 GM/DL — LOW (ref 32–36)
MCV RBC AUTO: 85.4 FL — SIGNIFICANT CHANGE UP (ref 80–100)
MONOCYTES # BLD AUTO: 0.59 K/UL — SIGNIFICANT CHANGE UP (ref 0–0.9)
MONOCYTES NFR BLD AUTO: 6.3 % — SIGNIFICANT CHANGE UP (ref 2–14)
NEUTROPHILS # BLD AUTO: 5.08 K/UL — SIGNIFICANT CHANGE UP (ref 1.8–7.4)
NEUTROPHILS NFR BLD AUTO: 53.8 % — SIGNIFICANT CHANGE UP (ref 43–77)
NRBC # BLD: 0 /100 WBCS — SIGNIFICANT CHANGE UP (ref 0–0)
NT-PROBNP SERPL-SCNC: 133 PG/ML — HIGH (ref 0–125)
PLATELET # BLD AUTO: 322 K/UL — SIGNIFICANT CHANGE UP (ref 150–400)
POTASSIUM SERPL-MCNC: 4 MMOL/L — SIGNIFICANT CHANGE UP (ref 3.5–5.3)
POTASSIUM SERPL-SCNC: 4 MMOL/L — SIGNIFICANT CHANGE UP (ref 3.5–5.3)
PROT SERPL-MCNC: 7.2 G/DL — SIGNIFICANT CHANGE UP (ref 6–8.3)
PROTHROM AB SERPL-ACNC: 9.7 SEC — LOW (ref 10–12.9)
RBC # BLD: 5.06 M/UL — SIGNIFICANT CHANGE UP (ref 3.8–5.2)
RBC # FLD: 12.7 % — SIGNIFICANT CHANGE UP (ref 10.3–14.5)
SODIUM SERPL-SCNC: 141 MMOL/L — SIGNIFICANT CHANGE UP (ref 135–145)
TROPONIN I SERPL-MCNC: <.015 NG/ML — SIGNIFICANT CHANGE UP (ref 0.01–0.04)
WBC # BLD: 9.43 K/UL — SIGNIFICANT CHANGE UP (ref 3.8–10.5)
WBC # FLD AUTO: 9.43 K/UL — SIGNIFICANT CHANGE UP (ref 3.8–10.5)

## 2019-11-11 PROCEDURE — 71275 CT ANGIOGRAPHY CHEST: CPT | Mod: 26

## 2019-11-11 PROCEDURE — 83690 ASSAY OF LIPASE: CPT

## 2019-11-11 PROCEDURE — 71275 CT ANGIOGRAPHY CHEST: CPT

## 2019-11-11 PROCEDURE — 94640 AIRWAY INHALATION TREATMENT: CPT

## 2019-11-11 PROCEDURE — 83880 ASSAY OF NATRIURETIC PEPTIDE: CPT

## 2019-11-11 PROCEDURE — 71046 X-RAY EXAM CHEST 2 VIEWS: CPT

## 2019-11-11 PROCEDURE — 74174 CTA ABD&PLVS W/CONTRAST: CPT

## 2019-11-11 PROCEDURE — 82962 GLUCOSE BLOOD TEST: CPT

## 2019-11-11 PROCEDURE — 93005 ELECTROCARDIOGRAM TRACING: CPT

## 2019-11-11 PROCEDURE — 85730 THROMBOPLASTIN TIME PARTIAL: CPT

## 2019-11-11 PROCEDURE — 84484 ASSAY OF TROPONIN QUANT: CPT

## 2019-11-11 PROCEDURE — 71046 X-RAY EXAM CHEST 2 VIEWS: CPT | Mod: 26

## 2019-11-11 PROCEDURE — 82009 KETONE BODYS QUAL: CPT

## 2019-11-11 PROCEDURE — 93010 ELECTROCARDIOGRAM REPORT: CPT

## 2019-11-11 PROCEDURE — 83735 ASSAY OF MAGNESIUM: CPT

## 2019-11-11 PROCEDURE — 99285 EMERGENCY DEPT VISIT HI MDM: CPT

## 2019-11-11 PROCEDURE — 36415 COLL VENOUS BLD VENIPUNCTURE: CPT

## 2019-11-11 PROCEDURE — 80053 COMPREHEN METABOLIC PANEL: CPT

## 2019-11-11 PROCEDURE — 99284 EMERGENCY DEPT VISIT MOD MDM: CPT | Mod: 25

## 2019-11-11 PROCEDURE — 99284 EMERGENCY DEPT VISIT MOD MDM: CPT

## 2019-11-11 PROCEDURE — 85610 PROTHROMBIN TIME: CPT

## 2019-11-11 PROCEDURE — 74174 CTA ABD&PLVS W/CONTRAST: CPT | Mod: 26

## 2019-11-11 PROCEDURE — 85027 COMPLETE CBC AUTOMATED: CPT

## 2019-11-11 RX ORDER — OXYCODONE AND ACETAMINOPHEN 5; 325 MG/1; MG/1
2 TABLET ORAL ONCE
Refills: 0 | Status: DISCONTINUED | OUTPATIENT
Start: 2019-11-11 | End: 2019-11-11

## 2019-11-11 RX ORDER — IPRATROPIUM/ALBUTEROL SULFATE 18-103MCG
3 AEROSOL WITH ADAPTER (GRAM) INHALATION ONCE
Refills: 0 | Status: COMPLETED | OUTPATIENT
Start: 2019-11-11 | End: 2019-11-11

## 2019-11-11 RX ADMIN — Medication 3 MILLILITER(S): at 18:59

## 2019-11-11 RX ADMIN — OXYCODONE AND ACETAMINOPHEN 2 TABLET(S): 5; 325 TABLET ORAL at 23:00

## 2019-11-11 NOTE — ED PROVIDER NOTE - CARDIAC, MLM
Normal rate, regular rhythm.  maribeth occ irregular - (bigeminy on monitor) bp taken in each arm is different but pt has labile htn as we talked the bp went up then down

## 2019-11-11 NOTE — ED ADULT TRIAGE NOTE - CHIEF COMPLAINT QUOTE
Diagnosed with bronchitis 2 weeks ago, cough, chest tightness.  Gen weakness. Hx CVA 1 year ago and has been having ongoing pain to L head and L arm

## 2019-11-11 NOTE — ED PROVIDER NOTE - PROGRESS NOTE DETAILS
dw cardiology dr david walker to see will see seen by dr walker; arrhythmia due to pain likely chronic he recommend if ct neg pain to be controlled and he will follow with outpt holter and stress sx  most likely kwabena

## 2019-11-11 NOTE — ED PROVIDER NOTE - PATIENT PORTAL LINK FT
You can access the FollowMyHealth Patient Portal offered by [facility] by registering at the following website: [email or web address]. By joining [employer]’s FollowMyHealth portal, you will also be able to view your health information using other applications (apps) compatible with our system.

## 2019-11-11 NOTE — ED PROVIDER NOTE - CLINICAL SUMMARY MEDICAL DECISION MAKING FREE TEXT BOX
chest pain to back and shoulder versus isolated orthopeidc pain ro dissection cards consult for bigemjacques  treat asthma with nebs pain control  labs ekg cta

## 2019-11-11 NOTE — ED PROVIDER NOTE - OBJECTIVE STATEMENT
Pt is a 63 yo f who is sp stroke with left sided numbness weakness in 11/2018 dm cad asthma still smoking pmd dr carlee de los santos she does not follow with any cardiologist or pulm or neuro. began to have pain in left shoulder to back worse with movement today and associated chest tightness. she alos endorsese shortness of breath and feeling light headed. concerned for worsening asthma attack as well

## 2019-11-11 NOTE — ED PROVIDER NOTE - MUSCULOSKELETAL, MLM
Spine appears normal, left shoulder range of motion is limited due to pain she has arthritic hands dropped shoulder (rotator cuff abnormality_ pain to palp over medial scapula

## 2019-11-11 NOTE — ED ADULT NURSE NOTE - OBJECTIVE STATEMENT
Pt A&Ox4, ambulatory to ED c/o chest pain and shortness of breath.  Pt states that last year she had a stroke which left her with numbness in her face and down left arm as well as pain to left shoulder and arm.  Today, pt had her usual pain down left shoulder and arm but also developed pain to left side breast with shortness of breath and nausea.

## 2019-11-11 NOTE — CONSULT NOTE ADULT - ASSESSMENT
64 F w hx of cva in 2018, dm, asthma present with left shoulder pain and chest pain.    - CP is nonanginal chest pain. pain is msk and reproducible.   - EKG is w/o ischemic changes  - check [place]  - bigeminy likely 2/2 to pain  - check labs including lytes  - pain control  - Further cardiac workup will depend on clinical course.

## 2019-11-11 NOTE — CONSULT NOTE ADULT - SUBJECTIVE AND OBJECTIVE BOX
CHIEF COMPLAINT: Patient is a 64y old  Female who presents with a chief complaint of chest opain    HPI: 64 F w hx of cva in 2018, dm, asthma present with left shoulder pain and chest pain. Notes that she has been complaining of sig left shoulder pain radiating to chest. Hurts when moving her arm. Shoulder pain has been present x 1 year but now worse and moving towards chest. Denies any  dyspnea, palpitations, PND, orthopnea, near syncope, syncope, lower extremity edema, worsening stroke like symptoms.   Noted to have vent bigeminy on tele in ed      EKG: SR     REVIEW OF SYSTEMS:   All other review of systems are negative unless indicated above    PAST MEDICAL & SURGICAL HISTORY:  Stroke  Asthma  Diabetes mellitus  H/O shoulder surgery  History of back surgery  S/P cholecystectomy  H/O knee surgery      SOCIAL HISTORY:  No tobacco, ethanol, or drug abuse.    FAMILY HISTORY:  Family history of premature CAD (Mother)    No family history of acute MI or sudden cardiac death.    MEDICATIONS  (STANDING):  albuterol/ipratropium for Nebulization. 3 milliLiter(s) Nebulizer once    MEDICATIONS  (PRN):      Allergies    Avelox (Seizure)  shellfish (Swelling)    Intolerances        Home meds:  Home Medications:  acetaminophen 325 mg oral tablet: 2 tab(s) orally every 8 hours, As Needed (11 Nov 2019 18:05)  atorvastatin 20 mg oral tablet: 1 tab(s) orally once a day (11 Nov 2019 18:05)  famotidine 20 mg oral tablet: 1 tab(s) orally once a day (11 Nov 2019 18:05)  insulin glargine: 15 unit(s) subcutaneous once a day (11 Nov 2019 18:05)  insulin lispro (concentrated) 200 units/mL subcutaneous solution:  subcutaneous SLIDING SCALE CORRECTIVE REGIMEN (11 Nov 2019 18:05)  metFORMIN 1000 mg oral tablet: 1 tab(s) orally 2 times a day (29 Nov 2018 11:12)        VITAL SIGNS:   Vital Signs Last 24 Hrs  T(C): 36.7 (11 Nov 2019 18:00), Max: 36.7 (11 Nov 2019 18:00)  T(F): 98.1 (11 Nov 2019 18:00), Max: 98.1 (11 Nov 2019 18:00)  HR: 79 (11 Nov 2019 18:25) (70 - 79)  BP: 128/75 (11 Nov 2019 18:25) (128/75 - 143/75)  BP(mean): --  RR: 18 (11 Nov 2019 18:25) (18 - 19)  SpO2: 99% (11 Nov 2019 18:25) (97% - 99%)    I&O's Summary      On Exam:  TELE: SR with runs of v bigeminy  Constitutional: NAD, awake    HEENT: Moist Mucous Membranes, Anicteric  Pulmonary: Decreased breath sounds b/l. No rales, crackles or wheeze appreciated.   Cardiovascular: Regular, S1 and S2, No murmurs, rubs, gallops or clicks  Gastrointestinal: Bowel Sounds present, soft, nontender.   Lymph: No peripheral edema. No lymphadenopathy.  Skin: No visible rashes or ulcers.  Psych:  Mood & affect appropriate  MSK: reproducible left upper chest pain    LABS: All Labs Reviewed:                Blood Culture:         RADIOLOGY:    < from: TTE Echo Doppler w/o Cont (11.27.18 @ 11:19) >     EXAM:  ECHO TTE WO CON COMP W DOPPLR         PROCEDURE DATE:  11/27/2018        INTERPRETATION:  Ordering Physician: SNEHAL FU [id number]  Indication: CVA..  Technician: Tj .  Study Quality: Technical difficult study.   A complete echocardiographic study was performed utilizing standard   protocol including spectral and color Doppler in all echocardiographic   windows.  Height: 170cm  Weight: 66kg  BSA: 1.7m2  Blood Pressure: 130/70  MEASUREMENTS  IVS: 1.0cm  PWT: 1.0cm  LA: 3.2cm  AO: 3.2cm  LVIDd: 4.3 cm.  LVIDs: 3.2cm  LVEF: 60-65%.  PASP: 30mm Hg  FINDINGS  Left Ventricle: Normal in size and  normal LV systolic function with   estimated LVEF 60-65%.  Right Ventricle:  Normal in size and normal RV systolic function.  Left Atrium: Normal in size.  Right Atrium: Normal in size.  Mitral Valve:  Mild  mitral annular calcification is present. Mitral   valve is mildly calcified and no evidence of any mitral stenosis. Mild   mitral regurgitation is present.  Aortic Valve: Aortic root is mild sclerotic and of normal dimension.   Aortic valve is mildly calcified and no evidence of any aortic stenosis.  Tricuspid Valve: Mild tricuspid regurgitation with calculated  PA   systolic pressure 30 mmHg is present.  Pulmonic Valve: Trace Pulmonary Regurgitation is present.  Diastolic Function: Grade 1  LV diastolic dysfunction is present.  Pericardium/Pleura:  No evidence of  any pericardial effusion.  No intracardiac thrombus or vegetations and  tumor.  CONCLUSIONS:  LV size is normal  and normal LV systolic function.    Grade 1 LV diastolic dysfunction is present.    Mild mitral regurgitation is present.    Mild tricuspid regurgitation is present. No evidence of pulmonary   hypertension.    Correlate clinically above findings.                  SNEHAL FU M.D.  This document has been electronically signed. Nov 29 2018  3:04AM                < end of copied text >

## 2019-11-11 NOTE — ED ADULT NURSE NOTE - NSIMPLEMENTINTERV_GEN_ALL_ED
Implemented All Universal Safety Interventions:  [place] to call system. Call bell, personal items and telephone within reach. Instruct patient to call for assistance. Room bathroom lighting operational. Non-slip footwear when patient is off stretcher. Physically safe environment: no spills, clutter or unnecessary equipment. Stretcher in lowest position, wheels locked, appropriate side rails in place.

## 2019-11-12 PROBLEM — I63.9 CEREBRAL INFARCTION, UNSPECIFIED: Chronic | Status: ACTIVE | Noted: 2019-08-16

## 2020-01-27 NOTE — ED PROCEDURE NOTE - PROCEDURE NAME, MLM
HPI:   Oralia is a 44 year old female who presents for a colposcopy procedure due to ASC-H and positive HPV as shown by pap smear from 12/20/19. Pap smear prior to 12/2019 was more than 5 years ago. Pt states she has a h/o abnormal pap smears and has obtained biopsies in the past. After biopsy was obtained, she did not perform follow-up pap smear until 12/2019. Pt has never been pregnant before, despite trying.     Patient is a smoker.     Colposcopy  Date/Time: 1/27/2020 11:15 AM  Performed by: Krista Aguilar DO  Authorized by: Krista Aguilar DO     Consent:     Consent obtained:  Written    Consent given by:  Patient    Procedural risks discussed:  Bleeding, infection and repeat procedure    Patient questions answered: yes      Patient agrees, verbalizes understanding, and wants to proceed: yes      Educational handouts given: yes      Instructions and paperwork completed: yes    Pre-procedure:     Prepped with: acetic acid and Lugol    Indication:     Indication:  ASC-H  Procedure:     Procedure: Colposcopy w/ cervical biopsy and ECC      Monsel's solution was applied: yes      Biopsy(s): yes      Location:  11 o'clock position  Post-procedure:     Findings: White epithelium      Impression: Low grade cervical dysplasia      Patient tolerance of procedure:  Patient tolerated the procedure well with no immediate complications      Component      Latest Ref Rng & Units 1/27/2020   URINE PREGNANCY,QUAL      Negative Negative      Diagnoses and all orders for this visit:  Pre-procedural laboratory examination  -     POCT URINE PREGNANCY  Other orders  -     Colposcopy    Plan:     1. Performed a Colposcopy Procedure today.     Do not insert anything in the vagina until bleeding stops - no sexual intercourse, no tampons.     If you notice fever, chills, or foul smelling odor, schedule a follow up visit.     Encouraged smoking cessation.     Patient will be notified with test results.     Follow up as needed.         Scribe Attestation: On 1/27/2020, Evelyne BERNARDO scribed the services personally performed by Dr. Krista gAuilar DO.   Provider Attestation: The documentation recorded by the scribe accurately reflects the service I personally performed and the decisions made by me, Dr. Krista Aguilar DO.       [place]

## 2020-02-08 ENCOUNTER — EMERGENCY (EMERGENCY)
Facility: HOSPITAL | Age: 65
LOS: 1 days | Discharge: HOME | End: 2020-02-08
Attending: EMERGENCY MEDICINE | Admitting: EMERGENCY MEDICINE
Payer: MEDICARE

## 2020-02-08 VITALS
OXYGEN SATURATION: 95 % | SYSTOLIC BLOOD PRESSURE: 146 MMHG | DIASTOLIC BLOOD PRESSURE: 73 MMHG | TEMPERATURE: 98 F | HEART RATE: 74 BPM | RESPIRATION RATE: 16 BRPM

## 2020-02-08 VITALS
OXYGEN SATURATION: 97 % | HEIGHT: 67 IN | DIASTOLIC BLOOD PRESSURE: 71 MMHG | RESPIRATION RATE: 16 BRPM | WEIGHT: 143.08 LBS | SYSTOLIC BLOOD PRESSURE: 138 MMHG | TEMPERATURE: 98 F | HEART RATE: 70 BPM

## 2020-02-08 DIAGNOSIS — Z98.89 OTHER SPECIFIED POSTPROCEDURAL STATES: Chronic | ICD-10-CM

## 2020-02-08 DIAGNOSIS — Z90.49 ACQUIRED ABSENCE OF OTHER SPECIFIED PARTS OF DIGESTIVE TRACT: Chronic | ICD-10-CM

## 2020-02-08 LAB
ALBUMIN SERPL ELPH-MCNC: 3.5 G/DL — SIGNIFICANT CHANGE UP (ref 3.3–5)
ALP SERPL-CCNC: 75 U/L — SIGNIFICANT CHANGE UP (ref 40–120)
ALT FLD-CCNC: 19 U/L — SIGNIFICANT CHANGE UP (ref 12–78)
ANION GAP SERPL CALC-SCNC: 6 MMOL/L — SIGNIFICANT CHANGE UP (ref 5–17)
APTT BLD: 29.2 SEC — SIGNIFICANT CHANGE UP (ref 28.5–37)
AST SERPL-CCNC: 10 U/L — LOW (ref 15–37)
BASOPHILS # BLD AUTO: 0.13 K/UL — SIGNIFICANT CHANGE UP (ref 0–0.2)
BASOPHILS NFR BLD AUTO: 1.3 % — SIGNIFICANT CHANGE UP (ref 0–2)
BILIRUB SERPL-MCNC: 0.4 MG/DL — SIGNIFICANT CHANGE UP (ref 0.2–1.2)
BUN SERPL-MCNC: 12 MG/DL — SIGNIFICANT CHANGE UP (ref 7–23)
CALCIUM SERPL-MCNC: 9.3 MG/DL — SIGNIFICANT CHANGE UP (ref 8.5–10.1)
CHLORIDE SERPL-SCNC: 106 MMOL/L — SIGNIFICANT CHANGE UP (ref 96–108)
CO2 SERPL-SCNC: 27 MMOL/L — SIGNIFICANT CHANGE UP (ref 22–31)
CREAT SERPL-MCNC: 0.66 MG/DL — SIGNIFICANT CHANGE UP (ref 0.5–1.3)
EOSINOPHIL # BLD AUTO: 0.73 K/UL — HIGH (ref 0–0.5)
EOSINOPHIL NFR BLD AUTO: 7.1 % — HIGH (ref 0–6)
GLUCOSE SERPL-MCNC: 198 MG/DL — HIGH (ref 70–99)
HCT VFR BLD CALC: 42.2 % — SIGNIFICANT CHANGE UP (ref 34.5–45)
HGB BLD-MCNC: 13.7 G/DL — SIGNIFICANT CHANGE UP (ref 11.5–15.5)
IMM GRANULOCYTES NFR BLD AUTO: 0.4 % — SIGNIFICANT CHANGE UP (ref 0–1.5)
INR BLD: 0.93 RATIO — SIGNIFICANT CHANGE UP (ref 0.88–1.16)
LIDOCAIN IGE QN: 64 U/L — LOW (ref 73–393)
LYMPHOCYTES # BLD AUTO: 2.49 K/UL — SIGNIFICANT CHANGE UP (ref 1–3.3)
LYMPHOCYTES # BLD AUTO: 24.4 % — SIGNIFICANT CHANGE UP (ref 13–44)
MCHC RBC-ENTMCNC: 27.6 PG — SIGNIFICANT CHANGE UP (ref 27–34)
MCHC RBC-ENTMCNC: 32.5 GM/DL — SIGNIFICANT CHANGE UP (ref 32–36)
MCV RBC AUTO: 85.1 FL — SIGNIFICANT CHANGE UP (ref 80–100)
MONOCYTES # BLD AUTO: 0.71 K/UL — SIGNIFICANT CHANGE UP (ref 0–0.9)
MONOCYTES NFR BLD AUTO: 6.9 % — SIGNIFICANT CHANGE UP (ref 2–14)
NEUTROPHILS # BLD AUTO: 6.12 K/UL — SIGNIFICANT CHANGE UP (ref 1.8–7.4)
NEUTROPHILS NFR BLD AUTO: 59.9 % — SIGNIFICANT CHANGE UP (ref 43–77)
NRBC # BLD: 0 /100 WBCS — SIGNIFICANT CHANGE UP (ref 0–0)
PLATELET # BLD AUTO: 346 K/UL — SIGNIFICANT CHANGE UP (ref 150–400)
POTASSIUM SERPL-MCNC: 4 MMOL/L — SIGNIFICANT CHANGE UP (ref 3.5–5.3)
POTASSIUM SERPL-SCNC: 4 MMOL/L — SIGNIFICANT CHANGE UP (ref 3.5–5.3)
PROT SERPL-MCNC: 7.2 G/DL — SIGNIFICANT CHANGE UP (ref 6–8.3)
PROTHROM AB SERPL-ACNC: 10.4 SEC — SIGNIFICANT CHANGE UP (ref 10–12.9)
RBC # BLD: 4.96 M/UL — SIGNIFICANT CHANGE UP (ref 3.8–5.2)
RBC # FLD: 12.5 % — SIGNIFICANT CHANGE UP (ref 10.3–14.5)
SODIUM SERPL-SCNC: 139 MMOL/L — SIGNIFICANT CHANGE UP (ref 135–145)
TROPONIN I SERPL-MCNC: 0.02 NG/ML — SIGNIFICANT CHANGE UP (ref 0.01–0.04)
TROPONIN I SERPL-MCNC: <.015 NG/ML — SIGNIFICANT CHANGE UP (ref 0.01–0.04)
WBC # BLD: 10.22 K/UL — SIGNIFICANT CHANGE UP (ref 3.8–10.5)
WBC # FLD AUTO: 10.22 K/UL — SIGNIFICANT CHANGE UP (ref 3.8–10.5)

## 2020-02-08 PROCEDURE — 71045 X-RAY EXAM CHEST 1 VIEW: CPT

## 2020-02-08 PROCEDURE — 85730 THROMBOPLASTIN TIME PARTIAL: CPT

## 2020-02-08 PROCEDURE — 71045 X-RAY EXAM CHEST 1 VIEW: CPT | Mod: 26

## 2020-02-08 PROCEDURE — 85610 PROTHROMBIN TIME: CPT

## 2020-02-08 PROCEDURE — 99285 EMERGENCY DEPT VISIT HI MDM: CPT

## 2020-02-08 PROCEDURE — 71275 CT ANGIOGRAPHY CHEST: CPT | Mod: 26

## 2020-02-08 PROCEDURE — 74174 CTA ABD&PLVS W/CONTRAST: CPT

## 2020-02-08 PROCEDURE — 80053 COMPREHEN METABOLIC PANEL: CPT

## 2020-02-08 PROCEDURE — 71275 CT ANGIOGRAPHY CHEST: CPT

## 2020-02-08 PROCEDURE — 83690 ASSAY OF LIPASE: CPT

## 2020-02-08 PROCEDURE — 93010 ELECTROCARDIOGRAM REPORT: CPT

## 2020-02-08 PROCEDURE — 84484 ASSAY OF TROPONIN QUANT: CPT

## 2020-02-08 PROCEDURE — 99284 EMERGENCY DEPT VISIT MOD MDM: CPT | Mod: 25

## 2020-02-08 PROCEDURE — 74174 CTA ABD&PLVS W/CONTRAST: CPT | Mod: 26

## 2020-02-08 PROCEDURE — 85027 COMPLETE CBC AUTOMATED: CPT

## 2020-02-08 PROCEDURE — 36415 COLL VENOUS BLD VENIPUNCTURE: CPT

## 2020-02-08 PROCEDURE — 93005 ELECTROCARDIOGRAM TRACING: CPT

## 2020-02-08 PROCEDURE — 96360 HYDRATION IV INFUSION INIT: CPT | Mod: XU

## 2020-02-08 RX ORDER — ACETAMINOPHEN 500 MG
650 TABLET ORAL ONCE
Refills: 0 | Status: COMPLETED | OUTPATIENT
Start: 2020-02-08 | End: 2020-02-08

## 2020-02-08 RX ORDER — SODIUM CHLORIDE 9 MG/ML
1000 INJECTION INTRAMUSCULAR; INTRAVENOUS; SUBCUTANEOUS ONCE
Refills: 0 | Status: COMPLETED | OUTPATIENT
Start: 2020-02-08 | End: 2020-02-08

## 2020-02-08 RX ADMIN — SODIUM CHLORIDE 1000 MILLILITER(S): 9 INJECTION INTRAMUSCULAR; INTRAVENOUS; SUBCUTANEOUS at 11:50

## 2020-02-08 RX ADMIN — SODIUM CHLORIDE 1000 MILLILITER(S): 9 INJECTION INTRAMUSCULAR; INTRAVENOUS; SUBCUTANEOUS at 10:50

## 2020-02-08 RX ADMIN — Medication 650 MILLIGRAM(S): at 13:14

## 2020-02-08 RX ADMIN — Medication 650 MILLIGRAM(S): at 16:04

## 2020-02-08 NOTE — ED PROVIDER NOTE - OBJECTIVE STATEMENT
Pt is a 63 yo female with pmhx of DM Asthma CVA with left sided weakness on asa BIBEMS for syncope. Pt states she woke up feeling lightheaded and had nausea pt was in kitchen poured cup of coffee was walking towards bathroom and next thing she remember is her daughter is standing next to her while she is on floor. Pt denies any chest pain sob nvd fever chills. Pt states she has pain on left side since stroke worse since yesterday. Pt has been seen in er for left side pain and negative for dissection. Pt has abrasion on left elbow. Pt denies any headache. Pt also c/o of left side numbness tingling weakness which is chronic.     pcp gee

## 2020-02-08 NOTE — ED PROVIDER NOTE - PROVIDER TOKENS
Massage Therapy Progress Note      Length of treatment: 60-minute **Client received her 6th session free today after 5 previous 60 minute sessions**    Authorization: Patient request    Reviewed contraindications/current health status with Patient    Subjective:  Patient complains of:  Right side Neck /Shoulder, Hip/lower back  Right , and bilateral pectoralis tightness.  Client has also been receiving headaches recently but her chief goal is to increase her level of relaxation     Pain report Prior to treatment:  Pain relief not a goal of massage    Type of treatment requested: Wellness massage and Therapeutic massage    Specific Requests:  Full body massage    Objective Observations:  Hypertonicity noted in:  Upper trapezius Bilateral, pectoralis minor bilateral but greater on the Rt., Levator scapula Right , Lumbar paraspinals Right , Quad group bilateral and Gluteus medius Right     No Hypotonicity/Ischemia noted    No Decreased ROM noted     Additional Observations: client appreciates heavy firm pressure    Assessment:  Patient received Neuromuscular therapy, Cross-fiber friction, Longitudinal friction, Trigger point therapy, Swedish techniques and Myofascial release to affected tissues.    Additional treatment provided: 2 warm moist towels and stones applied to entire back    Response to treatment: Hypertonicity in affected muscles decreased, Breathing slowed and Positive verbal feedback    Pain report after treatment: N/A    No Education/Resources given today    Plan/Recommendations:  Increase water consumption  Massage therapy as desired  Session concluded       PROVIDER:[TOKEN:[[id number]]],PROVIDER:[TOKEN:[[id number]]]

## 2020-02-08 NOTE — ED ADULT NURSE NOTE - CHIEF COMPLAINT QUOTE
pt today was in bathroom and had syncopal episode, pt states she felt dizzy then passed out, ems reports glucose 235, lac to left elbow

## 2020-02-08 NOTE — CONSULT NOTE ADULT - SUBJECTIVE AND OBJECTIVE BOX
[facility] - Jc Berrios, Harley, Nahid, Sara, Jaime Dumont  Office Number: [phone]    Initial Consult Note    CHIEF COMPLAINT: Patient is a 64y old  Female who presents with a chief complaint of syncope    HPI:  This is a 64 year old woman with a history of a prior CVA, DM, asthma who presents after an episode of syncope.  She felt dizzy when she woke up this morning. She went to the kitchen, and sat down at the table. SHe was told that she looked pale.  She got nausea, dizziness, got up, and next thing she knew she was on her back.  SHe has not had chest pain.  She has felt chills, but no fevers.  SHe has not been eating well or hydrating.  SHe denies pnd, orthopnea, LE edema.  This is the first occurrence of this.  She has not had a cardiac evaluation in the past.       PAST MEDICAL & SURGICAL HISTORY:  Stroke  Asthma  Diabetes mellitus  H/O shoulder surgery  History of back surgery  S/P cholecystectomy  H/O knee surgery      SOCIAL HISTORY:  Current smoker    FAMILY HISTORY:  Family history of premature CAD (Mother)         MEDICATIONS  (STANDING):  acetaminophen   Tablet .. 650 milliGRAM(s) Oral once  Home medications reviewed in detail         Allergies    Avelox (Seizure)  shellfish (Swelling)              REVIEW OF SYSTEMS:     All other review of systems is negative unless indicated above    VITAL SIGNS:   Vital Signs Last 24 Hrs  T(C): 36.5 (08 Feb 2020 09:49), Max: 36.5 (08 Feb 2020 09:49)  T(F): 97.7 (08 Feb 2020 09:49), Max: 97.7 (08 Feb 2020 09:49)  HR: 70 (08 Feb 2020 09:49) (70 - 70)  BP: 138/71 (08 Feb 2020 09:49) (138/71 - 138/71)  BP(mean): --  RR: 16 (08 Feb 2020 09:49) (16 - 16)  SpO2: 97% (08 Feb 2020 09:49) (97% - 97%)    I&O's Summary      On Exam:    Constitutional: NAD, alert and oriented x 3  HEENT:  MM are dry  Lungs:  Non-labored, breath sounds are clear bilaterally, No wheezing, rales or rhonchi  Cardiovascular: RRR.  S1 and S2 positive.  No murmurs, rubs, gallops or clicks  Gastrointestinal: Bowel Sounds present, soft, nontender.   Lymph: No peripheral edema. No cervical lymphadenopathy.  Neurological: Alert, no focal deficits  Skin: No rashes or ulcers   Psych:  Mood & affect appropriate.    LABS: All Labs Reviewed:                        13.7   10.22 )-----------( 346      ( 08 Feb 2020 10:58 )             42.2     08 Feb 2020 10:58    139    |  106    |  12     ----------------------------<  198    4.0     |  27     |  0.66     Ca    9.3        08 Feb 2020 10:58    TPro  7.2    /  Alb  3.5    /  TBili  0.4    /  DBili  x      /  AST  10     /  ALT  19     /  AlkPhos  75     08 Feb 2020 10:58    PT/INR - ( 08 Feb 2020 10:58 )   PT: 10.4 sec;   INR: 0.93 ratio         PTT - ( 08 Feb 2020 10:58 )  PTT:29.2 sec  CARDIAC MARKERS ( 08 Feb 2020 10:58 )  .017 ng/mL / x     / x     / x     / x          Blood Culture:         RADIOLOGY:    EKG:  NSR with PVCs in a pattern of bigeminy

## 2020-02-08 NOTE — CONSULT NOTE ADULT - ASSESSMENT
64 year old woman with above history with likely vagal syncope.  I recommend IV hydration in the ED.  She can get two sets of CE to rule out ACS.  If negative, she can be d/c home.  I explained that she needs to hydrate well, and eat better.  She needs an outpatient echocardiogram, nuclear stress test, and a Holter monitor.  She will call our office to schedule these appointments.

## 2020-02-08 NOTE — ED PROVIDER NOTE - ATTENDING CONTRIBUTION TO CARE
pt is a 63 yo f whose pmd is dr carlee de los santos she is a smoker sp cva with residual left sided paresthesias and weakness asthma and diabetes. Today she awoke, feeling unwell, went to make come coffee, appeared pale to her daughter, went to bathroom felt near syncope like she was going to black out and awoke on the floor. no incontinence of urine  she banged her left elbow  bib ems bgl was normal  she endorses poor sleep caring for her grandchildren daily and not eating or drinking much for past few weeks.  no cp no sob now has increased pain left side of abd and arm  on eval:  pleasnt w f nad a and o   heent dry mm oth neg nc at  neck supple cor occas irr on monitor bigeminy pt reports palps started 3 days ago  chest cta no pain no trauma contusion  abd soft6 but has luq tender  exx [place] lue [place] no acute trauma other than abrasion left elbow  mild weakness left leg also old  neuro; nihss=2 due to older chronic no acute changes  plan: ct cta hydration cardiac monitor for arrhythmia neuro crds consultation admission for syncope

## 2020-02-08 NOTE — ED PROVIDER NOTE - PROGRESS NOTE DETAILS
pt re eval has pain no change with this seen by cards this was a clear dehydration with presyncope sx  hydrate obtain ce #2 and if neg ok to dc trop x2 neg seen by dr recio feeling better cta reviewed advised f/u with vascular

## 2020-02-08 NOTE — ED ADULT NURSE NOTE - OBJECTIVE STATEMENT
Received pt in bed alert and oriented x4.  C/O syncope today.  pt dizzy at kitchen table and said passed out.  Pt denies any chest pain or SOB.  EKG complete.  Pt on monitor. Pt denies any cardiac hx. Ongoing nursing care and safety maintained.

## 2020-02-08 NOTE — ED PROVIDER NOTE - PATIENT PORTAL LINK FT
You can access the FollowMyHealth Patient Portal offered by [facility] by registering at the following website: [email or web address]. By joining [employer]’s FollowMyHealth portal, you will also be able to view your health information using other applications (apps) compatible with our system.

## 2020-02-08 NOTE — ED PROVIDER NOTE - CARE PROVIDER_API CALL
Antoine Ruiz)  Internal Medicine  [address] [place] [address]  Phone: [phone]  Fax: [phone]  Follow Up Time:     Jose Chahal)  Surgery  [address], [address]  [place], [place] [id number]  Phone: [phone]  Fax: [phone]  Follow Up Time:

## 2020-03-05 NOTE — PHYSICAL THERAPY INITIAL EVALUATION ADULT - LEVEL OF INDEPENDENCE: STAND/SIT, REHAB EVAL
contact guard O-L Flap Text: The defect edges were debeveled with a #15 scalpel blade.  Given the location of the defect, shape of the defect and the proximity to free margins an O-L flap was deemed most appropriate.  Using a sterile surgical marker, an appropriate advancement flap was drawn incorporating the defect and placing the expected incisions within the relaxed skin tension lines where possible.    The area thus outlined was incised deep to adipose tissue with a #15 scalpel blade.  The skin margins were undermined to an appropriate distance in all directions utilizing iris scissors.

## 2020-03-13 NOTE — H&P ADULT - PROBLEM SELECTOR PLAN 1
Kick Counts    Its normal to worry about your babys health. One way you can know your babys doing well is to record the babys movements once a day. This is called a kick count. Remember to take your kick count records to all your appointments with your healthcare provider.  How to count kicks  Here are tips for counting kicks:  · Choose a time when the baby is active, such as after a meal.   · Sit comfortably or lie on your side.   · The first time the baby moves, write down the time.   · Count each movement until the baby has moved 10 times. This can take from 20 minutes to 2 hours.   · Try to do it at the same time each day.  When to call your healthcare provider  Call your healthcare provider right away if you notice any of the following:  · Your baby moves fewer than 10 times in 2 hours while youre doing kick counts.  · Your baby moves much less often than on the days before.  · You have not felt your baby move all day.  Date Last Reviewed: 8/5/2015 © 2000-2017 [employer]. [address] [place] [address]. All rights reserved. This information is not intended as a substitute for professional medical care. Always follow your healthcare professional's instructions.    Keep your scheduled appointment with your provider.    Call your Doctor if you have any of the following:  Temperature above 100 degrees  Nausea, vomiting and/or diarrhea  Severe headache, dizziness, or blurred vision  Notable increase in swelling of hands or feet  Notable swelling of face and lips  Difficulty, pain or burning with urination  Foul smelling vaginal discharge  Decreased fetal movement    Come to the hospital if you have any of the following symptoms:  Your water breaks  More than 4-6 contractions in 1 hour for 2 or more hours  Vaginal bleeding like a period    After 28 weeks, you should feel 10 distinct fetal movements within a 2 hour period.    It is recommended that you drink 1/2 a gallon of water each  day.  Tea, Soda and Juice are  in addition to this.       NEUROLOGY EVALUATION ,MRI BRAIN TO RULE OUT CVA ,CONTINUE ASA AND STATIN ,tele monitoring to rule out AFIB ,SEEN BY CARD CONSULT

## 2020-09-19 NOTE — DIETITIAN INITIAL EVALUATION ADULT. - PROBLEM SELECTOR PLAN 4
[] : The components of the vaccine(s) to be administered today are listed in the plan of care. The disease(s) for which the vaccine(s) are intended to prevent and the risks have been discussed with the caretaker.  The risks are also included in the appropriate vaccination information statements which have been provided to the patient's caregiver.  The caregiver has given consent to vaccinate. pain management ,PT

## 2021-01-01 NOTE — ED ADULT NURSE NOTE - SUICIDE SCREENING DEPRESSION
As I explained to the family, a small PFO or small ASD is a very common finding.  Approximately 25% of normal adults have a PFO.  This should not be a cause for concern.      Negative

## 2021-06-10 ENCOUNTER — EMERGENCY (EMERGENCY)
Facility: HOSPITAL | Age: 66
LOS: 1 days | Discharge: HOME | End: 2021-06-10
Attending: EMERGENCY MEDICINE | Admitting: EMERGENCY MEDICINE
Payer: MEDICARE

## 2021-06-10 VITALS
SYSTOLIC BLOOD PRESSURE: 144 MMHG | DIASTOLIC BLOOD PRESSURE: 73 MMHG | HEIGHT: 67 IN | TEMPERATURE: 98 F | RESPIRATION RATE: 17 BRPM | OXYGEN SATURATION: 98 % | HEART RATE: 78 BPM | WEIGHT: 145.06 LBS

## 2021-06-10 VITALS
RESPIRATION RATE: 16 BRPM | SYSTOLIC BLOOD PRESSURE: 153 MMHG | DIASTOLIC BLOOD PRESSURE: 80 MMHG | OXYGEN SATURATION: 98 % | HEART RATE: 67 BPM

## 2021-06-10 DIAGNOSIS — Z98.89 OTHER SPECIFIED POSTPROCEDURAL STATES: Chronic | ICD-10-CM

## 2021-06-10 DIAGNOSIS — Z90.49 ACQUIRED ABSENCE OF OTHER SPECIFIED PARTS OF DIGESTIVE TRACT: Chronic | ICD-10-CM

## 2021-06-10 LAB
ALBUMIN SERPL ELPH-MCNC: 3.7 G/DL — SIGNIFICANT CHANGE UP (ref 3.3–5)
ALP SERPL-CCNC: 76 U/L — SIGNIFICANT CHANGE UP (ref 40–120)
ALT FLD-CCNC: 27 U/L — SIGNIFICANT CHANGE UP (ref 12–78)
ANION GAP SERPL CALC-SCNC: 5 MMOL/L — SIGNIFICANT CHANGE UP (ref 5–17)
AST SERPL-CCNC: 13 U/L — LOW (ref 15–37)
BASOPHILS # BLD AUTO: 0.1 K/UL — SIGNIFICANT CHANGE UP (ref 0–0.2)
BASOPHILS NFR BLD AUTO: 1.4 % — SIGNIFICANT CHANGE UP (ref 0–2)
BILIRUB SERPL-MCNC: 0.3 MG/DL — SIGNIFICANT CHANGE UP (ref 0.2–1.2)
BUN SERPL-MCNC: 10 MG/DL — SIGNIFICANT CHANGE UP (ref 7–23)
CALCIUM SERPL-MCNC: 9.4 MG/DL — SIGNIFICANT CHANGE UP (ref 8.5–10.1)
CHLORIDE SERPL-SCNC: 108 MMOL/L — SIGNIFICANT CHANGE UP (ref 96–108)
CO2 SERPL-SCNC: 29 MMOL/L — SIGNIFICANT CHANGE UP (ref 22–31)
CREAT SERPL-MCNC: 0.73 MG/DL — SIGNIFICANT CHANGE UP (ref 0.5–1.3)
EOSINOPHIL # BLD AUTO: 0.88 K/UL — HIGH (ref 0–0.5)
EOSINOPHIL NFR BLD AUTO: 11.9 % — HIGH (ref 0–6)
GLUCOSE SERPL-MCNC: 197 MG/DL — HIGH (ref 70–99)
HCT VFR BLD CALC: 41.6 % — SIGNIFICANT CHANGE UP (ref 34.5–45)
HGB BLD-MCNC: 13.1 G/DL — SIGNIFICANT CHANGE UP (ref 11.5–15.5)
IMM GRANULOCYTES NFR BLD AUTO: 0.3 % — SIGNIFICANT CHANGE UP (ref 0–1.5)
LYMPHOCYTES # BLD AUTO: 2.41 K/UL — SIGNIFICANT CHANGE UP (ref 1–3.3)
LYMPHOCYTES # BLD AUTO: 32.6 % — SIGNIFICANT CHANGE UP (ref 13–44)
MCHC RBC-ENTMCNC: 27.3 PG — SIGNIFICANT CHANGE UP (ref 27–34)
MCHC RBC-ENTMCNC: 31.5 GM/DL — LOW (ref 32–36)
MCV RBC AUTO: 86.7 FL — SIGNIFICANT CHANGE UP (ref 80–100)
MONOCYTES # BLD AUTO: 0.73 K/UL — SIGNIFICANT CHANGE UP (ref 0–0.9)
MONOCYTES NFR BLD AUTO: 9.9 % — SIGNIFICANT CHANGE UP (ref 2–14)
NEUTROPHILS # BLD AUTO: 3.26 K/UL — SIGNIFICANT CHANGE UP (ref 1.8–7.4)
NEUTROPHILS NFR BLD AUTO: 43.9 % — SIGNIFICANT CHANGE UP (ref 43–77)
NRBC # BLD: 0 /100 WBCS — SIGNIFICANT CHANGE UP (ref 0–0)
PLATELET # BLD AUTO: 287 K/UL — SIGNIFICANT CHANGE UP (ref 150–400)
POTASSIUM SERPL-MCNC: 4.9 MMOL/L — SIGNIFICANT CHANGE UP (ref 3.5–5.3)
POTASSIUM SERPL-SCNC: 4.9 MMOL/L — SIGNIFICANT CHANGE UP (ref 3.5–5.3)
PROT SERPL-MCNC: 7.1 G/DL — SIGNIFICANT CHANGE UP (ref 6–8.3)
RBC # BLD: 4.8 M/UL — SIGNIFICANT CHANGE UP (ref 3.8–5.2)
RBC # FLD: 12.6 % — SIGNIFICANT CHANGE UP (ref 10.3–14.5)
SODIUM SERPL-SCNC: 142 MMOL/L — SIGNIFICANT CHANGE UP (ref 135–145)
WBC # BLD: 7.4 K/UL — SIGNIFICANT CHANGE UP (ref 3.8–10.5)
WBC # FLD AUTO: 7.4 K/UL — SIGNIFICANT CHANGE UP (ref 3.8–10.5)

## 2021-06-10 PROCEDURE — 74177 CT ABD & PELVIS W/CONTRAST: CPT

## 2021-06-10 PROCEDURE — 72220 X-RAY EXAM SACRUM TAILBONE: CPT

## 2021-06-10 PROCEDURE — 80053 COMPREHEN METABOLIC PANEL: CPT

## 2021-06-10 PROCEDURE — 72220 X-RAY EXAM SACRUM TAILBONE: CPT | Mod: 26

## 2021-06-10 PROCEDURE — 99285 EMERGENCY DEPT VISIT HI MDM: CPT

## 2021-06-10 PROCEDURE — 72100 X-RAY EXAM L-S SPINE 2/3 VWS: CPT | Mod: 26

## 2021-06-10 PROCEDURE — 72100 X-RAY EXAM L-S SPINE 2/3 VWS: CPT

## 2021-06-10 PROCEDURE — 99284 EMERGENCY DEPT VISIT MOD MDM: CPT | Mod: 25

## 2021-06-10 PROCEDURE — 36415 COLL VENOUS BLD VENIPUNCTURE: CPT

## 2021-06-10 PROCEDURE — 85025 COMPLETE CBC W/AUTO DIFF WBC: CPT

## 2021-06-10 PROCEDURE — 74177 CT ABD & PELVIS W/CONTRAST: CPT | Mod: 26,MA

## 2021-06-10 PROCEDURE — 96374 THER/PROPH/DIAG INJ IV PUSH: CPT | Mod: XU

## 2021-06-10 RX ORDER — SODIUM CHLORIDE 9 MG/ML
1000 INJECTION INTRAMUSCULAR; INTRAVENOUS; SUBCUTANEOUS ONCE
Refills: 0 | Status: COMPLETED | OUTPATIENT
Start: 2021-06-10 | End: 2021-06-10

## 2021-06-10 RX ORDER — MORPHINE SULFATE 50 MG/1
2 CAPSULE, EXTENDED RELEASE ORAL ONCE
Refills: 0 | Status: DISCONTINUED | OUTPATIENT
Start: 2021-06-10 | End: 2021-06-10

## 2021-06-10 RX ADMIN — MORPHINE SULFATE 2 MILLIGRAM(S): 50 CAPSULE, EXTENDED RELEASE ORAL at 08:32

## 2021-06-10 RX ADMIN — SODIUM CHLORIDE 1000 MILLILITER(S): 9 INJECTION INTRAMUSCULAR; INTRAVENOUS; SUBCUTANEOUS at 08:33

## 2021-06-10 NOTE — ED ADULT NURSE NOTE - NSIMPLEMENTINTERV_GEN_ALL_ED
Implemented All Fall Risk Interventions:  [place] to call system. Call bell, personal items and telephone within reach. Instruct patient to call for assistance. Room bathroom lighting operational. Non-slip footwear when patient is off stretcher. Physically safe environment: no spills, clutter or unnecessary equipment. Stretcher in lowest position, wheels locked, appropriate side rails in place. Provide visual cue, wrist band, yellow gown, etc. Monitor gait and stability. Monitor for mental status changes and reorient to person, place, and time. Review medications for side effects contributing to fall risk. Reinforce activity limits and safety measures with patient and family.

## 2021-06-10 NOTE — ED PROVIDER NOTE - CARE PLAN
Principal Discharge DX:	Left upper quadrant abdominal pain  Secondary Diagnosis:	Acute left-sided low back pain without sciatica  Secondary Diagnosis:	Fall, initial encounter

## 2021-06-10 NOTE — ED PROVIDER NOTE - PATIENT PORTAL LINK FT
You can access the FollowMyHealth Patient Portal offered by [facility] by registering at the following website: [email or web address]. By joining [facility]’s FollowMyHealth portal, you will also be able to view your health information using other applications (apps) compatible with our system.

## 2021-06-10 NOTE — ED PROVIDER NOTE - ATTENDING CONTRIBUTION TO CARE
67 yo white female with 2-weeks of ill defined LLQ abdominal pains with mild nausea but no vomiting. Today slipped on water at home and landed on low back and develop back pain so came here for further evaluation and management. Denies any red flag symptoms related to low back. Exam revealed white female in NAD with mild tenderness to deep palpation LLQ as well as midline low lumbar region. Old well healed surgical scar seen low lumbar. I agree with plan and management outlined by PA.

## 2021-06-10 NOTE — ED PROVIDER NOTE - OBJECTIVE STATEMENT
Pt is a 65 yo female with pmhx of DM Asthma CVA with left sided weakness on asa c/o of low back and sacral pain s/p slip and fall on wet floor in her kitchen 4 days ago. Pt denies hitting head no loc no neck pain no chest pain no sob. Pt also c/o of LLQ pain for a few days not sure if related to fall denies nvd constipation dysuria hematuria. Pt took motrin for pain.

## 2021-06-10 NOTE — ED PROVIDER NOTE - CARE PROVIDER_API CALL
Lb Slo)  Internal Medicine; Pulmonary Disease  [address] [place] [address]  Phone: [phone]  Fax: [phone]  Follow Up Time:     Perry Puri)  Orthopaedic Surgery  [address] [place] [address]  Phone: [phone]  Fax: [phone]  Follow Up Time:

## 2021-06-10 NOTE — ED PROVIDER NOTE - NSFOLLOWUPINSTRUCTIONS_ED_ALL_ED_FT
Follow up with pcp and orthopedics   return to er for any worsening symptoms    Tailbone Injury       The tailbone (coccyx) is the small bone at the lower end of the spine. A tailbone injury may involve stretched ligaments, bruising, or a broken bone (fracture). Tailbone injuries can be painful, and some may take a long time to heal.      What are the causes?  This condition may be caused by:  •Falling and landing on the tailbone.      •Repeated strain or friction from sitting for long periods of time. This may include actions such as rowing and bicycling.      •Childbirth.      In some cases, the cause may not be known.      What are the signs or symptoms?  Symptoms of this condition include:  •Pain in the tailbone area or lower back, especially when sitting.      •Pain or difficulty when standing up from a sitting position.      •Bruising or swelling in the tailbone area.      •Painful bowel movements.      •In women, pain during intercourse.        How is this diagnosed?  This condition may be diagnosed based on:  •Your symptoms.      •A physical exam.    If your health care provider suspects a fracture, you may have additional tests, such as:  •X-rays.      •CT scan.      •MRI.        How is this treated?    Most tailbone injuries heal on their own in 4–6 weeks. However, recovery time may be longer if the injury involves a fracture.  Treatment for this condition may include:  •NSAIDs or other over-the-counter medicines to help relieve your pain.      •Using a large, rubber or inflated ring or cushion to take pressure off the tailbone when sitting.      •Physical therapy.      •Injecting the tailbone area with local anesthesia and steroid medicine. This is not normally needed unless the pain does not improve over time with over-the-counter pain medicines.        Follow these instructions at home:    Activity     •Avoid sitting for long periods of time.    •To prevent repeating an injury that is caused by strain or friction:  •Wear appropriate padding and sports gear when bicycling and rowing.        •Increase your activity as the pain allows. Perform any exercises that are recommended by your health care provider or physical therapist.      Managing pain, stiffness, and swelling   •To help decrease discomfort when sitting:   •Sit on your rubber or inflated ring or cushion as told by your health care provider.      •Lean forward when you sit.      •If directed, apply ice to the injured area:  •Put ice in a plastic bag.      •Place a towel between your skin and the bag.      •Leave the ice on for 20 minutes, 2–3 times per day for the first 1–2 days.      •If directed, apply heat to the affected area as often as told by your health care provider. Use the heat source that your health care provider recommends, such as a moist heat pack or a heating pad.  •Place a towel between your skin and the heat source.      •Leave the heat on for 20–30 minutes.      •Remove the heat if your skin turns bright red. This is especially important if you are unable to feel pain, heat, or cold. You may have a greater risk of getting burned.        General instructions     •Take over-the-counter and prescription medicines only as told by your health care provider.    •To prevent or treat constipation or painful bowel movements, your health care provider may recommend that you:  •Drink enough fluid to keep your urine pale yellow.      •Eat foods that are high in fiber, such as fresh fruits and vegetables, whole grains, and beans.      •Limit foods that are high in fat and processed sugars, such as fried and sweet foods.      •Take an over-the-counter or prescription medicine for constipation.        •Keep all follow-up visits as directed by your health care provider. This is important.        Contact a health care provider if:    •Your pain becomes worse or is not controlled with medicine.      •Your bowel movements cause a great deal of discomfort.      •You are unable to have a bowel movement after 4 days.      •You have pain during intercourse.        Summary    •A tailbone injury may involve stretched ligaments, bruising, or a broken bone (fracture).      •Tailbone injuries can be painful. Most heal on their own in 4–6 weeks.      •Treatment may include taking NSAIDs, using a rubber or inflated ring or cushion when sitting, and physical therapy.      •Follow any recommendations from your health care provider to prevent or treat constipation.      This information is not intended to replace advice given to you by your health care provider. Make sure you discuss any questions you have with your health care provider.

## 2021-08-10 NOTE — ED PROVIDER NOTE - CROS ED EYES ALL NEG
Heriberto Jacobo is a 21 year old male presenting with sore throat.    Symptoms started Friday    OTC medications used: none    Denies  known Latex allergy or symptoms of Latex sensitivity.  Social History     Tobacco Use   Smoking Status Never Smoker     All allergies and medications reviewed.  PCP verified:  n/a  Pharmacy verified:  [facility] in [place]  Patient would like communication of their results via:      Home Phone: [phone] (home)  Okay to leave a message containing results? Yes   negative...

## 2021-10-17 ENCOUNTER — EMERGENCY (EMERGENCY)
Facility: HOSPITAL | Age: 66
LOS: 1 days | Discharge: HOME | End: 2021-10-17
Attending: EMERGENCY MEDICINE | Admitting: EMERGENCY MEDICINE
Payer: MEDICARE

## 2021-10-17 VITALS
SYSTOLIC BLOOD PRESSURE: 111 MMHG | HEART RATE: 86 BPM | HEIGHT: 67 IN | TEMPERATURE: 98 F | OXYGEN SATURATION: 95 % | DIASTOLIC BLOOD PRESSURE: 71 MMHG | WEIGHT: 145.95 LBS | RESPIRATION RATE: 18 BRPM

## 2021-10-17 VITALS
SYSTOLIC BLOOD PRESSURE: 96 MMHG | OXYGEN SATURATION: 95 % | RESPIRATION RATE: 18 BRPM | DIASTOLIC BLOOD PRESSURE: 54 MMHG | HEART RATE: 70 BPM | TEMPERATURE: 98 F

## 2021-10-17 DIAGNOSIS — Z98.89 OTHER SPECIFIED POSTPROCEDURAL STATES: Chronic | ICD-10-CM

## 2021-10-17 DIAGNOSIS — Z90.49 ACQUIRED ABSENCE OF OTHER SPECIFIED PARTS OF DIGESTIVE TRACT: Chronic | ICD-10-CM

## 2021-10-17 PROCEDURE — 73110 X-RAY EXAM OF WRIST: CPT

## 2021-10-17 PROCEDURE — 73110 X-RAY EXAM OF WRIST: CPT | Mod: 26,RT

## 2021-10-17 PROCEDURE — 99283 EMERGENCY DEPT VISIT LOW MDM: CPT | Mod: 25

## 2021-10-17 PROCEDURE — 99284 EMERGENCY DEPT VISIT MOD MDM: CPT

## 2021-10-17 RX ORDER — OXYCODONE AND ACETAMINOPHEN 5; 325 MG/1; MG/1
2 TABLET ORAL ONCE
Refills: 0 | Status: DISCONTINUED | OUTPATIENT
Start: 2021-10-17 | End: 2021-10-17

## 2021-10-17 RX ADMIN — OXYCODONE AND ACETAMINOPHEN 2 TABLET(S): 5; 325 TABLET ORAL at 18:30

## 2021-10-17 NOTE — ED ADULT NURSE NOTE - NSICDXPASTSURGICALHX_GEN_ALL_CORE_FT
PAST SURGICAL HISTORY:  H/O knee surgery     H/O shoulder surgery     History of back surgery     S/P cholecystectomy

## 2021-10-17 NOTE — ED ADULT NURSE NOTE - NSICDXFAMILYHX_GEN_ALL_CORE_FT
FAMILY HISTORY:  Mother  Still living? Unknown  Family history of premature CAD, Age at diagnosis: Age Unknown

## 2021-10-17 NOTE — ED PROVIDER NOTE - PATIENT PORTAL LINK FT
You can access the FollowMyHealth Patient Portal offered by [facility] by registering at the following website: [email or web address]. By joining [facility]’s FollowMyHealth portal, you will also be able to view your health information using other applications (apps) compatible with our system.

## 2021-10-17 NOTE — ED ADULT NURSE NOTE - OBJECTIVE STATEMENT
pt to er s/p fall several days ago upon arrival is  alert oriented oob gait steady states she lost her balance and fell c/o right wrist pain

## 2021-10-17 NOTE — ED PROVIDER NOTE - CLINICAL SUMMARY MEDICAL DECISION MAKING FREE TEXT BOX
Trip and fall few days ago here with pain and swelling to right wrist requiring evaluation, imaging and meds.

## 2021-10-17 NOTE — ED PROVIDER NOTE - CARE PROVIDER_API CALL
Don Bob  ORTHOPAEDIC SURGERY  [facility], [address] [place] [address]  Phone: [phone]  Fax: [phone]  Follow Up Time:

## 2021-10-17 NOTE — ED PROVIDER NOTE - NSFOLLOWUPINSTRUCTIONS_ED_ALL_ED_FT
Ice to affected area 20 minutes every hour while you are awake for the next 2-3 days  Motrin 600 mg every 6-hours for pain if needed  Wear splint until evaluated by Orthopedic Surgeon this week  Sling  Elevate affected extremity  Return here if needed          [employer]® CareNotes®     :  [facility]  	                       WRIST FRACTURE IN ADULTS - AfterCare(R) Instructions(ER/ED)           Wrist Fracture in Adults    WHAT YOU NEED TO KNOW:    A wrist fracture is a break in one or more of the bones in your wrist.     Adult Arm Bones         DISCHARGE INSTRUCTIONS:    Return to the emergency department if:   •Your pain gets worse or does not get better after you take pain medicine.      •Your cast or splint breaks, gets wet, or is damaged.      •Your hand or fingers feel numb or cold.      •Your hand or fingers turn white or blue.      •Your splint or cast feels too tight.      •You have more pain or swelling after the cast or splint is put on.      Call your doctor if:   •You have a fever.      •There is a foul smell or blood coming from under the cast.      •You have questions or concerns about your condition or care.      Medicines: You may need any of the following:   •Prescription pain medicine may be given. Ask your healthcare provider how to take this medicine safely. Some prescription pain medicines contain acetaminophen. Do not take other medicines that contain acetaminophen without talking to your healthcare provider. Too much acetaminophen may cause liver damage. Prescription pain medicine may cause constipation. Ask your healthcare provider how to prevent or treat constipation.       •NSAIDs, such as ibuprofen, help decrease swelling, pain, and fever. NSAIDs can cause stomach bleeding or kidney problems in certain people. If you take blood thinner medicine, always ask your healthcare provider if NSAIDs are safe for you. Always read the medicine label and follow directions.      •Acetaminophen decreases pain and fever. It is available without a doctor's order. Ask how much to take and how often to take it. Follow directions. Read the labels of all other medicines you are using to see if they also contain acetaminophen, or ask your doctor or pharmacist. Acetaminophen can cause liver damage if not taken correctly. Do not use more than 4 grams (4,000 milligrams) total of acetaminophen in one day.       •Take your medicine as directed. Contact your healthcare provider if you think your medicine is not helping or if you have side effects. Tell him or her if you are allergic to any medicine. Keep a list of the medicines, vitamins, and herbs you take. Include the amounts, and when and why you take them. Bring the list or the pill bottles to follow-up visits. Carry your medicine list with you in case of an emergency.      Self-care:   •Rest as much as possible. Do not play contact sports until the healthcare provider says it is okay.      •Apply ice on your wrist for 15 to 20 minutes every hour or as directed. Use an ice pack, or put crushed ice in a plastic bag. Cover it with a towel before you place it on your skin. Ice helps prevent tissue damage and decreases swelling and pain.      •Elevate your wrist above the level of your heart as often as possible. This will help decrease swelling and pain. Prop your wrist on pillows or blankets to keep it elevated comfortably.             Cast or splint care:   •You may take a bath or shower as directed. Do not let your cast or splint get wet. Before bathing, cover the cast or splint with 2 plastic trash bags. Tape the bags to your skin above the cast or splint to seal out the water. Keep your arm out of the water in case the bag breaks. If a plaster cast gets wet and soft, call your healthcare provider.      •Check the skin around the cast or splint every day. You may put lotion on any red or sore areas.      •Do not push down or lean on the cast or brace because it may break.      •Do not scratch the skin under the cast by putting a sharp or pointed object inside the cast.      Go to physical therapy as directed: You may need physical therapy after your wrist heals and the cast is removed. A physical therapist can teach you exercises to help improve movement and strength and to decrease pain.    Follow up with your doctor or bone specialist as directed: You may need to return to have your cast removed. You may also need an x-ray to check how well the bone has healed. Write down your questions so you remember to ask them during your visits.

## 2021-10-17 NOTE — ED PROVIDER NOTE - CONSTITUTIONAL, MLM
normal... Well appearing, older white female, awake, alert, oriented to person, place, time/situation and in mild apparent distress.

## 2021-10-17 NOTE — ED PROVIDER NOTE - OBJECTIVE STATEMENT
67 yo white female with trip and fall this past Tuesday and developed pain right wrist with swelling. Went to [facility] yesterday and told that she may have a wrist Fx. As a result of increasing pain and swelling, return here for further evaluation and management.

## 2022-02-02 NOTE — ED ADULT NURSE NOTE - CAS ELECT INFOMATION PROVIDED
What Type Of Note Output Would You Prefer (Optional)?: Bullet Format
Is This A New Presentation, Or A Follow-Up?: Skin Lesion
DC instructions

## 2022-02-07 NOTE — ED ADULT TRIAGE NOTE - CHIEF COMPLAINT QUOTE
pt today was in bathroom and had syncopal episode, pt states she felt dizzy then passed out, ems reports glucose 235, lac to left elbow
61year old obese female with pmhx of T2DM, hypertension, seasonal allergies, asthma, osteoarthritis and acid reflux presents with c/o LUQ are abdominal pain that radiates to back in 2018. Pain has intermittently been presents but due to covid 19 pandemic surgery has been delayed. Patient is here today for presurgical testing for scheduled laparoscopic cholecystectomy on 2/16/2022

## 2022-03-17 NOTE — ED ADULT NURSE NOTE - NEURO ASSESSMENT
Called patient 3x and unable to reach patient due to voicemail not being set up  Patients appointment needs to be cancelled for tomorrow due to provider being out  Plan to offer patient appointment  03/17/22 at 9:30 am with Dr Madhav Chung for cysto 
Pt called to check on test results  He is asking if someone can call him and connect regarding CT scan and work release        Pt can be reached at [phone]
- - -

## 2022-06-20 ENCOUNTER — EMERGENCY (EMERGENCY)
Facility: HOSPITAL | Age: 67
LOS: 1 days | Discharge: HOME | End: 2022-06-20
Attending: EMERGENCY MEDICINE | Admitting: EMERGENCY MEDICINE
Payer: MEDICARE

## 2022-06-20 VITALS
WEIGHT: 143.96 LBS | DIASTOLIC BLOOD PRESSURE: 63 MMHG | RESPIRATION RATE: 17 BRPM | OXYGEN SATURATION: 98 % | SYSTOLIC BLOOD PRESSURE: 139 MMHG | HEIGHT: 67 IN | TEMPERATURE: 99 F | HEART RATE: 82 BPM

## 2022-06-20 DIAGNOSIS — Z98.89 OTHER SPECIFIED POSTPROCEDURAL STATES: Chronic | ICD-10-CM

## 2022-06-20 DIAGNOSIS — Z90.49 ACQUIRED ABSENCE OF OTHER SPECIFIED PARTS OF DIGESTIVE TRACT: Chronic | ICD-10-CM

## 2022-06-20 PROCEDURE — 99283 EMERGENCY DEPT VISIT LOW MDM: CPT | Mod: 25

## 2022-06-20 PROCEDURE — 73630 X-RAY EXAM OF FOOT: CPT | Mod: 26,RT

## 2022-06-20 PROCEDURE — 73630 X-RAY EXAM OF FOOT: CPT

## 2022-06-20 PROCEDURE — 10120 INC&RMVL FB SUBQ TISS SMPL: CPT

## 2022-06-20 PROCEDURE — 90471 IMMUNIZATION ADMIN: CPT

## 2022-06-20 PROCEDURE — 90715 TDAP VACCINE 7 YRS/> IM: CPT

## 2022-06-20 RX ORDER — CEPHALEXIN 500 MG
1 CAPSULE ORAL
Qty: 21 | Refills: 0
Start: 2022-06-20 | End: 2022-06-26

## 2022-06-20 RX ORDER — CEPHALEXIN 500 MG
500 CAPSULE ORAL ONCE
Refills: 0 | Status: COMPLETED | OUTPATIENT
Start: 2022-06-20 | End: 2022-06-20

## 2022-06-20 RX ORDER — BACITRACIN ZINC 500 UNIT/G
1 OINTMENT IN PACKET (EA) TOPICAL ONCE
Refills: 0 | Status: COMPLETED | OUTPATIENT
Start: 2022-06-20 | End: 2022-06-20

## 2022-06-20 RX ORDER — TETANUS TOXOID, REDUCED DIPHTHERIA TOXOID AND ACELLULAR PERTUSSIS VACCINE, ADSORBED 5; 2.5; 8; 8; 2.5 [IU]/.5ML; [IU]/.5ML; UG/.5ML; UG/.5ML; UG/.5ML
0.5 SUSPENSION INTRAMUSCULAR ONCE
Refills: 0 | Status: COMPLETED | OUTPATIENT
Start: 2022-06-20 | End: 2022-06-20

## 2022-06-20 RX ADMIN — Medication 500 MILLIGRAM(S): at 23:06

## 2022-06-20 RX ADMIN — Medication 1 APPLICATION(S): at 22:55

## 2022-06-20 RX ADMIN — TETANUS TOXOID, REDUCED DIPHTHERIA TOXOID AND ACELLULAR PERTUSSIS VACCINE, ADSORBED 0.5 MILLILITER(S): 5; 2.5; 8; 8; 2.5 SUSPENSION INTRAMUSCULAR at 22:39

## 2022-06-20 NOTE — ED PROVIDER NOTE - PATIENT PORTAL LINK FT
You can access the FollowMyHealth Patient Portal offered by [facility] by registering at the following website: [email or web address]. By joining [email or web address]’s FollowMyHealth portal, you will also be able to view your health information using other applications (apps) compatible with our system.

## 2022-06-20 NOTE — ED PROVIDER NOTE - DISCHARGE DATE
Render Note In Bullet Format When Appropriate: No
Show Applicator Variable?: Yes
Consent: The patient's consent was obtained including but not limited to risks of crusting, scabbing, blistering, scarring, darker or lighter pigmentary change, recurrence, incomplete removal and infection.
Number Of Freeze-Thaw Cycles: 2 freeze-thaw cycles
Detail Level: Zone
Duration Of Freeze Thaw-Cycle (Seconds): 2
Post-Care Instructions: I reviewed with the patient in detail post-care instructions. Patient is to wear sunprotection, and avoid picking at any of the treated lesions. Pt may apply Vaseline to crusted or scabbing areas.
20-Jun-2022

## 2022-06-20 NOTE — ED ADULT NURSE NOTE - OBJECTIVE STATEMENT
Patient is 66yo F presents with c/o "I stepped on something a few days ago, now my entire right foot hurts." Patient has small brown spot to heel of foot, tender to palpation, the rest of foot is non-tender, no warmth noted, capillary refill <2seconds, no edema noted, no drainage.

## 2022-06-20 NOTE — ED PROCEDURE NOTE - PROCEDURE NAME, MLM
Hpi Title: Evaluation of Skin Lesions How Severe Are Your Spot(S)?: moderate Have Your Spot(S) Been Treated In The Past?: has not been treated Foreign Body Removal

## 2022-06-20 NOTE — ED PROVIDER NOTE - NSFOLLOWUPINSTRUCTIONS_ED_ALL_ED_FT
1) Follow-up with your Primary Medical Doctor or referred doctor. Call today / next business day for prompt follow-up.  2) Return to Emergency room for any worsening or persistent pain, weakness, fever, or any other concerning symptoms.  3) See attached instruction sheets for additional information, including information regarding signs and symptoms to look out for, reasons to seek immediate care and other important instructions.  4) Follow-up with any specialists as discussed / noted as well.  5) Keflex 500mg three times a day for 7 days.    A foreign body is an object that gets into your body that should not be there. Your hands and feet are common entry points for foreign bodies. Common examples of foreign bodies include:  •Splinters.      •Thorns.      •Pieces of glass, wood, metal, or plastic.      •Metal objects such as needles, nails, and fishing hooks.      Foreign bodies that are not removed quickly may cause infection.      What are the causes?    A foreign body can get in your hand or foot through an injury, such as a scrape or cut, or if something punctures your skin.      What are the signs or symptoms?    Symptoms of having recently gotten a foreign body in your hand or foot include:  •Pain.      •Redness.      •Swelling.      •Tenderness.      •Numbness or tingling.      •Noticing something under the skin.      If the foreign body has caused an infection, symptoms may also include:  •Fever.      •Warmth in the area of the puncture.      •A lump that you can feel under the skin.      •Not being able to use your hand or put weight (bear weight) on your foot.      •Greenish or yellow fluid coming from the puncture area.        How is this diagnosed?    Your health care provider will diagnose a foreign body in the hand or foot by taking a medical history and examining your wound. If the foreign body is deep, you may also have tests, such as an X-ray, MRI, or ultrasound.    In some cases, your health care provider may need to numb the area, open it up to examine what is inside your skin, and remove the object.      How is this treated?    Your health care provider may be able to remove the foreign body while exploring your wound during the diagnosis. If the foreign body is deep or in a wound that is infected, you may need to have a procedure to remove it.    If an incision is needed, it may be closed with stitches (sutures), skin glue, or adhesive strips. A bandage (dressing) will be placed over the incision.    You may also need to get a tetanus shot or take antibiotic medicines to prevent or treat an infection.      Follow these instructions at home:    Medicines     •Take over-the-counter and prescription medicines only as told by your health care provider.      •If you were prescribed an antibiotic medicine, take the antibiotic as told by your health care provider. Do not stop taking the antibiotic even if you start to feel better.        Wound care      •Change your dressing as told by your health care provider.    •Follow instructions from your health care provider about how to take care of your wound or incision. Make sure you:  •Wash your hands with soap and water before you change your dressing. If soap and water are not available, use hand .      •Change your dressing as told by your health care provider.      •Leave sutures, skin glue, or adhesive strips in place. These skin closures may need to stay in place for 2 weeks or longer. If adhesive strip edges start to loosen and curl up, you may trim the loose edges. Do not remove adhesive strips completely unless your health care provider tells you to do that.      •Check your puncture area or incision every day for signs of infection. Check for:  •More redness, swelling, or pain.      •More fluid or blood.      •Warmth.      •Pus or a bad smell.        General instructions     •Return to your normal activities as told by your health care provider. Ask your health care provider what activities are safe for you.      • Do not take baths, swim, or use a hot tub until your health care provider approves. Ask your health care provider if you may take showers. You may only be allowed to take sponge baths.      •Keep all follow-up visits as told by your health care provider. This is important.        How is this prevented?    To protect yourself:  •Wear gloves when working with sharp objects.      •Do not walk barefoot in areas where there may be sharp objects.      •Wear thick-soled shoes or boots when walking in areas where there are sharp objects.        Contact a health care provider if:    •You have a foreign body that has not come out or been removed.      •You have more redness, swelling, or pain around the puncture area or incision.      •You have more fluid or blood coming from the puncture area or incision.      •Your puncture area or incision feels warm to the touch.      •You have pus or a bad smell coming from the puncture area or incision.      •You have a fever.      •You were given a tetanus shot, and you have swelling, severe pain, redness, or bleeding at the injection site.        Get help right away if:    •Your puncture area becomes numb.      •You cannot use your hand or foot.        Summary    •A foreign body is an object that gets into your body that should not be there. Your hands and feet are common entry points for foreign bodies.      •Your health care provider may be able to remove the foreign body while exploring your wound during the diagnosis. If the foreign body is deep or in a wound that is infected, you may need to have a procedure to remove it.      •You may also need to get a tetanus shot or take antibiotic medicines to prevent or treat an infection.      •Check your puncture area or incision every day for signs of infection.      This information is not intended to replace advice given to you by your health care provider. Make sure you discuss any questions you have with your health care provider.

## 2022-06-20 NOTE — ED PROVIDER NOTE - PHYSICAL EXAMINATION
Gen: Alert, NAD  Head/eyes: NC/AT, PERRL  ENT: airway patent  Neck: supple  Pulm/lung: Bilateral clear BS  CV/heart: RRR  GI/Abd: soft, NT/ND, +BS, no guarding/rebound tenderness  Musculoskeletal: no edema/erythema/cyanosis  Skin: no rash, right plantar side of foot anterior to heel +small region possible FB ttp with surround erythema  Neuro: AAOx3, grossly intact

## 2022-06-20 NOTE — ED PROVIDER NOTE - OBJECTIVE STATEMENT
68 yo female hx of asthma, DM, stroke, c/o 2 days of right bottom of heel pain, possibly stepped on FB while in pool with family, worse with pressure, unable to bear weight.  No fever/chills.  No medications taken for this.  Tdap not utd

## 2022-06-20 NOTE — ED PROVIDER NOTE - NS ED ROS FT
Constitutional: - Fever, - Chills, - Anorexia, - Fatigue, - Night sweats  Eyes: - Discharge, - Irritation, - Redness, - Visual changes, - Light sensitivity, - Pain  EARS: - Ear Pain, - Tinnitus, - Decreased hearing  NOSE: - Congestion, - Epistaxis  MOUTH/THROAT: - Vocal Changes, - Drooling, - Sore throat  NECK: - Lumps, - Stiffness, - Pain  CV: - Palpitations, - Chest Pain, - Edema, - Syncope  RESP:  - Cough, - Shortness of Breath, - Dyspnea on Exertion, - Trouble speaking, - Pleuritic pain - Wheezing  GI: - Diarrhea, - Constipation, - Bloody stools, - Nausea, - Vomiting, - Abdominal Pain  : - Dysuria, -Frequency, - Hematuria, - Hesitancy, - Incontinence, - Saddle Anesthesia, - Abnormal discharge  MSK: - Myalgias, - Arthralgias, - Weakness, - Deformities, +foot pain  SKIN: - Color change, - Rash, - Swelling, - Ecchymosis, - Abrasion, - laceration  NEURO: - Change in behavior, - Dec. Alertness, - Headache, - Dizziness, - Change in speech, - Weakness, - Seizure-like activity, - Difficulty ambulating

## 2022-06-20 NOTE — ED PROVIDER NOTE - NSICDXFAMILYHX_GEN_ALL_CORE_FT
[facility]-    I have spoke with pt's RN and pt's sister, Richmond Larkin. Family would like for comfort care to be given at the [facility] and I am waiting to speak to Dr. Bry Schuler about pt condition. 1100- pt is approved for general inpatient care at the [facility]. Transport set up for 1500 to room 110. Pt's sisters are aware and very relieved. Leny HILTON made aware of the above.     Thank you for this referral-    Jazmyne Gill RN BSN  Hospice Liaison  [phone] FAMILY HISTORY:  Mother  Still living? Unknown  Family history of premature CAD, Age at diagnosis: Age Unknown

## 2022-07-23 ENCOUNTER — EMERGENCY (EMERGENCY)
Facility: HOSPITAL | Age: 67
LOS: 1 days | Discharge: HOME | End: 2022-07-23
Attending: EMERGENCY MEDICINE | Admitting: EMERGENCY MEDICINE
Payer: MEDICARE

## 2022-07-23 VITALS
HEART RATE: 84 BPM | SYSTOLIC BLOOD PRESSURE: 126 MMHG | DIASTOLIC BLOOD PRESSURE: 66 MMHG | TEMPERATURE: 99 F | OXYGEN SATURATION: 95 % | WEIGHT: 145.95 LBS | HEIGHT: 67 IN

## 2022-07-23 VITALS
HEART RATE: 79 BPM | RESPIRATION RATE: 16 BRPM | TEMPERATURE: 98 F | DIASTOLIC BLOOD PRESSURE: 71 MMHG | SYSTOLIC BLOOD PRESSURE: 121 MMHG | OXYGEN SATURATION: 96 %

## 2022-07-23 DIAGNOSIS — Z98.89 OTHER SPECIFIED POSTPROCEDURAL STATES: Chronic | ICD-10-CM

## 2022-07-23 DIAGNOSIS — Z90.49 ACQUIRED ABSENCE OF OTHER SPECIFIED PARTS OF DIGESTIVE TRACT: Chronic | ICD-10-CM

## 2022-07-23 LAB
ALBUMIN SERPL ELPH-MCNC: 3.5 G/DL — SIGNIFICANT CHANGE UP (ref 3.3–5)
ALP SERPL-CCNC: 79 U/L — SIGNIFICANT CHANGE UP (ref 40–120)
ALT FLD-CCNC: 18 U/L — SIGNIFICANT CHANGE UP (ref 12–78)
ANION GAP SERPL CALC-SCNC: 5 MMOL/L — SIGNIFICANT CHANGE UP (ref 5–17)
APPEARANCE UR: ABNORMAL
AST SERPL-CCNC: 9 U/L — LOW (ref 15–37)
BASOPHILS # BLD AUTO: 0.08 K/UL — SIGNIFICANT CHANGE UP (ref 0–0.2)
BASOPHILS NFR BLD AUTO: 0.5 % — SIGNIFICANT CHANGE UP (ref 0–2)
BILIRUB SERPL-MCNC: 0.6 MG/DL — SIGNIFICANT CHANGE UP (ref 0.2–1.2)
BILIRUB UR-MCNC: NEGATIVE — SIGNIFICANT CHANGE UP
BUN SERPL-MCNC: 9 MG/DL — SIGNIFICANT CHANGE UP (ref 7–23)
CALCIUM SERPL-MCNC: 9 MG/DL — SIGNIFICANT CHANGE UP (ref 8.5–10.1)
CHLORIDE SERPL-SCNC: 104 MMOL/L — SIGNIFICANT CHANGE UP (ref 96–108)
CO2 SERPL-SCNC: 30 MMOL/L — SIGNIFICANT CHANGE UP (ref 22–31)
COLOR SPEC: YELLOW — SIGNIFICANT CHANGE UP
CREAT SERPL-MCNC: 0.74 MG/DL — SIGNIFICANT CHANGE UP (ref 0.5–1.3)
DIFF PNL FLD: ABNORMAL
EGFR: 88 ML/MIN/1.73M2 — SIGNIFICANT CHANGE UP
EOSINOPHIL # BLD AUTO: 0.38 K/UL — SIGNIFICANT CHANGE UP (ref 0–0.5)
EOSINOPHIL NFR BLD AUTO: 2.4 % — SIGNIFICANT CHANGE UP (ref 0–6)
GLUCOSE SERPL-MCNC: 176 MG/DL — HIGH (ref 70–99)
GLUCOSE UR QL: 250
HCT VFR BLD CALC: 41 % — SIGNIFICANT CHANGE UP (ref 34.5–45)
HGB BLD-MCNC: 13.1 G/DL — SIGNIFICANT CHANGE UP (ref 11.5–15.5)
IMM GRANULOCYTES NFR BLD AUTO: 0.6 % — SIGNIFICANT CHANGE UP (ref 0–1.5)
KETONES UR-MCNC: ABNORMAL
LEUKOCYTE ESTERASE UR-ACNC: ABNORMAL
LYMPHOCYTES # BLD AUTO: 15 % — SIGNIFICANT CHANGE UP (ref 13–44)
LYMPHOCYTES # BLD AUTO: 2.39 K/UL — SIGNIFICANT CHANGE UP (ref 1–3.3)
MCHC RBC-ENTMCNC: 27.6 PG — SIGNIFICANT CHANGE UP (ref 27–34)
MCHC RBC-ENTMCNC: 32 GM/DL — SIGNIFICANT CHANGE UP (ref 32–36)
MCV RBC AUTO: 86.5 FL — SIGNIFICANT CHANGE UP (ref 80–100)
MONOCYTES # BLD AUTO: 1.45 K/UL — HIGH (ref 0–0.9)
MONOCYTES NFR BLD AUTO: 9.1 % — SIGNIFICANT CHANGE UP (ref 2–14)
NEUTROPHILS # BLD AUTO: 11.57 K/UL — HIGH (ref 1.8–7.4)
NEUTROPHILS NFR BLD AUTO: 72.4 % — SIGNIFICANT CHANGE UP (ref 43–77)
NITRITE UR-MCNC: NEGATIVE — SIGNIFICANT CHANGE UP
NRBC # BLD: 0 /100 WBCS — SIGNIFICANT CHANGE UP (ref 0–0)
PH UR: 6 — SIGNIFICANT CHANGE UP (ref 5–8)
PLATELET # BLD AUTO: 302 K/UL — SIGNIFICANT CHANGE UP (ref 150–400)
POTASSIUM SERPL-MCNC: 3.9 MMOL/L — SIGNIFICANT CHANGE UP (ref 3.5–5.3)
POTASSIUM SERPL-SCNC: 3.9 MMOL/L — SIGNIFICANT CHANGE UP (ref 3.5–5.3)
PROT SERPL-MCNC: 7.2 G/DL — SIGNIFICANT CHANGE UP (ref 6–8.3)
PROT UR-MCNC: 15
RAPID RVP RESULT: SIGNIFICANT CHANGE UP
RBC # BLD: 4.74 M/UL — SIGNIFICANT CHANGE UP (ref 3.8–5.2)
RBC # FLD: 12.6 % — SIGNIFICANT CHANGE UP (ref 10.3–14.5)
SARS-COV-2 RNA SPEC QL NAA+PROBE: SIGNIFICANT CHANGE UP
SODIUM SERPL-SCNC: 139 MMOL/L — SIGNIFICANT CHANGE UP (ref 135–145)
SP GR SPEC: 1.02 — SIGNIFICANT CHANGE UP (ref 1.01–1.02)
UROBILINOGEN FLD QL: NEGATIVE — SIGNIFICANT CHANGE UP
WBC # BLD: 15.96 K/UL — HIGH (ref 3.8–10.5)
WBC # FLD AUTO: 15.96 K/UL — HIGH (ref 3.8–10.5)

## 2022-07-23 PROCEDURE — 99284 EMERGENCY DEPT VISIT MOD MDM: CPT | Mod: FS,CS

## 2022-07-23 PROCEDURE — 74176 CT ABD & PELVIS W/O CONTRAST: CPT | Mod: MA

## 2022-07-23 PROCEDURE — 36415 COLL VENOUS BLD VENIPUNCTURE: CPT

## 2022-07-23 PROCEDURE — 99285 EMERGENCY DEPT VISIT HI MDM: CPT | Mod: 25

## 2022-07-23 PROCEDURE — 0225U NFCT DS DNA&RNA 21 SARSCOV2: CPT

## 2022-07-23 PROCEDURE — 87086 URINE CULTURE/COLONY COUNT: CPT

## 2022-07-23 PROCEDURE — 85025 COMPLETE CBC W/AUTO DIFF WBC: CPT

## 2022-07-23 PROCEDURE — 80053 COMPREHEN METABOLIC PANEL: CPT

## 2022-07-23 PROCEDURE — 81001 URINALYSIS AUTO W/SCOPE: CPT

## 2022-07-23 PROCEDURE — 71045 X-RAY EXAM CHEST 1 VIEW: CPT

## 2022-07-23 PROCEDURE — 74176 CT ABD & PELVIS W/O CONTRAST: CPT | Mod: 26,MA

## 2022-07-23 PROCEDURE — 71045 X-RAY EXAM CHEST 1 VIEW: CPT | Mod: 26

## 2022-07-23 RX ORDER — ACETAMINOPHEN 500 MG
975 TABLET ORAL ONCE
Refills: 0 | Status: COMPLETED | OUTPATIENT
Start: 2022-07-23 | End: 2022-07-23

## 2022-07-23 RX ORDER — SODIUM CHLORIDE 9 MG/ML
1000 INJECTION INTRAMUSCULAR; INTRAVENOUS; SUBCUTANEOUS ONCE
Refills: 0 | Status: COMPLETED | OUTPATIENT
Start: 2022-07-23 | End: 2022-07-23

## 2022-07-23 RX ADMIN — Medication 975 MILLIGRAM(S): at 14:06

## 2022-07-23 RX ADMIN — SODIUM CHLORIDE 1000 MILLILITER(S): 9 INJECTION INTRAMUSCULAR; INTRAVENOUS; SUBCUTANEOUS at 14:05

## 2022-07-23 NOTE — ED PROVIDER NOTE - CLINICAL SUMMARY MEDICAL DECISION MAKING FREE TEXT BOX
68 y/o F with multiple complaints: flank pain, dysuria, body aches, cough.  Family with  "bacterial infection."  pt well appearing.  labs,RVP,  UA, CT

## 2022-07-23 NOTE — ED PROVIDER NOTE - NS ED ATTENDING STATEMENT MOD
This was a shared visit with the YADIRA. I reviewed and verified the documentation and independently performed the documented:

## 2022-07-23 NOTE — ED PROVIDER NOTE - RESPIRATORY [+], MLM
Tc to pt on May 13th re: rescheduling appt Pt stated he would gave to speak with son about day he could assist with transportation for appt requested nurse call back    TC today Message left on VM for pt to contact office re appt    Tc to pt  Pt rescheduled for Jun e 10th He accepted appt  TC to pt;'s dgt gaston She was advised of appt and stated she will bring him    COUGH

## 2022-07-23 NOTE — ED PROVIDER NOTE - OBJECTIVE STATEMENT
66 y/o f with pmh CVA, DM, asthma presents to ED for c/o flank pain, dysuria, body aches, cough, nausea, vomiting, headache.  States had had sore throat x 1 week and headache x 3-4 days. Taking aleve with no relief. Not vaccinated for COVID> Famil members are with  "bacterial infection.". Pt reports L ear pain, denies hearing loss. Hx stroke in past left pt with pain / numbness to L arm/ L face, now having L flank pain which she has at times. Reports dysuria buyt no hematuria,

## 2022-07-23 NOTE — ED PROVIDER NOTE - PHYSICAL EXAMINATION
PE:   GEN: Awake, alert, interactive, NAD, non-toxic appearing.   HEAD: Atraumatic  EYES: Sclera white, conjunctiva pink, PERRLA  EARS: unable to visualize BL TM due to cerrumen impaction   NOSE: Patent, no epistaxis  MOUTH/THROAT: Patent, uvula midline, no tonsillar edema or erythema, no acute dental findings, no oral lesions or ulcerations, no Hoarse voice  LYMPH: No pre/post auricular, submandibular, or cervical chain lymphadenopathy   CARDIAC: Reg rate and rhythm, S1,S2, no murmur/rub/gallop. Strong central and peripheral pulses, Brisk cap refill, no evident pedal edema.   RESP: No distress noted. L/S clear = Bilat without accessory muscle use, wheeze, rhonchi, rales.   ABD: soft, supple, non-tender, no guarding. BS x 4, normoactive. + CVAT  NEURO: AOx3, CN II-XII grossly intact without focal deficit. EOMI,   MSK: Moving all extremities with no apparent deformities.   SKIN: Warm, dry, normal color, without apparent rashes.

## 2022-07-23 NOTE — ED PROVIDER NOTE - PROGRESS NOTE DETAILS
Reevaluated patient at bedside.  Patient feeling much improved. HA only slight now. Discussed the results of all diagnostic testing in ED and copies of all reports given.   An opportunity to ask questions was given.  Discussed the importance of prompt, close medical follow-up.  Patient will return with any changes, concerns or persistent / worsening symptoms.  Understanding of all instructions verbalized.

## 2022-07-23 NOTE — ED PROVIDER NOTE - NSFOLLOWUPINSTRUCTIONS_ED_ALL_ED_FT
Viral Respiratory Infection    A viral respiratory infection is an illness that affects parts of the body used for breathing, like the lungs, nose, and throat. It is caused by a germ called a virus. Symptoms can include runny nose, coughing, sneezing, fatigue, body aches, sore throat, fever, or headache. Over the counter medicine can be used to manage the symptoms but the infection typically goes away on its own in 5 to 10 days.     SEEK IMMEDIATE MEDICAL CARE IF YOU HAVE ANY OF THE FOLLOWING SYMPTOMS: shortness of breath, chest pain, fever over 10 days, or lightheadedness/dizziness.    1. TAKE ALL MEDICATIONS AS DIRECTED.  FOR PAIN YOU CAN TAKE IBUPROFEN (MOTRIN, ADVIL) OR ACETAMINOPHEN (TYLENOL) AS NEEDED, AS DIRECTED ON PACKAGING.  2. FOLLOW UP WITH _____YOUR PRIMARY CARE DOCTOR_____ AS DIRECTED.  YOU WERE GIVEN COPIES OF ALL LABS AND IMAGING RESULTS FROM YOUR ER VISIT--PLEASE TAKE THEM WITH YOU TO YOUR APPOINTMENT.  3. IF NEEDED, CALL [phone] TO FIND A PRIMARY CARE PHYSICIAN.  OR CALL [phone] TO MAKE AN APPOINTMENT WITH THE MEDICAL CLINIC.  4. RETURN TO THE ER FOR ANY WORSENING SYMPTOMS.

## 2022-07-23 NOTE — ED ADULT TRIAGE NOTE - CHIEF COMPLAINT QUOTE
I have a sore throat, my left ear hurts, I have a headache and I started coughing and vomiting this am.  there is a virus running around the house with similar complaints.  I am just very tired.,  I cant stay awake.  I took an Aleve last night at 7pm.  nothing since.

## 2022-07-23 NOTE — ED PROVIDER NOTE - PATIENT PORTAL LINK FT
You can access the FollowMyHealth Patient Portal offered by [facility] by registering at the following website: [email or web address]. By joining "[employer]’s FollowMyHealth portal, you will also be able to view your health information using other applications (apps) compatible with our system.

## 2022-07-25 LAB
CULTURE RESULTS: SIGNIFICANT CHANGE UP
SPECIMEN SOURCE: SIGNIFICANT CHANGE UP

## 2022-08-09 ENCOUNTER — EMERGENCY (EMERGENCY)
Facility: HOSPITAL | Age: 67
LOS: 1 days | Discharge: ANOTHER HOSPITAL | End: 2022-08-09
Attending: EMERGENCY MEDICINE | Admitting: EMERGENCY MEDICINE
Payer: MEDICARE

## 2022-08-09 VITALS
SYSTOLIC BLOOD PRESSURE: 151 MMHG | OXYGEN SATURATION: 100 % | HEART RATE: 66 BPM | HEIGHT: 67 IN | DIASTOLIC BLOOD PRESSURE: 86 MMHG | TEMPERATURE: 98 F | RESPIRATION RATE: 16 BRPM

## 2022-08-09 DIAGNOSIS — Z98.890 OTHER SPECIFIED POSTPROCEDURAL STATES: ICD-10-CM

## 2022-08-09 DIAGNOSIS — Z79.84 LONG TERM (CURRENT) USE OF ORAL HYPOGLYCEMIC DRUGS: ICD-10-CM

## 2022-08-09 DIAGNOSIS — Z90.49 ACQUIRED ABSENCE OF OTHER SPECIFIED PARTS OF DIGESTIVE TRACT: ICD-10-CM

## 2022-08-09 DIAGNOSIS — E11.9 TYPE 2 DIABETES MELLITUS WITHOUT COMPLICATIONS: ICD-10-CM

## 2022-08-09 DIAGNOSIS — Z98.89 OTHER SPECIFIED POSTPROCEDURAL STATES: Chronic | ICD-10-CM
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
## 2022-08-09 DIAGNOSIS — Z91.013 ALLERGY TO SEAFOOD: ICD-10-CM

## 2022-08-09 DIAGNOSIS — J45.909 UNSPECIFIED ASTHMA, UNCOMPLICATED: ICD-10-CM

## 2022-08-09 DIAGNOSIS — Z88.1 ALLERGY STATUS TO OTHER ANTIBIOTIC AGENTS STATUS: ICD-10-CM

## 2022-08-09 DIAGNOSIS — Z90.49 ACQUIRED ABSENCE OF OTHER SPECIFIED PARTS OF DIGESTIVE TRACT: Chronic | ICD-10-CM

## 2022-08-09 DIAGNOSIS — S90.851A SUPERFICIAL FOREIGN BODY, RIGHT FOOT, INITIAL ENCOUNTER: ICD-10-CM

## 2022-08-09 DIAGNOSIS — Z23 ENCOUNTER FOR IMMUNIZATION: ICD-10-CM

## 2022-08-09 DIAGNOSIS — Z86.73 PERSONAL HISTORY OF TRANSIENT ISCHEMIC ATTACK (TIA), AND CEREBRAL INFARCTION WITHOUT RESIDUAL DEFICITS: ICD-10-CM
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
## 2022-08-09 LAB
ALBUMIN SERPL ELPH-MCNC: 3.5 G/DL — SIGNIFICANT CHANGE UP (ref 3.3–5)
ALP SERPL-CCNC: 76 U/L — SIGNIFICANT CHANGE UP (ref 40–120)
ALT FLD-CCNC: 23 U/L — SIGNIFICANT CHANGE UP (ref 12–78)
ANION GAP SERPL CALC-SCNC: 4 MMOL/L — LOW (ref 5–17)
APTT BLD: 31.8 SEC — SIGNIFICANT CHANGE UP (ref 27.5–35.5)
AST SERPL-CCNC: 13 U/L — LOW (ref 15–37)
BASOPHILS # BLD AUTO: 0.14 K/UL — SIGNIFICANT CHANGE UP (ref 0–0.2)
BASOPHILS NFR BLD AUTO: 1.4 % — SIGNIFICANT CHANGE UP (ref 0–2)
BILIRUB SERPL-MCNC: 0.5 MG/DL — SIGNIFICANT CHANGE UP (ref 0.2–1.2)
BUN SERPL-MCNC: 14 MG/DL — SIGNIFICANT CHANGE UP (ref 7–23)
CALCIUM SERPL-MCNC: 9.1 MG/DL — SIGNIFICANT CHANGE UP (ref 8.5–10.1)
CHLORIDE SERPL-SCNC: 105 MMOL/L — SIGNIFICANT CHANGE UP (ref 96–108)
CO2 SERPL-SCNC: 28 MMOL/L — SIGNIFICANT CHANGE UP (ref 22–31)
CREAT SERPL-MCNC: 0.78 MG/DL — SIGNIFICANT CHANGE UP (ref 0.5–1.3)
EGFR: 82 ML/MIN/1.73M2 — SIGNIFICANT CHANGE UP
EOSINOPHIL # BLD AUTO: 0.38 K/UL — SIGNIFICANT CHANGE UP (ref 0–0.5)
EOSINOPHIL NFR BLD AUTO: 3.8 % — SIGNIFICANT CHANGE UP (ref 0–6)
GLUCOSE SERPL-MCNC: 264 MG/DL — HIGH (ref 70–99)
HCT VFR BLD CALC: 41.7 % — SIGNIFICANT CHANGE UP (ref 34.5–45)
HGB BLD-MCNC: 13.1 G/DL — SIGNIFICANT CHANGE UP (ref 11.5–15.5)
IMM GRANULOCYTES NFR BLD AUTO: 0.3 % — SIGNIFICANT CHANGE UP (ref 0–1.5)
INR BLD: 0.96 RATIO — SIGNIFICANT CHANGE UP (ref 0.88–1.16)
LYMPHOCYTES # BLD AUTO: 1.85 K/UL — SIGNIFICANT CHANGE UP (ref 1–3.3)
LYMPHOCYTES # BLD AUTO: 18.6 % — SIGNIFICANT CHANGE UP (ref 13–44)
MCHC RBC-ENTMCNC: 27.2 PG — SIGNIFICANT CHANGE UP (ref 27–34)
MCHC RBC-ENTMCNC: 31.4 GM/DL — LOW (ref 32–36)
MCV RBC AUTO: 86.5 FL — SIGNIFICANT CHANGE UP (ref 80–100)
MONOCYTES # BLD AUTO: 0.63 K/UL — SIGNIFICANT CHANGE UP (ref 0–0.9)
MONOCYTES NFR BLD AUTO: 6.3 % — SIGNIFICANT CHANGE UP (ref 2–14)
NEUTROPHILS # BLD AUTO: 6.91 K/UL — SIGNIFICANT CHANGE UP (ref 1.8–7.4)
NEUTROPHILS NFR BLD AUTO: 69.6 % — SIGNIFICANT CHANGE UP (ref 43–77)
NRBC # BLD: 0 /100 WBCS — SIGNIFICANT CHANGE UP (ref 0–0)
NT-PROBNP SERPL-SCNC: 78 PG/ML — SIGNIFICANT CHANGE UP (ref 0–125)
PLATELET # BLD AUTO: 407 K/UL — HIGH (ref 150–400)
POTASSIUM SERPL-MCNC: 4.6 MMOL/L — SIGNIFICANT CHANGE UP (ref 3.5–5.3)
POTASSIUM SERPL-SCNC: 4.6 MMOL/L — SIGNIFICANT CHANGE UP (ref 3.5–5.3)
PROT SERPL-MCNC: 7.3 G/DL — SIGNIFICANT CHANGE UP (ref 6–8.3)
PROTHROM AB SERPL-ACNC: 11.2 SEC — SIGNIFICANT CHANGE UP (ref 10.5–13.4)
RBC # BLD: 4.82 M/UL — SIGNIFICANT CHANGE UP (ref 3.8–5.2)
RBC # FLD: 12.5 % — SIGNIFICANT CHANGE UP (ref 10.3–14.5)
SARS-COV-2 RNA SPEC QL NAA+PROBE: SIGNIFICANT CHANGE UP
SODIUM SERPL-SCNC: 137 MMOL/L — SIGNIFICANT CHANGE UP (ref 135–145)
TROPONIN I, HIGH SENSITIVITY RESULT: 29.9 NG/L — SIGNIFICANT CHANGE UP
WBC # BLD: 9.94 K/UL — SIGNIFICANT CHANGE UP (ref 3.8–10.5)
WBC # FLD AUTO: 9.94 K/UL — SIGNIFICANT CHANGE UP (ref 3.8–10.5)

## 2022-08-09 PROCEDURE — 93010 ELECTROCARDIOGRAM REPORT: CPT

## 2022-08-09 PROCEDURE — 71045 X-RAY EXAM CHEST 1 VIEW: CPT | Mod: 26

## 2022-08-09 PROCEDURE — 99285 EMERGENCY DEPT VISIT HI MDM: CPT

## 2022-08-09 PROCEDURE — 70496 CT ANGIOGRAPHY HEAD: CPT | Mod: 26,MA

## 2022-08-09 PROCEDURE — 0042T: CPT | Mod: MA

## 2022-08-09 PROCEDURE — 70498 CT ANGIOGRAPHY NECK: CPT | Mod: 26,MA

## 2022-08-09 RX ORDER — SODIUM CHLORIDE 9 MG/ML
1000 INJECTION INTRAMUSCULAR; INTRAVENOUS; SUBCUTANEOUS ONCE
Refills: 0 | Status: COMPLETED | OUTPATIENT
Start: 2022-08-09 | End: 2022-08-09

## 2022-08-09 RX ORDER — ACETAMINOPHEN 500 MG
650 TABLET ORAL ONCE
Refills: 0 | Status: COMPLETED | OUTPATIENT
Start: 2022-08-09 | End: 2022-08-09

## 2022-08-09 RX ORDER — METFORMIN HYDROCHLORIDE 850 MG/1
1000 TABLET ORAL ONCE
Refills: 0 | Status: DISCONTINUED | OUTPATIENT
Start: 2022-08-10 | End: 2022-08-12

## 2022-08-09 RX ORDER — METFORMIN HYDROCHLORIDE 850 MG/1
1000 TABLET ORAL ONCE
Refills: 0 | Status: COMPLETED | OUTPATIENT
Start: 2022-08-09 | End: 2022-08-09

## 2022-08-09 RX ORDER — SODIUM CHLORIDE 9 MG/ML
1000 INJECTION INTRAMUSCULAR; INTRAVENOUS; SUBCUTANEOUS
Refills: 0 | Status: DISCONTINUED | OUTPATIENT
Start: 2022-08-09 | End: 2022-08-12

## 2022-08-09 RX ADMIN — SODIUM CHLORIDE 1000 MILLILITER(S): 9 INJECTION INTRAMUSCULAR; INTRAVENOUS; SUBCUTANEOUS at 11:55

## 2022-08-09 RX ADMIN — Medication 650 MILLIGRAM(S): at 21:15

## 2022-08-09 RX ADMIN — Medication 650 MILLIGRAM(S): at 20:09

## 2022-08-09 RX ADMIN — SODIUM CHLORIDE 1000 MILLILITER(S): 9 INJECTION INTRAMUSCULAR; INTRAVENOUS; SUBCUTANEOUS at 10:55

## 2022-08-09 RX ADMIN — SODIUM CHLORIDE 150 MILLILITER(S): 9 INJECTION INTRAMUSCULAR; INTRAVENOUS; SUBCUTANEOUS at 20:16

## 2022-08-09 RX ADMIN — METFORMIN HYDROCHLORIDE 1000 MILLIGRAM(S): 850 TABLET ORAL at 18:11

## 2022-08-09 RX ADMIN — SODIUM CHLORIDE 150 MILLILITER(S): 9 INJECTION INTRAMUSCULAR; INTRAVENOUS; SUBCUTANEOUS at 10:20

## 2022-08-09 NOTE — ED PROVIDER NOTE - PROGRESS NOTE DETAILS
All results were explained to patient and/or family and a copy of all available results given.  Pt has been updated periodically regarding labs and cardio consult.  As per Tim, transfer to [facility] As per Rosas from [facility], transfer to [facility] tomorrow am. pt has been updated regarding transfer status. Signed out Dr. Suarez

## 2022-08-09 NOTE — CONSULT NOTE ADULT - NS ATTEND AMEND GEN_ALL_CORE FT
Patient has episodes of near syncope. She denied any Anginal symptoms.  Clinically she is euvolemic on exam.  While in the ER she was noted to be significantly bradycardic.  On review of telemetry she was noted to have sinus pauses with ventricular escape which most likely was associated with the patient's dizziness while in ER.  I spoke with the EP team for transfer for potential pacemaker placement.  They have accepted her.  I spoke with the patient at length regarding transfer and potential pacemaker who.  She agrees with having the transfer done.

## 2022-08-09 NOTE — ED ADULT NURSE NOTE - NSIMPLEMENTINTERV_GEN_ALL_ED
Implemented All Fall with Harm Risk Interventions:  Norman to call system. Call bell, personal items and telephone within reach. Instruct patient to call for assistance. Room bathroom lighting operational. Non-slip footwear when patient is off stretcher. Physically safe environment: no spills, clutter or unnecessary equipment. Stretcher in lowest position, wheels locked, appropriate side rails in place. Provide visual cue, wrist band, yellow gown, etc. Monitor gait and stability. Monitor for mental status changes and reorient to person, place, and time. Review medications for side effects contributing to fall risk. Reinforce activity limits and safety measures with patient and family. Provide visual clues: red socks.

## 2022-08-09 NOTE — ED ADULT NURSE NOTE - OBJECTIVE STATEMENT
Received the patient in the Er. patient is alert and oriented. Skin warm and dry. C/O Dizziness started at 0700 am. H/O CVA. As per patient and  patient didn't feel right yesterday. Able to move all extremities. Equal strength in all extremities.

## 2022-08-09 NOTE — CONSULT NOTE ADULT - SUBJECTIVE AND OBJECTIVE BOX
[facility] - Jc, Nahid, Sara, Tim, Caryn, Jaime; Office Number: [phone]    Initial Consult Note  CHIEF COMPLAINT: Patient is a 67y old  Female who presents with a chief complaint of near syncope     HPI: 67 year old woman with a history of a prior CVA, DM, asthma, diastolic dysfunction, who presents after an episode of near syncope. Pt was seated on her couch around 7 am today, leaned to get something, got dizzy,  room spinning and got dark, and pt put her head down on the couch. Has h/o CVA and reports left arm numbness, worse past 4 days. As per patient and  patient didn't feel right yesterday.     In ER, In ED, T(F): 98.5, HR:  (66 - 74), BP:  (125/58 - 151/86), RR: 16, SpO2:  (100% - 100%). Labs un remarkable. Troponin HsI 29.9, BNP 78. EKG showed SR @ 72. Patient had episodes of bradycardia on tele with Hr dropped to 38.     Allergies  Avelox (Seizure)  shellfish (Swelling)    PAST MEDICAL & SURGICAL HISTORY:  Diabetes mellitus      Asthma      Stroke      H/O knee surgery      S/P cholecystectomy      History of back surgery      H/O shoulder surgery    MEDICATIONS  (STANDING):  sodium chloride 0.9%. 1000 milliLiter(s) (150 mL/Hr) IV Continuous <Continuous>    MEDICATIONS  (PRN):    FAMILY HISTORY:  Family history of premature CAD (Mother)    SOCIAL HISTORY: No active tobacco, ethanol, or drug abuse.    REVIEW OF SYSTEMS   All other review of systems is negative unless indicated above.    VITAL SIGNS:   Vital Signs Last 24 Hrs  T(C): 36.9 (09 Aug 2022 09:14), Max: 36.9 (09 Aug 2022 09:14)  T(F): 98.5 (09 Aug 2022 09:14), Max: 98.5 (09 Aug 2022 09:14)  HR: 74 (09 Aug 2022 10:13) (66 - 74)  BP: 125/58 (09 Aug 2022 10:13) (125/58 - 151/86)  BP(mean): --  RR: 16 (09 Aug 2022 09:14) (16 - 16)  SpO2: 100% (09 Aug 2022 09:14) (100% - 100%)    Parameters below as of 09 Aug 2022 09:14  Patient On (Oxygen Delivery Method): nasal cannula  O2 Flow (L/min): 4      Physical Exam:  Constitutional: NAD, awake and alert,   HEENT: Moist Mucous Membranes, Anicteric  Pulmonary: Non-labored, breath sounds are clear bilaterally, No wheezing, rales or rhonchi  Cardiovascular: Regular, S1 and S2, No murmurs, No rubs, gallops or clicks  Gastrointestinal: Bowel Sounds present, soft, nontender.   Lymph: No peripheral edema. No lymphadenopathy.  Skin: No visible rashes or ulcers.  Psych:  Mood & affect appropriate    I&O's Summary      LABS: All Labs Reviewed:                        13.1   9.94  )-----------( 407      ( 09 Aug 2022 09:35 )             41.7     09 Aug 2022 09:35    137    |  105    |  14     ----------------------------<  264    4.6     |  28     |  0.78     Ca    9.1        09 Aug 2022 09:35    TPro  7.3    /  Alb  3.5    /  TBili  0.5    /  DBili  x      /  AST  13     /  ALT  23     /  AlkPhos  76     09 Aug 2022 09:35    PT/INR - ( 09 Aug 2022 09:35 )   PT: 11.2 sec;   INR: 0.96 ratio         PTT - ( 09 Aug 2022 09:35 )  PTT:31.8 secTroponin I, High Sensitivity Result: 29.9 ng/L (08-09-22 @ 09:35)  Serum Pro-Brain Natriuretic Peptide: 78 pg/mL (08-09-22 @ 09:35)    Blood Culture:        EXAM:  ECHO TTE WO CON COMP W DOPPLR         PROCEDURE DATE:  11/27/2018        INTERPRETATION:  Ordering Physician: SNEHAL FU [id number]  Indication: CVA..  Technician: Tj .  Study Quality: Technical difficult study.   A complete echocardiographic study was performed utilizing standard   protocol including spectral and color Doppler in all echocardiographic   windows.  Height: 170cm  Weight: 66kg  BSA: 1.7m2  Blood Pressure: 130/70  MEASUREMENTS  IVS: 1.0cm  PWT: 1.0cm  LA: 3.2cm  AO: 3.2cm  LVIDd: 4.3 cm.  LVIDs: 3.2cm  LVEF: 60-65%.  PASP: 30mm Hg  FINDINGS  Left Ventricle: Normal in size and  normal LV systolic function with   estimated LVEF 60-65%.  Right Ventricle:  Normal in size and normal RV systolic function.  Left Atrium: Normal in size.  Right Atrium: Normal in size.  Mitral Valve:  Mild  mitral annular calcification is present. Mitral   valve is mildly calcified and no evidence of any mitral stenosis. Mild   mitral regurgitation is present.  Aortic Valve: Aortic root is mild sclerotic and of normal dimension.   Aortic valve is mildly calcified and no evidence of any aortic stenosis.  Tricuspid Valve: Mild tricuspid regurgitation with calculated  PA   systolic pressure 30 mmHg is present.  Pulmonic Valve: Trace Pulmonary Regurgitation is present.  Diastolic Function: Grade 1  LV diastolic dysfunction is present.  Pericardium/Pleura:  No evidence of  any pericardial effusion.  No intracardiac thrombus or vegetations and  tumor.    CONCLUSIONS:  LV size is normal  and normal LV systolic function.  Grade 1 LV diastolic dysfunction is present.  Mild mitral regurgitation is present.  Mild tricuspid regurgitation is present. No evidence of pulmonary   hypertension.  Correlate clinically above findings.     SNEHAL FU M.D.  This document has been electronically signed. Nov 29 2018  3:04AM        [facility] - Nahid Greene, Sara, Tim, Caryn, Jaime; Office Number: [phone]    Initial Consult Note  CHIEF COMPLAINT: Patient is a 67y old  Female who presents with a chief complaint of near syncope     HPI: 67 year old woman with a history of a prior CVA, DM, asthma, diastolic dysfunction, who presents after an episode of near syncope. Pt was seated on her couch around 7 am today, attempted to get up, got dizzy,  room spinning and got dark, and pt put her head down on the couch. Has h/o CVA and reports left arm numbness, worse past 4 days. As per patient and  patient didn't feel right yesterday.     In ER, In ED, T(F): 98.5, HR:  (66 - 74), BP:  (125/58 - 151/86), RR: 16, SpO2:  (100% - 100%). Labs un remarkable. Troponin HsI 29.9, BNP 78. EKG showed SR @ 72. Patient had episodes of bradycardia on tele with Hr dropped to 38 twice, once while she wa sin radiology and 2nd time in elevator while she was coming back from radiology. Patient felt dizzy both times with these episodes.     Allergies  Avelox (Seizure)  shellfish (Swelling)    PAST MEDICAL & SURGICAL HISTORY:  Diabetes mellitus      Asthma      Stroke      H/O knee surgery      S/P cholecystectomy      History of back surgery      H/O shoulder surgery    MEDICATIONS  (STANDING):  sodium chloride 0.9%. 1000 milliLiter(s) (150 mL/Hr) IV Continuous <Continuous>    MEDICATIONS  (PRN):    FAMILY HISTORY:  Family history of premature CAD (Mother)    SOCIAL HISTORY: No ethanol, or drug abuse. + tobacco, 6 cigarettes per day    REVIEW OF SYSTEMS   All other review of systems is negative unless indicated above.    VITAL SIGNS:   Vital Signs Last 24 Hrs  T(C): 36.9 (09 Aug 2022 09:14), Max: 36.9 (09 Aug 2022 09:14)  T(F): 98.5 (09 Aug 2022 09:14), Max: 98.5 (09 Aug 2022 09:14)  HR: 74 (09 Aug 2022 10:13) (66 - 74)  BP: 125/58 (09 Aug 2022 10:13) (125/58 - 151/86)  BP(mean): --  RR: 16 (09 Aug 2022 09:14) (16 - 16)  SpO2: 100% (09 Aug 2022 09:14) (100% - 100%)    Parameters below as of 09 Aug 2022 09:14  Patient On (Oxygen Delivery Method): nasal cannula  O2 Flow (L/min): 4      Physical Exam:  Constitutional: NAD, awake and alert, Well developed   HEENT: Moist Mucous Membranes, Anicteric  Pulmonary: Non-labored, breath sounds are clear bilaterally, No wheezing, rales or rhonchi  Cardiovascular: Regular, S1 and S2, No murmurs, No rubs, gallops or clicks  Gastrointestinal: Bowel Sounds present, soft, nontender.   Lymph: No peripheral edema. No lymphadenopathy.  Skin: No visible rashes or ulcers.  Psych:  Mood & affect appropriate    I&O's Summary      LABS: All Labs Reviewed:                        13.1   9.94  )-----------( 407      ( 09 Aug 2022 09:35 )             41.7     09 Aug 2022 09:35    137    |  105    |  14     ----------------------------<  264    4.6     |  28     |  0.78     Ca    9.1        09 Aug 2022 09:35    TPro  7.3    /  Alb  3.5    /  TBili  0.5    /  DBili  x      /  AST  13     /  ALT  23     /  AlkPhos  76     09 Aug 2022 09:35    PT/INR - ( 09 Aug 2022 09:35 )   PT: 11.2 sec;   INR: 0.96 ratio         PTT - ( 09 Aug 2022 09:35 )  PTT:31.8 secTroponin I, High Sensitivity Result: 29.9 ng/L (08-09-22 @ 09:35)  Serum Pro-Brain Natriuretic Peptide: 78 pg/mL (08-09-22 @ 09:35)    Blood Culture:        EXAM:  ECHO TTE WO CON COMP W DOPPLR         PROCEDURE DATE:  11/27/2018        INTERPRETATION:  Ordering Physician: SNEHAL FU [id number]  Indication: CVA..  Technician: Tj .  Study Quality: Technical difficult study.   A complete echocardiographic study was performed utilizing standard   protocol including spectral and color Doppler in all echocardiographic   windows.  Height: 170cm  Weight: 66kg  BSA: 1.7m2  Blood Pressure: 130/70  MEASUREMENTS  IVS: 1.0cm  PWT: 1.0cm  LA: 3.2cm  AO: 3.2cm  LVIDd: 4.3 cm.  LVIDs: 3.2cm  LVEF: 60-65%.  PASP: 30mm Hg  FINDINGS  Left Ventricle: Normal in size and  normal LV systolic function with   estimated LVEF 60-65%.  Right Ventricle:  Normal in size and normal RV systolic function.  Left Atrium: Normal in size.  Right Atrium: Normal in size.  Mitral Valve:  Mild  mitral annular calcification is present. Mitral   valve is mildly calcified and no evidence of any mitral stenosis. Mild   mitral regurgitation is present.  Aortic Valve: Aortic root is mild sclerotic and of normal dimension.   Aortic valve is mildly calcified and no evidence of any aortic stenosis.  Tricuspid Valve: Mild tricuspid regurgitation with calculated  PA   systolic pressure 30 mmHg is present.  Pulmonic Valve: Trace Pulmonary Regurgitation is present.  Diastolic Function: Grade 1  LV diastolic dysfunction is present.  Pericardium/Pleura:  No evidence of  any pericardial effusion.  No intracardiac thrombus or vegetations and  tumor.    CONCLUSIONS:  LV size is normal  and normal LV systolic function.  Grade 1 LV diastolic dysfunction is present.  Mild mitral regurgitation is present.  Mild tricuspid regurgitation is present. No evidence of pulmonary   hypertension.  Correlate clinically above findings.     SNEHAL FU M.D.  This document has been electronically signed. Nov 29 2018  3:04AM

## 2022-08-09 NOTE — ED ADULT TRIAGE NOTE - CHIEF COMPLAINT QUOTE
per ems from home with near syncope, pt was leaning down for something and felt dizzy when sitting back up. per report pt has hx of CVA, deficit numbness to left side. per ems pt feels the numbness has gotten worse since yesterday

## 2022-08-09 NOTE — CONSULT NOTE ADULT - ASSESSMENT
67 year old woman with a history of a prior CVA, DM, asthma, diastolic dysfunction, who presents after an episode of near syncope.     - Pt was seated on her couch around 7 am today, attempted to get up, got dizzy, room spinning and got dark, and pt put her head down on the couch  - Episode recurred 30 min afterwards when she attempted to get up again.   - Has h/o CVA and reports left arm numbness for 2 years, worse past 4 days.   - Troponin HsI 29.9, BNP 78.   - EKG showed SR @ 72.   - Patient had episodes of bradycardia on tele with Hr dropped to 38 twice, once while she was in radiology and 2nd time in elevator while she was coming back from radiology. Patient felt dizzy both times with these episodes.   - She is now SR 60-70s on tele with multiple PVCs, trigeminy, bigeminy.   - Patient was in the ER February 2020 for syncope, hydrated and was discharged home. Patient reports for PO intake this time also as her teeth got pulled out.  - Telemetry review showed episodes of bradycardia, junctional rhythm, with some retrograde P waves, concern for sick sinus syndrome.   - Patient would benefit from EP evaluation at this point of time given bradycardia, will likely need transferred to [facility].  - Monitor on tele for further episodes       - Monitor and replete lytes, keep K>4, Mg>2.  - Will continue to follow.    Padmini Pisano, MS FNP, [facility]  Nurse Practitioner- Cardiology   Spectra #[phone] /[phone]

## 2022-08-09 NOTE — ED PROVIDER NOTE - OBJECTIVE STATEMENT
Pt was seated on her couch, got dizzy,  room spinning and got dark, and pt put her head down on the couch around 7am today.  pmd carlee flynn.  Left arm numbness from cva for 2 years , worse past 4 days.  No other complaints.  No neurologist.

## 2022-08-09 NOTE — PHARMACOTHERAPY INTERVENTION NOTE - COMMENTS
Medication reconciliation completed, updated medication list in OMR.  Per discussion with patient and pharmacy, she had previously filled atorvastatin but is no longer taking this medication.

## 2022-08-10 ENCOUNTER — INPATIENT (INPATIENT)
Facility: HOSPITAL | Age: 67
LOS: 1 days | Discharge: HOME | DRG: 244 | End: 2022-08-12
Attending: INTERNAL MEDICINE | Admitting: INTERNAL MEDICINE
Payer: MEDICARE

## 2022-08-10 VITALS
HEIGHT: 67 IN | DIASTOLIC BLOOD PRESSURE: 78 MMHG | SYSTOLIC BLOOD PRESSURE: 143 MMHG | TEMPERATURE: 98 F | RESPIRATION RATE: 16 BRPM | WEIGHT: 136.03 LBS | OXYGEN SATURATION: 97 % | HEART RATE: 65 BPM

## 2022-08-10 VITALS
DIASTOLIC BLOOD PRESSURE: 63 MMHG | SYSTOLIC BLOOD PRESSURE: 147 MMHG | RESPIRATION RATE: 16 BRPM | TEMPERATURE: 98 F | HEART RATE: 67 BPM | OXYGEN SATURATION: 97 %

## 2022-08-10 DIAGNOSIS — Z98.89 OTHER SPECIFIED POSTPROCEDURAL STATES: Chronic | ICD-10-CM

## 2022-08-10 DIAGNOSIS — Z90.49 ACQUIRED ABSENCE OF OTHER SPECIFIED PARTS OF DIGESTIVE TRACT: Chronic | ICD-10-CM

## 2022-08-10 DIAGNOSIS — I25.10 ATHEROSCLEROTIC HEART DISEASE OF NATIVE CORONARY ARTERY WITHOUT ANGINA PECTORIS: ICD-10-CM

## 2022-08-10 LAB
BLD GP AB SCN SERPL QL: NEGATIVE — SIGNIFICANT CHANGE UP
RH IG SCN BLD-IMP: NEGATIVE — SIGNIFICANT CHANGE UP

## 2022-08-10 PROCEDURE — 80053 COMPREHEN METABOLIC PANEL: CPT

## 2022-08-10 PROCEDURE — 70450 CT HEAD/BRAIN W/O DYE: CPT | Mod: MA

## 2022-08-10 PROCEDURE — 0042T: CPT | Mod: MA

## 2022-08-10 PROCEDURE — 84484 ASSAY OF TROPONIN QUANT: CPT

## 2022-08-10 PROCEDURE — 33208 INSRT HEART PM ATRIAL & VENT: CPT | Mod: KX

## 2022-08-10 PROCEDURE — 83880 ASSAY OF NATRIURETIC PEPTIDE: CPT

## 2022-08-10 PROCEDURE — 93306 TTE W/DOPPLER COMPLETE: CPT | Mod: 26

## 2022-08-10 PROCEDURE — 70498 CT ANGIOGRAPHY NECK: CPT | Mod: MA

## 2022-08-10 PROCEDURE — 82962 GLUCOSE BLOOD TEST: CPT

## 2022-08-10 PROCEDURE — 93005 ELECTROCARDIOGRAM TRACING: CPT

## 2022-08-10 PROCEDURE — 70496 CT ANGIOGRAPHY HEAD: CPT | Mod: MA

## 2022-08-10 PROCEDURE — 36415 COLL VENOUS BLD VENIPUNCTURE: CPT

## 2022-08-10 PROCEDURE — 99285 EMERGENCY DEPT VISIT HI MDM: CPT | Mod: 25

## 2022-08-10 PROCEDURE — 93010 ELECTROCARDIOGRAM REPORT: CPT

## 2022-08-10 PROCEDURE — 71045 X-RAY EXAM CHEST 1 VIEW: CPT | Mod: 26

## 2022-08-10 PROCEDURE — U0003: CPT

## 2022-08-10 PROCEDURE — 96360 HYDRATION IV INFUSION INIT: CPT | Mod: XU

## 2022-08-10 PROCEDURE — U0005: CPT

## 2022-08-10 PROCEDURE — 85730 THROMBOPLASTIN TIME PARTIAL: CPT

## 2022-08-10 PROCEDURE — 71045 X-RAY EXAM CHEST 1 VIEW: CPT

## 2022-08-10 PROCEDURE — 85610 PROTHROMBIN TIME: CPT

## 2022-08-10 PROCEDURE — 85025 COMPLETE CBC W/AUTO DIFF WBC: CPT

## 2022-08-10 RX ORDER — INSULIN LISPRO 100/ML
VIAL (ML) SUBCUTANEOUS AT BEDTIME
Refills: 0 | Status: DISCONTINUED | OUTPATIENT
Start: 2022-08-10 | End: 2022-08-12

## 2022-08-10 RX ORDER — CEFAZOLIN SODIUM 1 G
2000 VIAL (EA) INJECTION ONCE
Refills: 0 | Status: DISCONTINUED | OUTPATIENT
Start: 2022-08-10 | End: 2022-08-12

## 2022-08-10 RX ORDER — SODIUM CHLORIDE 9 MG/ML
1000 INJECTION, SOLUTION INTRAVENOUS
Refills: 0 | Status: DISCONTINUED | OUTPATIENT
Start: 2022-08-10 | End: 2022-08-12

## 2022-08-10 RX ORDER — INSULIN LISPRO 100/ML
VIAL (ML) SUBCUTANEOUS
Refills: 0 | Status: DISCONTINUED | OUTPATIENT
Start: 2022-08-10 | End: 2022-08-12

## 2022-08-10 RX ORDER — ACETAMINOPHEN 500 MG
650 TABLET ORAL EVERY 6 HOURS
Refills: 0 | Status: DISCONTINUED | OUTPATIENT
Start: 2022-08-10 | End: 2022-08-12

## 2022-08-10 RX ORDER — DEXTROSE 50 % IN WATER 50 %
25 SYRINGE (ML) INTRAVENOUS ONCE
Refills: 0 | Status: DISCONTINUED | OUTPATIENT
Start: 2022-08-10 | End: 2022-08-12

## 2022-08-10 RX ORDER — ACETAMINOPHEN 500 MG
1000 TABLET ORAL ONCE
Refills: 0 | Status: COMPLETED | OUTPATIENT
Start: 2022-08-10 | End: 2022-08-10

## 2022-08-10 RX ORDER — ALBUTEROL 90 UG/1
2 AEROSOL, METERED ORAL EVERY 6 HOURS
Refills: 0 | Status: DISCONTINUED | OUTPATIENT
Start: 2022-08-10 | End: 2022-08-12

## 2022-08-10 RX ORDER — CEFAZOLIN SODIUM 1 G
2000 VIAL (EA) INJECTION EVERY 8 HOURS
Refills: 0 | Status: COMPLETED | OUTPATIENT
Start: 2022-08-11 | End: 2022-08-11

## 2022-08-10 RX ORDER — CEPHALEXIN 500 MG
250 CAPSULE ORAL EVERY 6 HOURS
Refills: 0 | Status: COMPLETED | OUTPATIENT
Start: 2022-08-11 | End: 2022-08-12

## 2022-08-10 RX ORDER — DEXTROSE 50 % IN WATER 50 %
12.5 SYRINGE (ML) INTRAVENOUS ONCE
Refills: 0 | Status: DISCONTINUED | OUTPATIENT
Start: 2022-08-10 | End: 2022-08-12

## 2022-08-10 RX ORDER — CHLORHEXIDINE GLUCONATE 213 G/1000ML
1 SOLUTION TOPICAL ONCE
Refills: 0 | Status: COMPLETED | OUTPATIENT
Start: 2022-08-10 | End: 2022-08-10

## 2022-08-10 RX ORDER — DEXTROSE 50 % IN WATER 50 %
15 SYRINGE (ML) INTRAVENOUS ONCE
Refills: 0 | Status: DISCONTINUED | OUTPATIENT
Start: 2022-08-10 | End: 2022-08-12

## 2022-08-10 RX ORDER — GLUCAGON INJECTION, SOLUTION 0.5 MG/.1ML
1 INJECTION, SOLUTION SUBCUTANEOUS ONCE
Refills: 0 | Status: DISCONTINUED | OUTPATIENT
Start: 2022-08-10 | End: 2022-08-12

## 2022-08-10 RX ADMIN — Medication 650 MILLIGRAM(S): at 19:36

## 2022-08-10 RX ADMIN — Medication 3: at 21:11

## 2022-08-10 RX ADMIN — Medication 1000 MILLIGRAM(S): at 21:50

## 2022-08-10 RX ADMIN — CHLORHEXIDINE GLUCONATE 1 APPLICATION(S): 213 SOLUTION TOPICAL at 10:05

## 2022-08-10 RX ADMIN — Medication 650 MILLIGRAM(S): at 20:10

## 2022-08-10 RX ADMIN — ALBUTEROL 2 PUFF(S): 90 AEROSOL, METERED ORAL at 21:10

## 2022-08-10 RX ADMIN — Medication 400 MILLIGRAM(S): at 21:28

## 2022-08-10 NOTE — ED ADULT NURSE REASSESSMENT NOTE - NS ED NURSE REASSESS COMMENT FT1
2 times patient's HR  went down to  38. Upon asking patient said she is felt dizzy. Monitoring continuing
Patient is still pending for transport. Transfer send called for an update. Waiting for bed to be available,
Patient stated brief moment of dizziness of the room spinning after waking up for VS, and has since passed, patient is AOx4, in no acute distress at this time, awaiting transfer in AM
Report given to KELL Lopez @ [facility], awaiting transfer.
Resting comfortably. Verbalized feeling better.
Resting comfortably. Verbalized feeling better. Patient had lunch and tolerated well.
patient had dinner and tolerated well
Received pt in bed, resting/sleeping.  Pt appears very comfortable.  Pt on monitor. Awaiting transfer to [place].  aS per night Rn, report given to [place].

## 2022-08-10 NOTE — PRE-ANESTHESIA EVALUATION ADULT - NSANTHPMHFT_GEN_ALL_CORE
Syncope, no falls  Asthm/COPD on daily inhaler, prednisone PRN - last dose approx. 3 weeks ago  No CP/SOB with exertion not O2 dependant

## 2022-08-10 NOTE — PATIENT PROFILE ADULT - FALL HARM RISK - HARM RISK INTERVENTIONS

## 2022-08-10 NOTE — H&P CARDIOLOGY - HISTORY OF PRESENT ILLNESS
This is a 67 y  female with no known implantable devices COVID 19 negative unvaccinated with PMHX Diastolic Dysfunction ,  Copd/Asthma, Diabetes 2 , CVA with left sided numbness for 2 years ,OA ,hx of knee ,back and shoulder   .CT brain reveled old infarcts ,no acute pathology . Pt presented to [facility] with episode of near syncope. She was seated on couch attempted to get up got dizzy and put her head down on trip. Pt had episodes of bradycardia This is a 67 y  female with no known implantable devices COVID 19 negative unvaccinated with PMHX Diastolic Dysfunction ,  Copd/Asthma, Diabetes 2 , CVA with left sided numbness for 2 years ,OA ,hx of knee ,back and shoulder   .CT brain reveled old infarcts ,no acute pathology . Pt presented to [facility] with episode of near syncope. She was seated on couch attempted to get up got dizzy and put her head down on trip. Pt had episodes of bradycardia. Now tx to [facility] for EPS /PPM implant today with Dr. Dowling . No acute distress noted at this time . Currently CP free no sob no lightheadedness of dizziness noted.  < from: TTE Echo Doppler w/o Cont (11.27.18 @ 11:19) >  CONCLUSIONS:  LV size is normal  and normal LV systolic function.    Grade 1 LV diastolic dysfunction is present.    Mild mitral regurgitation is present.    Mild tricuspid regurgitation is present. No evidence of pulmonary   hypertension.    Correlate clinically above findings.   SNEHAL FU M.D.  This document has been electronically signed. Nov 29 2018  3:04AM   < end of copied text >

## 2022-08-10 NOTE — H&P CARDIOLOGY - NEGATIVE NEUROLOGICAL SYMPTOMS
no transient paralysis/no generalized seizures/no focal seizures/no syncope/no tremors/no vertigo/no headache/no loss of consciousness/no confusion

## 2022-08-11 ENCOUNTER — TRANSCRIPTION ENCOUNTER (OUTPATIENT)
Age: 67
End: 2022-08-11

## 2022-08-11 LAB
ANION GAP SERPL CALC-SCNC: 13 MMOL/L — SIGNIFICANT CHANGE UP (ref 5–17)
BASOPHILS # BLD AUTO: 0.01 K/UL — SIGNIFICANT CHANGE UP (ref 0–0.2)
BASOPHILS NFR BLD AUTO: 0.1 % — SIGNIFICANT CHANGE UP (ref 0–2)
BUN SERPL-MCNC: 18 MG/DL — SIGNIFICANT CHANGE UP (ref 7–23)
CALCIUM SERPL-MCNC: 9 MG/DL — SIGNIFICANT CHANGE UP (ref 8.4–10.5)
CHLORIDE SERPL-SCNC: 100 MMOL/L — SIGNIFICANT CHANGE UP (ref 96–108)
CO2 SERPL-SCNC: 23 MMOL/L — SIGNIFICANT CHANGE UP (ref 22–31)
CREAT SERPL-MCNC: 0.75 MG/DL — SIGNIFICANT CHANGE UP (ref 0.5–1.3)
EGFR: 86 ML/MIN/1.73M2 — SIGNIFICANT CHANGE UP
EOSINOPHIL # BLD AUTO: 0 K/UL — SIGNIFICANT CHANGE UP (ref 0–0.5)
EOSINOPHIL NFR BLD AUTO: 0 % — SIGNIFICANT CHANGE UP (ref 0–6)
GLUCOSE SERPL-MCNC: 324 MG/DL — HIGH (ref 70–99)
HCT VFR BLD CALC: 40.6 % — SIGNIFICANT CHANGE UP (ref 34.5–45)
HGB BLD-MCNC: 13.2 G/DL — SIGNIFICANT CHANGE UP (ref 11.5–15.5)
IMM GRANULOCYTES NFR BLD AUTO: 0.3 % — SIGNIFICANT CHANGE UP (ref 0–1.5)
LYMPHOCYTES # BLD AUTO: 0.87 K/UL — LOW (ref 1–3.3)
LYMPHOCYTES # BLD AUTO: 9.6 % — LOW (ref 13–44)
MCHC RBC-ENTMCNC: 26.9 PG — LOW (ref 27–34)
MCHC RBC-ENTMCNC: 32.5 GM/DL — SIGNIFICANT CHANGE UP (ref 32–36)
MCV RBC AUTO: 82.9 FL — SIGNIFICANT CHANGE UP (ref 80–100)
MONOCYTES # BLD AUTO: 0.26 K/UL — SIGNIFICANT CHANGE UP (ref 0–0.9)
MONOCYTES NFR BLD AUTO: 2.9 % — SIGNIFICANT CHANGE UP (ref 2–14)
NEUTROPHILS # BLD AUTO: 7.93 K/UL — HIGH (ref 1.8–7.4)
NEUTROPHILS NFR BLD AUTO: 87.1 % — HIGH (ref 43–77)
NRBC # BLD: 0 /100 WBCS — SIGNIFICANT CHANGE UP (ref 0–0)
PLATELET # BLD AUTO: 369 K/UL — SIGNIFICANT CHANGE UP (ref 150–400)
POTASSIUM SERPL-MCNC: 4.1 MMOL/L — SIGNIFICANT CHANGE UP (ref 3.5–5.3)
POTASSIUM SERPL-SCNC: 4.1 MMOL/L — SIGNIFICANT CHANGE UP (ref 3.5–5.3)
RBC # BLD: 4.9 M/UL — SIGNIFICANT CHANGE UP (ref 3.8–5.2)
RBC # FLD: 12.5 % — SIGNIFICANT CHANGE UP (ref 10.3–14.5)
SODIUM SERPL-SCNC: 136 MMOL/L — SIGNIFICANT CHANGE UP (ref 135–145)
WBC # BLD: 9.1 K/UL — SIGNIFICANT CHANGE UP (ref 3.8–10.5)
WBC # FLD AUTO: 9.1 K/UL — SIGNIFICANT CHANGE UP (ref 3.8–10.5)

## 2022-08-11 PROCEDURE — 99231 SBSQ HOSP IP/OBS SF/LOW 25: CPT

## 2022-08-11 PROCEDURE — 93010 ELECTROCARDIOGRAM REPORT: CPT

## 2022-08-11 PROCEDURE — 71046 X-RAY EXAM CHEST 2 VIEWS: CPT | Mod: 26

## 2022-08-11 PROCEDURE — 99222 1ST HOSP IP/OBS MODERATE 55: CPT

## 2022-08-11 RX ORDER — CEPHALEXIN 500 MG
1 CAPSULE ORAL
Qty: 4 | Refills: 0
Start: 2022-08-11 | End: 2022-08-11

## 2022-08-11 RX ORDER — ACETAMINOPHEN 500 MG
2 TABLET ORAL
Qty: 0 | Refills: 0 | DISCHARGE
Start: 2022-08-11

## 2022-08-11 RX ORDER — MAGNESIUM SULFATE 500 MG/ML
1 VIAL (ML) INJECTION ONCE
Refills: 0 | Status: COMPLETED | OUTPATIENT
Start: 2022-08-11 | End: 2022-08-11

## 2022-08-11 RX ORDER — ACETAMINOPHEN 500 MG
1000 TABLET ORAL ONCE
Refills: 0 | Status: COMPLETED | OUTPATIENT
Start: 2022-08-11 | End: 2022-08-11

## 2022-08-11 RX ORDER — INSULIN GLARGINE 100 [IU]/ML
15 INJECTION, SOLUTION SUBCUTANEOUS ONCE
Refills: 0 | Status: COMPLETED | OUTPATIENT
Start: 2022-08-11 | End: 2022-08-11

## 2022-08-11 RX ADMIN — Medication 100 MILLIGRAM(S): at 08:08

## 2022-08-11 RX ADMIN — Medication 650 MILLIGRAM(S): at 11:31

## 2022-08-11 RX ADMIN — Medication 100 MILLIGRAM(S): at 00:51

## 2022-08-11 RX ADMIN — Medication 250 MILLIGRAM(S): at 21:09

## 2022-08-11 RX ADMIN — Medication 5: at 11:26

## 2022-08-11 RX ADMIN — Medication 400 MILLIGRAM(S): at 05:11

## 2022-08-11 RX ADMIN — Medication 650 MILLIGRAM(S): at 18:10

## 2022-08-11 RX ADMIN — INSULIN GLARGINE 15 UNIT(S): 100 INJECTION, SOLUTION SUBCUTANEOUS at 15:19

## 2022-08-11 RX ADMIN — Medication 2: at 15:25

## 2022-08-11 RX ADMIN — Medication 650 MILLIGRAM(S): at 17:35

## 2022-08-11 RX ADMIN — Medication 650 MILLIGRAM(S): at 12:05

## 2022-08-11 RX ADMIN — Medication 1: at 21:09

## 2022-08-11 RX ADMIN — Medication 1000 MILLIGRAM(S): at 05:45

## 2022-08-11 RX ADMIN — Medication 100 GRAM(S): at 10:31

## 2022-08-11 RX ADMIN — Medication 250 MILLIGRAM(S): at 15:23

## 2022-08-11 RX ADMIN — Medication 3: at 07:32

## 2022-08-11 NOTE — DISCHARGE NOTE PROVIDER - NSDCCPCAREPLAN_GEN_ALL_CORE_FT
PRINCIPAL DISCHARGE DIAGNOSIS  Diagnosis: Near syncope  Assessment and Plan of Treatment: Continue with follow-up visits to your electrophysiology team and with your home remote device monitoring (if applicable). Continue your medications as prescribed. Monitor your left chest wall site for swelling, bleeding.      SECONDARY DISCHARGE DIAGNOSES  Diagnosis: DM type 2, goal HbA1c < 7%  Assessment and Plan of Treatment: Your Hemoglobin A1c level is   Continue to follow with your primary care MD or your endocrinologist.  Follow a heart healthy diabetic diet. If you check your fingerstick glucose at home, call your MD if it is greater than 250mg/dL on 2 occasions or less than 100mg/dL on 2 occasions. Know signs of low blood sugar, such as: dizziness, shakiness, sweating, confusion, hunger, nervousness-drink 4 ounces apple juice if occurs and call your doctor. Know early signs of high blood sugar, such as: frequent urination, increased thirst, blurry vision, fatigue, headache - call your doctor if this occurs. Follow with other practitioners to care for your diabetes, such as ophthalmologist and podiatrist.     PRINCIPAL DISCHARGE DIAGNOSIS  Diagnosis: Near syncope  Assessment and Plan of Treatment: Continue with follow-up visits to your electrophysiology team and with your home remote device monitoring (if applicable). Continue your medications as prescribed. Monitor your left chest wall site for swelling, bleeding.      SECONDARY DISCHARGE DIAGNOSES  Diagnosis: DM type 2, goal HbA1c < 7%  Assessment and Plan of Treatment: Your Hemoglobin A1c level is 9.8 on 8/11/22.  Continue to follow with your primary care MD or your endocrinologist.  Follow a heart healthy diabetic diet. If you check your fingerstick glucose at home, call your MD if it is greater than 250mg/dL on 2 occasions or less than 100mg/dL on 2 occasions. Know signs of low blood sugar, such as: dizziness, shakiness, sweating, confusion, hunger, nervousness-drink 4 ounces apple juice if occurs and call your doctor. Know early signs of high blood sugar, such as: frequent urination, increased thirst, blurry vision, fatigue, headache - call your doctor if this occurs. Follow with other practitioners to care for your diabetes, such as ophthalmologist and podiatrist.

## 2022-08-11 NOTE — DISCHARGE NOTE PROVIDER - PROVIDER TOKENS
PROVIDER:[TOKEN:[[id number]]] FREE:[LAST:[WOUND CHECK],PHONE:[(   )    -],FAX:[(   )    -],ADDRESS:[AT ELECTROPHYSIOLOGY OFFICE AT [facility]],SCHEDULEDAPPT:[08/25/2022],SCHEDULEDAPPTTIME:[10:40 AM]]

## 2022-08-11 NOTE — CONSULT NOTE ADULT - SUBJECTIVE AND OBJECTIVE BOX
[facility] - Nahid Greene, Sara, Sanjuana Dumont  Office Number: [phone]    Initial Consult Note    CHIEF COMPLAINT: Patient is a 67y old  Female who presents with a chief complaint of Near syncope (11 Aug 2022 04:49)      HPI:  This is a 67 y  female with no known implantable devices COVID 19 negative unvaccinated with PMHX Diastolic Dysfunction ,  Copd/Asthma, Diabetes 2 , CVA with left sided numbness for 2 years ,OA ,hx of knee ,back and shoulder   .CT brain reveled old infarcts ,no acute pathology . Pt presented to [facility] with episode of near syncope. She was seated on couch attempted to get up got dizzy and put her head down on trip. Pt had episodes of bradycardia. Now tx to [facility] for EPS /PPM implant today with Dr. Dolwing . No acute distress noted at this time . Currently CP free no sob no lightheadedness of dizziness noted.  < from: TTE Echo Doppler w/o Cont (11.27.18 @ 11:19) >  CONCLUSIONS:  LV size is normal  and normal LV systolic function.    Grade 1 LV diastolic dysfunction is present.    Mild mitral regurgitation is present.    Mild tricuspid regurgitation is present. No evidence of pulmonary   hypertension.    Correlate clinically above findings.   SNEHAL FU M.D.  This document has been electronically signed. Nov 29 2018  3:04AM   < end of copied text >   (10 Aug 2022 10:08)      PAST MEDICAL & SURGICAL HISTORY:  Diabetes mellitus      Asthma      Stroke      H/O knee surgery      S/P cholecystectomy      History of back surgery      H/O shoulder surgery          SOCIAL HISTORY:  No tobacco, ethanol, or drug abuse.    FAMILY HISTORY:  Family history of premature CAD (Mother)      No family history of acute MI or sudden cardiac death.    MEDICATIONS  (STANDING):  ceFAZolin   IVPB 2000 milliGRAM(s) IV Intermittent once  cephalexin 250 milliGRAM(s) Oral every 6 hours  dextrose 5%. 1000 milliLiter(s) (100 mL/Hr) IV Continuous <Continuous>  dextrose 5%. 1000 milliLiter(s) (50 mL/Hr) IV Continuous <Continuous>  dextrose 50% Injectable 25 Gram(s) IV Push once  dextrose 50% Injectable 12.5 Gram(s) IV Push once  dextrose 50% Injectable 25 Gram(s) IV Push once  glucagon  Injectable 1 milliGRAM(s) IntraMuscular once  insulin lispro (ADMELOG) corrective regimen sliding scale   SubCutaneous three times a day before meals  insulin lispro (ADMELOG) corrective regimen sliding scale   SubCutaneous at bedtime    MEDICATIONS  (PRN):  acetaminophen     Tablet .. 650 milliGRAM(s) Oral every 6 hours PRN Mild Pain (1 - 3)  ALBUTerol    90 MICROgram(s) HFA Inhaler 2 Puff(s) Inhalation every 6 hours PRN Shortness of Breath and/or Wheezing  dextrose Oral Gel 15 Gram(s) Oral once PRN Blood Glucose LESS THAN 70 milliGRAM(s)/deciliter      Allergies    Avelox (Seizure)  shellfish (Swelling)    Intolerances        REVIEW OF SYSTEMS:    CONSTITUTIONAL: No weakness, fevers or chills  EYES/ENT: No visual changes;  No vertigo or throat pain   NECK: No pain or stiffness  RESPIRATORY: No cough, wheezing, hemoptysis; No shortness of breath  CARDIOVASCULAR: No chest pain or palpitations  GASTROINTESTINAL: No abdominal pain. No nausea, vomiting, or hematemesis; No diarrhea or constipation. No melena or hematochezia.  GENITOURINARY: No dysuria, frequency or hematuria  NEUROLOGICAL: No numbness or weakness  SKIN: No itching or rash  All other review of systems is negative unless indicated above    VITAL SIGNS:   Vital Signs Last 24 Hrs  T(C): 36.4 (11 Aug 2022 04:11), Max: 36.6 (10 Aug 2022 10:08)  T(F): 97.5 (11 Aug 2022 04:11), Max: 97.8 (10 Aug 2022 10:08)  HR: 74 (11 Aug 2022 04:11) (65 - 111)  BP: 110/67 (11 Aug 2022 04:11) (101/71 - 148/86)  BP(mean): 82 (11 Aug 2022 04:11) (75 - 99)  RR: 18 (11 Aug 2022 04:11) (15 - 19)  SpO2: 96% (11 Aug 2022 04:11) (92% - 99%)    Parameters below as of 11 Aug 2022 04:11  Patient On (Oxygen Delivery Method): room air        I&O's Summary      On Exam:    Constitutional: NAD, alert and oriented x 3  Lungs:  Non-labored, breath sounds are clear bilaterally, No wheezing, rales or rhonchi  Cardiovascular: RRR.  S1 and S2 positive.  No murmurs, rubs, gallops or clicks  Gastrointestinal: Bowel Sounds present, soft, nontender.   Lymph: No peripheral edema. No cervical lymphadenopathy.  Neurological: Alert, no focal deficits  Skin: No rashes or ulcers   Psych:  Mood & affect appropriate.    LABS: All Labs Reviewed:                        13.2   9.10  )-----------( 369      ( 11 Aug 2022 04:14 )             40.6                         13.1   9.94  )-----------( 407      ( 09 Aug 2022 09:35 )             41.7     11 Aug 2022 04:14    136    |  100    |  18     ----------------------------<  324    4.1     |  23     |  0.75   09 Aug 2022 09:35    137    |  105    |  14     ----------------------------<  264    4.6     |  28     |  0.78     Ca    9.0        11 Aug 2022 04:14  Ca    9.1        09 Aug 2022 09:35  Mg     1.8       11 Aug 2022 04:14    TPro  7.3    /  Alb  3.5    /  TBili  0.5    /  DBili  x      /  AST  13     /  ALT  23     /  AlkPhos  76     09 Aug 2022 09:35          Blood Culture:   08-09 @ 09:35  Pro Bnp 78        RADIOLOGY:    tele: saúl voss

## 2022-08-11 NOTE — DISCHARGE NOTE PROVIDER - CARE PROVIDER_API CALL
Waqas Dowling)  Cardiac Electrophysiology; Cardiology  [address] [place] [address]  Phone: [phone]  Fax: ()-  Follow Up Time:    WOUND CHECK,   AT ELECTROPHYSIOLOGY OFFICE AT [facility]  Phone: (   )    -  Fax: (   )    -  Scheduled Appointment: 08/25/2022 10:40 AM

## 2022-08-11 NOTE — DISCHARGE NOTE PROVIDER - CARE PROVIDERS DIRECT ADDRESSES
[email or web address] ,DirectAddress_Unknown

## 2022-08-11 NOTE — DISCHARGE NOTE PROVIDER - NSDCFUSCHEDAPPT_GEN_ALL_CORE_FT
[facility]  ELECTROPH [address]  Scheduled Appointment: 08/25/2022

## 2022-08-11 NOTE — DISCHARGE NOTE PROVIDER - HOSPITAL COURSE
HPI:  This is a 67 y  female with no known implantable devices COVID 19 negative unvaccinated with PMHX Diastolic Dysfunction ,  Copd/Asthma, Diabetes 2 , CVA with left sided numbness for 2 years ,OA ,hx of knee ,back and shoulder   .CT brain reveled old infarcts ,no acute pathology . Pt presented to [facility] with episode of near syncope. She was seated on couch attempted to get up got dizzy and put her head down on trip. Pt had episodes of bradycardia. Now tx to [facility] for EPS /PPM implant today with Dr. Dowling . No acute distress noted at this time . Currently CP free no sob no lightheadedness of dizziness noted.    8/10 s/p [employer] PPM placement, DDD . Left chest wall site without swelling, bleeding.   HPI:  This is a 67 y  female with no known implantable devices COVID 19 negative unvaccinated with PMHX Diastolic Dysfunction ,  Copd/Asthma, Diabetes 2 , CVA with left sided numbness for 2 years ,OA ,hx of knee ,back and shoulder   .CT brain reveled old infarcts ,no acute pathology . Pt presented to [facility] with episode of near syncope. She was seated on couch attempted to get up got dizzy and put her head down on trip. Pt had episodes of bradycardia. Now tx to [facility] for EPS /PPM implant today with Dr. Dowling . No acute distress noted at this time . Currently CP free no sob no lightheadedness of dizziness noted.    8/10 s/p [employer] PPM placement, DDD . Left chest wall site without swelling, bleeding.    8/11: Pt sitting up in bed resting comfortably with pain controlled.  Her Left CW PPM site stable with dermabond no hematoma/bleeding.  Pt SR 67 with atrial and ventricular intermittent pacing with ventricular bigeminy on tele.    HPI:  This is a 67 y  female with no known implantable devices COVID 19 negative unvaccinated with PMHX Diastolic Dysfunction ,  Copd/Asthma, Diabetes 2 , CVA with left sided numbness for 2 years ,OA ,hx of knee ,back and shoulder   .CT brain reveled old infarcts ,no acute pathology . Pt presented to [facility] with episode of near syncope. She was seated on couch attempted to get up got dizzy and put her head down on trip. Pt had episodes of bradycardia. Now tx to [facility] for EPS /PPM implant today with Dr. Dowling . No acute distress noted at this time . Currently CP free no sob no lightheadedness of dizziness noted.    8/10 s/p [employer] PPM placement, DDD . Left chest wall site without swelling, bleeding.    8/11: Pt sitting up in bed resting comfortably with pain controlled.  Her Left CW PPM site stable with dermabond no hematoma/bleeding.  Pt SR 67 with atrial and ventricular intermittent pacing with ventricular bigeminy on tele.     8/21: stable overnight; DC home pending endo recs HPI:  This is a 67 y  female with no known implantable devices COVID 19 negative unvaccinated with PMHX Diastolic Dysfunction ,  Copd/Asthma, Diabetes 2 , CVA with left sided numbness for 2 years ,OA ,hx of knee ,back and shoulder   .CT brain reveled old infarcts ,no acute pathology . Pt presented to [facility] with episode of near syncope. She was seated on couch attempted to get up got dizzy and put her head down on trip. Pt had episodes of bradycardia. Now tx to [facility] for EPS /PPM implant today with Dr. Dowling . No acute distress noted at this time . Currently CP free no sob no lightheadedness of dizziness noted.    8/10 s/p [employer] PPM placement, DDD . Left chest wall site without swelling, bleeding.    8/11: Pt sitting up in bed resting comfortably with pain controlled.  Her Left CW PPM site stable with dermabond no hematoma/bleeding.  Pt SR 67 with atrial and ventricular intermittent pacing with ventricular bigeminy on tele.     8/21: stable overnight; patient does not want to go home on insulin; she wants to stay on her current meds and follow up with her physician

## 2022-08-11 NOTE — DISCHARGE NOTE PROVIDER - NSDCMRMEDTOKEN_GEN_ALL_CORE_FT
acetaminophen 325 mg oral tablet: 2 tab(s) orally every 6 hours, As needed, Mild Pain (1 - 3)  cephalexin 250 mg oral capsule: 1 cap(s) orally every 6 hours  glimepiride 4 mg oral tablet: 1 tab(s) orally 2 times a day  HOME /HOSPITAL   metFORMIN 1000 mg oral tablet: 1 tab(s) orally 2 times a day  Home /Hospital   predniSONE 5 mg oral tablet: 1 tab(s) orally once a day, As Needed  Home /Hospital     Ventolin HFA 90 mcg/inh inhalation aerosol: 2 puff(s) inhaled every 6 hours, As Needed - for shortness of breath and/or wheezing  Home /Hospital .   acetaminophen 325 mg oral tablet: 2 tab(s) orally every 6 hours, As needed, Mild Pain (1 - 3)  cephalexin 250 mg oral capsule: 1 cap(s) orally every 6 hours  glimepiride 4 mg oral tablet: 1 tab(s) orally 2 times a day    metFORMIN 1000 mg oral tablet: 1 tab(s) orally 2 times a day    predniSONE 5 mg oral tablet: 1 tab(s) orally once a day, As Needed    Ventolin HFA 90 mcg/inh inhalation aerosol: 2 puff(s) inhaled every 6 hours, As Needed - for shortness of breath and/or wheezing  Home /Hospital .

## 2022-08-11 NOTE — DISCHARGE NOTE PROVIDER - NSDCPNSUBOBJ_GEN_ALL_CORE
Patient is a 67y old  Female who presents with a chief complaint of         Allergies    Avelox (Seizure)  shellfish (Swelling)    Intolerances        Medications:  acetaminophen     Tablet .. 650 milliGRAM(s) Oral every 6 hours PRN  ALBUTerol    90 MICROgram(s) HFA Inhaler 2 Puff(s) Inhalation every 6 hours PRN  ceFAZolin   IVPB 2000 milliGRAM(s) IV Intermittent once  ceFAZolin   IVPB 2000 milliGRAM(s) IV Intermittent every 8 hours  cephalexin 250 milliGRAM(s) Oral every 6 hours  dextrose 5%. 1000 milliLiter(s) IV Continuous <Continuous>  dextrose 5%. 1000 milliLiter(s) IV Continuous <Continuous>  dextrose 50% Injectable 25 Gram(s) IV Push once  dextrose 50% Injectable 12.5 Gram(s) IV Push once  dextrose 50% Injectable 25 Gram(s) IV Push once  dextrose Oral Gel 15 Gram(s) Oral once PRN  glucagon  Injectable 1 milliGRAM(s) IntraMuscular once  insulin lispro (ADMELOG) corrective regimen sliding scale   SubCutaneous three times a day before meals  insulin lispro (ADMELOG) corrective regimen sliding scale   SubCutaneous at bedtime      Vitals:  T(C): 36.4 (22 @ 04:11), Max: 36.8 (08-10-22 @ 08:35)  HR: 74 (22 @ 04:11) (65 - 111)  BP: 110/67 (22 @ 04:11) (101/71 - 148/86)  BP(mean): 82 (22 @ 04:11) (75 - 99)  RR: 18 (22 @ 04:11) (15 - 19)  SpO2: 96% (22 @ 04:11) (92% - 99%)  Wt(kg): --  Daily Height in cm: 170.18 (10 Aug 2022 14:43)    Daily Weight in k.2 (10 Aug 2022 10:08)  I&O's Summary        Physical Exam:  Appearance: Normal  Eyes: PERRL, EOMI  HENT: Normal oral muscosa, NC/AT  Cardiovascular: S1S2, RRR, No M/R/G, no JVD, No Lower extremity edema  Procedural Access Site: No hematoma, Non-tender to palpation, 2+ pulse, No bruit, No Ecchymosis  Respiratory: Clear to auscultation bilaterally  Gastrointestinal: Soft, Non tender, Normal Bowel Sounds  Musculoskeletal: No clubbing, No joint deformity   Neurologic: Non-focal  Lymphatic: No lymphadenopathy  Psychiatry: AAOx3, Mood & affect appropriate  Skin: No rashes, No ecchymoses, No cyanosis        136  |  100  |  18  ----------------------------<  324<H>  4.1   |  23  |  0.75    Ca    9.0      11 Aug 2022 04:14    TPro  7.3  /  Alb  3.5  /  TBili  0.5  /  DBili  x   /  AST  13<L>  /  ALT  23  /  AlkPhos  76      PT/INR - ( 09 Aug 2022 09:35 )   PT: 11.2 sec;   INR: 0.96 ratio         PTT - ( 09 Aug 2022 09:35 )  PTT:31.8 sec      Serum Pro-Brain Natriuretic Peptide: 78 pg/mL ( @ 09:35)    Lipid panel   Hgb A1c                         13.2   9.10  )-----------( 369      ( 11 Aug 2022 04:14 )             40.6         ECG: SR 65 bpm    Electrophysiology: Monitor chest wall site for swelling, bleeding  Confirm PA/LAT chest xray results before discharge     DM type 2: Humalog insulin sliding scale with finger sticks before meals and QHS  Confirm HgbA1c level    Imaging: PA/LAT CXR on     Interpretation of Telemetry:   Patient is a 67y old  Female who presents with a chief complaint of         Allergies    Avelox (Seizure)  shellfish (Swelling)    Intolerances        Medications:  acetaminophen     Tablet .. 650 milliGRAM(s) Oral every 6 hours PRN  ALBUTerol    90 MICROgram(s) HFA Inhaler 2 Puff(s) Inhalation every 6 hours PRN  ceFAZolin   IVPB 2000 milliGRAM(s) IV Intermittent once  ceFAZolin   IVPB 2000 milliGRAM(s) IV Intermittent every 8 hours  cephalexin 250 milliGRAM(s) Oral every 6 hours  dextrose 5%. 1000 milliLiter(s) IV Continuous <Continuous>  dextrose 5%. 1000 milliLiter(s) IV Continuous <Continuous>  dextrose 50% Injectable 25 Gram(s) IV Push once  dextrose 50% Injectable 12.5 Gram(s) IV Push once  dextrose 50% Injectable 25 Gram(s) IV Push once  dextrose Oral Gel 15 Gram(s) Oral once PRN  glucagon  Injectable 1 milliGRAM(s) IntraMuscular once  insulin lispro (ADMELOG) corrective regimen sliding scale   SubCutaneous three times a day before meals  insulin lispro (ADMELOG) corrective regimen sliding scale   SubCutaneous at bedtime      Vitals:  T(C): 36.4 (22 @ 04:11), Max: 36.8 (08-10-22 @ 08:35)  HR: 74 (22 @ 04:11) (65 - 111)  BP: 110/67 (22 @ 04:11) (101/71 - 148/86)  BP(mean): 82 (22 @ 04:11) (75 - 99)  RR: 18 (22 @ 04:11) (15 - 19)  SpO2: 96% (22 @ 04:11) (92% - 99%)  Wt(kg): --  Daily Height in cm: 170.18 (10 Aug 2022 14:43)    Daily Weight in k.2 (10 Aug 2022 10:08)  I&O's Summary        Physical Exam:  Appearance: Normal  Eyes: PERRL, EOMI  HENT: Normal oral muscosa, NC/AT  Cardiovascular: S1S2, RRR, No M/R/G, no JVD, No Lower extremity edema  Procedural Access Site: No hematoma, Non-tender to palpation, 2+ pulse, No bruit, No Ecchymosis  Respiratory: Clear to auscultation bilaterally  Gastrointestinal: Soft, Non tender, Normal Bowel Sounds  Musculoskeletal: No clubbing, No joint deformity   Neurologic: Non-focal  Lymphatic: No lymphadenopathy  Psychiatry: AAOx3, Mood & affect appropriate  Skin: No rashes, No ecchymoses, No cyanosis        136  |  100  |  18  ----------------------------<  324<H>  4.1   |  23  |  0.75    Ca    9.0      11 Aug 2022 04:14    TPro  7.3  /  Alb  3.5  /  TBili  0.5  /  DBili  x   /  AST  13<L>  /  ALT  23  /  AlkPhos  76      PT/INR - ( 09 Aug 2022 09:35 )   PT: 11.2 sec;   INR: 0.96 ratio         PTT - ( 09 Aug 2022 09:35 )  PTT:31.8 sec      Serum Pro-Brain Natriuretic Peptide: 78 pg/mL ( @ 09:35)    Lipid panel   Hgb A1c                         13.2   9.10  )-----------( 369      ( 11 Aug 2022 04:14 )             40.6         ECG: SR 65 bpm    Electrophysiology: Monitor chest wall site for swelling, bleeding  Confirm PA/LAT chest xray results before discharge     DM type 2: Humalog insulin sliding scale with finger sticks before meals and QHS  Confirm HgbA1c level    Imaging: PA/LAT CXR on     Interpretation of Telemetry:            Attending Addendum: Patient seen and examined at bedside in [facility] Bed 23. s/p 2-ch [place], MRI conditional PPM on 8/10, post-OP portable CXR as well as PA and Lateral CXR performed today personally reviewed - no pneumothorax, leads in appropriate positions. Tele unremarkable other than PVCs (R bundle, left, superior axis, positive precordial concordance). Given lack of symptoms due to PVCs and preserved EF will monitor. She will follow-up in the device clinic and with Dr. Shayy Dumont's group. 1 day of oral Keflex to be given today. I reviewed the post-OP PPM instructions with the patient this morning at the bedside.    ROB Alvarado Patient is a 67y old  Female who presents with a chief complaint of         Allergies    Avelox (Seizure)  shellfish (Swelling)    Intolerances        Medications:  acetaminophen     Tablet .. 650 milliGRAM(s) Oral every 6 hours PRN  ALBUTerol    90 MICROgram(s) HFA Inhaler 2 Puff(s) Inhalation every 6 hours PRN  ceFAZolin   IVPB 2000 milliGRAM(s) IV Intermittent once  ceFAZolin   IVPB 2000 milliGRAM(s) IV Intermittent every 8 hours  cephalexin 250 milliGRAM(s) Oral every 6 hours  dextrose 5%. 1000 milliLiter(s) IV Continuous <Continuous>  dextrose 5%. 1000 milliLiter(s) IV Continuous <Continuous>  dextrose 50% Injectable 25 Gram(s) IV Push once  dextrose 50% Injectable 12.5 Gram(s) IV Push once  dextrose 50% Injectable 25 Gram(s) IV Push once  dextrose Oral Gel 15 Gram(s) Oral once PRN  glucagon  Injectable 1 milliGRAM(s) IntraMuscular once  insulin lispro (ADMELOG) corrective regimen sliding scale   SubCutaneous three times a day before meals  insulin lispro (ADMELOG) corrective regimen sliding scale   SubCutaneous at bedtime      Vitals:  T(C): 36.4 (22 @ 04:11), Max: 36.8 (08-10-22 @ 08:35)  HR: 74 (22 @ 04:11) (65 - 111)  BP: 110/67 (22 @ 04:11) (101/71 - 148/86)  BP(mean): 82 (22 @ 04:11) (75 - 99)  RR: 18 (22 @ 04:11) (15 - 19)  SpO2: 96% (22 @ 04:11) (92% - 99%)  Wt(kg): --  Daily Height in cm: 170.18 (10 Aug 2022 14:43)    Daily Weight in k.2 (10 Aug 2022 10:08)  I&O's Summary        Physical Exam:  Appearance: Normal  Eyes: PERRL, EOMI  HENT: Normal oral muscosa, NC/AT  Cardiovascular: S1S2, RRR, No M/R/G, no JVD, No Lower extremity edema  Procedural Access Site: No hematoma, Non-tender to palpation, 2+ pulse, No bruit, No Ecchymosis  Respiratory: Clear to auscultation bilaterally  Gastrointestinal: Soft, Non tender, Normal Bowel Sounds  Musculoskeletal: No clubbing, No joint deformity   Neurologic: Non-focal  Lymphatic: No lymphadenopathy  Psychiatry: AAOx3, Mood & affect appropriate  Skin: No rashes, No ecchymoses, No cyanosis        136  |  100  |  18  ----------------------------<  324<H>  4.1   |  23  |  0.75    Ca    9.0      11 Aug 2022 04:14    TPro  7.3  /  Alb  3.5  /  TBili  0.5  /  DBili  x   /  AST  13<L>  /  ALT  23  /  AlkPhos  76      PT/INR - ( 09 Aug 2022 09:35 )   PT: 11.2 sec;   INR: 0.96 ratio         PTT - ( 09 Aug 2022 09:35 )  PTT:31.8 sec      Serum Pro-Brain Natriuretic Peptide: 78 pg/mL ( @ 09:35)    Lipid panel   Hgb A1c                         13.2   9.10  )-----------( 369      ( 11 Aug 2022 04:14 )             40.6         ECG: SR 65 bpm    Electrophysiology: Monitor chest wall site for swelling, bleeding  Confirm PA/LAT chest xray results before discharge     DM type 2: Humalog insulin sliding scale with finger sticks before meals and QHS  HgbA1c level 9.8    Imaging: PA/LAT CXR on     Interpretation of Telemetry:            Attending Addendum: Patient seen and examined at bedside in [facility] Bed 23. s/p 2-ch [place], MRI conditional PPM on 8/10, post-OP portable CXR as well as PA and Lateral CXR performed today personally reviewed - no pneumothorax, leads in appropriate positions. Tele unremarkable other than PVCs (R bundle, left, superior axis, positive precordial concordance). Given lack of symptoms due to PVCs and preserved EF will monitor. She will follow-up in the device clinic and with Dr. Shayy Dumont's group. 1 day of oral Keflex to be given today. I reviewed the post-OP PPM instructions with the patient this morning at the bedside.    ROB Alvarado

## 2022-08-11 NOTE — DISCHARGE NOTE PROVIDER - NSDCCPTREATMENT_GEN_ALL_CORE_FT
PRINCIPAL PROCEDURE  Procedure: Insertion of DDD cardiac pacemaker  Findings and Treatment: [employer] PPM placed

## 2022-08-11 NOTE — CONSULT NOTE ADULT - ASSESSMENT
Yoly is a 67 year old female with Diastolic Dysfunction, Copd/Asthma, Diabetes, CVA with left sided numbness for 2 years, OA, hx of knee ,back and shoulder, who presents with syncope and bradycardia.    - symptomatic bradycardia (sss) now s/p ppm placement on 8/10  - doing well and feeling better this morning  - maintaining sr on telemetry  - echo with normal LV function  - bp acceptable  - trend creatinine and electrolytes. Keep K>4, mg>2  - ep fu  - dc planning

## 2022-08-12 ENCOUNTER — TRANSCRIPTION ENCOUNTER (OUTPATIENT)
Age: 67
End: 2022-08-12

## 2022-08-12 VITALS
HEART RATE: 70 BPM | SYSTOLIC BLOOD PRESSURE: 139 MMHG | DIASTOLIC BLOOD PRESSURE: 58 MMHG | TEMPERATURE: 98 F | OXYGEN SATURATION: 97 % | RESPIRATION RATE: 19 BRPM

## 2022-08-12 PROCEDURE — C1769: CPT

## 2022-08-12 PROCEDURE — C1785: CPT

## 2022-08-12 PROCEDURE — 85025 COMPLETE CBC W/AUTO DIFF WBC: CPT

## 2022-08-12 PROCEDURE — C1887: CPT

## 2022-08-12 PROCEDURE — 71045 X-RAY EXAM CHEST 1 VIEW: CPT

## 2022-08-12 PROCEDURE — 99232 SBSQ HOSP IP/OBS MODERATE 35: CPT

## 2022-08-12 PROCEDURE — C1892: CPT

## 2022-08-12 PROCEDURE — 93306 TTE W/DOPPLER COMPLETE: CPT

## 2022-08-12 PROCEDURE — 80048 BASIC METABOLIC PNL TOTAL CA: CPT

## 2022-08-12 PROCEDURE — 83735 ASSAY OF MAGNESIUM: CPT

## 2022-08-12 PROCEDURE — 93005 ELECTROCARDIOGRAM TRACING: CPT

## 2022-08-12 PROCEDURE — 82962 GLUCOSE BLOOD TEST: CPT

## 2022-08-12 PROCEDURE — 86901 BLOOD TYPING SEROLOGIC RH(D): CPT

## 2022-08-12 PROCEDURE — 86850 RBC ANTIBODY SCREEN: CPT

## 2022-08-12 PROCEDURE — 33208 INSRT HEART PM ATRIAL & VENT: CPT

## 2022-08-12 PROCEDURE — 83036 HEMOGLOBIN GLYCOSYLATED A1C: CPT

## 2022-08-12 PROCEDURE — 94640 AIRWAY INHALATION TREATMENT: CPT

## 2022-08-12 PROCEDURE — 71046 X-RAY EXAM CHEST 2 VIEWS: CPT

## 2022-08-12 PROCEDURE — 86900 BLOOD TYPING SEROLOGIC ABO: CPT

## 2022-08-12 PROCEDURE — C1898: CPT

## 2022-08-12 RX ORDER — GLIMEPIRIDE 1 MG
1 TABLET ORAL
Qty: 0 | Refills: 0 | DISCHARGE

## 2022-08-12 RX ADMIN — Medication 650 MILLIGRAM(S): at 00:58

## 2022-08-12 RX ADMIN — Medication 250 MILLIGRAM(S): at 11:48

## 2022-08-12 RX ADMIN — Medication 650 MILLIGRAM(S): at 01:28

## 2022-08-12 RX ADMIN — Medication 650 MILLIGRAM(S): at 12:21

## 2022-08-12 RX ADMIN — Medication 3: at 08:43

## 2022-08-12 RX ADMIN — Medication 250 MILLIGRAM(S): at 05:04

## 2022-08-12 NOTE — DISCHARGE NOTE NURSING/CASE MANAGEMENT/SOCIAL WORK - NSDCPEFALRISK_GEN_ALL_CORE
For information on Fall & Injury Prevention, visit: [email or web address] OR  [email or web address] OR  https://www.cdc.gov/steadi/patient.html

## 2022-08-12 NOTE — DISCHARGE NOTE NURSING/CASE MANAGEMENT/SOCIAL WORK - NSTRANSFERDENTURES_GEN_A_NUR
Situation: Routine follow up call with patient.       Key Assessments:  Patient has been feeling \"really\" great lately. She shares that she has been able to use the stairs in the apartment complex again. She has been able to go around the apartment in the morning and evening to lock/unlock the laundry rooms as she did before.     She shares that she has been enjoying looking in the mirror and seeing how she has changed. She also shared that people have been telling her she is \"glowing\" and this makes her feel great.     She denies any shortness of breath, weight gain, or swelling at this time.     Patient reports that she has been monitoring her salt intake. She has also been trying to eat more regular meals. She shares that she enjoys cooking in her air fryer.     Actions Taken:Patient reports that she is out of her Torsemide and it is filled at the [facility] pharmacy. She is unable to get there. RNCC called [facility] and [facility] to assist patient in having all medications filled at [facility]. Patient will be able to  medications after 7:00pm tonight.     RNCC reviewed patient goals.     Plan:RNCC will follow up with patient in 2 weeks.       See hyperlinks within encounter for full documentation   full/upper

## 2022-08-12 NOTE — PROGRESS NOTE ADULT - SUBJECTIVE AND OBJECTIVE BOX
[facility] - Nahid Greene, Sara, Tim, Caryn Sandoval  Office Number:  [phone]    Patient resting comfortably in bed in NAD.  Laying flat with no respiratory distress.  No complaints of chest pain, dyspnea, palpitations, PND, or orthopnea.    F/U for:  Symptomatic bradycardia    Telemetry:  SR    MEDICATIONS  (STANDING):  ceFAZolin   IVPB 2000 milliGRAM(s) IV Intermittent once  cephalexin 250 milliGRAM(s) Oral every 6 hours  dextrose 5%. 1000 milliLiter(s) (100 mL/Hr) IV Continuous <Continuous>  dextrose 5%. 1000 milliLiter(s) (50 mL/Hr) IV Continuous <Continuous>  dextrose 50% Injectable 25 Gram(s) IV Push once  dextrose 50% Injectable 12.5 Gram(s) IV Push once  dextrose 50% Injectable 25 Gram(s) IV Push once  glucagon  Injectable 1 milliGRAM(s) IntraMuscular once  insulin lispro (ADMELOG) corrective regimen sliding scale   SubCutaneous three times a day before meals  insulin lispro (ADMELOG) corrective regimen sliding scale   SubCutaneous at bedtime    MEDICATIONS  (PRN):  acetaminophen     Tablet .. 650 milliGRAM(s) Oral every 6 hours PRN Mild Pain (1 - 3)  ALBUTerol    90 MICROgram(s) HFA Inhaler 2 Puff(s) Inhalation every 6 hours PRN Shortness of Breath and/or Wheezing  dextrose Oral Gel 15 Gram(s) Oral once PRN Blood Glucose LESS THAN 70 milliGRAM(s)/deciliter      Allergies    Avelox (Seizure)  shellfish (Swelling)             Vital Signs Last 24 Hrs  T(C): 36.3 (12 Aug 2022 05:02), Max: 36.6 (11 Aug 2022 20:30)  T(F): 97.4 (12 Aug 2022 05:02), Max: 97.8 (11 Aug 2022 20:30)  HR: 82 (12 Aug 2022 05:02) (61 - 82)  BP: 111/79 (12 Aug 2022 05:02) (111/79 - 125/55)  BP(mean): 90 (12 Aug 2022 05:02) (77 - 90)  RR: 17 (12 Aug 2022 05:02) (17 - 18)  SpO2: 98% (12 Aug 2022 05:02) (97% - 98%)    Parameters below as of 12 Aug 2022 05:02  Patient On (Oxygen Delivery Method): room air        I&O's Summary    11 Aug 2022 07:01  -  12 Aug 2022 07:00  --------------------------------------------------------  IN: 180 mL / OUT: 0 mL / NET: 180 mL        ON EXAM:    Constitutional: NAD, alert and oriented x 3  Lungs:  Non-labored, breath sounds are clear bilaterally, No wheezing, rales or rhonchi  Cardiovascular: RRR.  S1 and S2 positive.  No murmurs, rubs, gallops or clicks  Gastrointestinal: Bowel Sounds present, soft, nontender.   Lymph: No peripheral edema. No cervical lymphadenopathy.  Neurological: Alert, no focal deficits  Skin: No rashes or ulcers   Psych:  Mood & affect appropriate.  LABS: All Labs Reviewed:                        13.2   9.10  )-----------( 369      ( 11 Aug 2022 04:14 )             40.6                         13.1   9.94  )-----------( 407      ( 09 Aug 2022 09:35 )             41.7     11 Aug 2022 04:14    136    |  100    |  18     ----------------------------<  324    4.1     |  23     |  0.75   09 Aug 2022 09:35    137    |  105    |  14     ----------------------------<  264    4.6     |  28     |  0.78     Ca    9.0        11 Aug 2022 04:14  Ca    9.1        09 Aug 2022 09:35  Mg     1.8       11 Aug 2022 04:14    TPro  7.3    /  Alb  3.5    /  TBili  0.5    /  DBili  x      /  AST  13     /  ALT  23     /  AlkPhos  76     09 Aug 2022 09:35          Blood Culture:   08-09 @ 09:35  Pro Bnp 78

## 2022-08-12 NOTE — DISCHARGE NOTE NURSING/CASE MANAGEMENT/SOCIAL WORK - NSDCVIVACCINE_GEN_ALL_CORE_FT
Tdap; 20-Jun-2022 22:39; Angel Stinson (KELL); [employer]; [id number] (Exp. Date: 18-Jan-2024); IntraMuscular; Deltoid Left.; 0.5 milliLiter(s); VIS (VIS Published: 09-May-2013, VIS Presented: 20-Jun-2022);

## 2022-08-12 NOTE — DISCHARGE NOTE NURSING/CASE MANAGEMENT/SOCIAL WORK - PATIENT PORTAL LINK FT
You can access the FollowMyHealth Patient Portal offered by [facility] by registering at the following website: [email or web address]. By joining [facility]’s FollowMyHealth portal, you will also be able to view your health information using other applications (apps) compatible with our system.

## 2022-08-15 ENCOUNTER — NON-APPOINTMENT (OUTPATIENT)
Age: 67
End: 2022-08-15

## 2022-08-25 ENCOUNTER — NON-APPOINTMENT (OUTPATIENT)
Age: 67
End: 2022-08-25

## 2022-08-25 ENCOUNTER — APPOINTMENT (OUTPATIENT)
Dept: ELECTROPHYSIOLOGY | Facility: CLINIC | Age: 67
End: 2022-08-25

## 2022-08-25 ENCOUNTER — RX RENEWAL (OUTPATIENT)
Age: 67
End: 2022-08-25

## 2022-08-25 VITALS
HEIGHT: 67 IN | OXYGEN SATURATION: 96 % | HEART RATE: 46 BPM | DIASTOLIC BLOOD PRESSURE: 62 MMHG | SYSTOLIC BLOOD PRESSURE: 105 MMHG | WEIGHT: 133 LBS | BODY MASS INDEX: 20.88 KG/M2

## 2022-08-25 PROCEDURE — 93280 PM DEVICE PROGR EVAL DUAL: CPT

## 2022-08-25 PROCEDURE — 99024 POSTOP FOLLOW-UP VISIT: CPT

## 2022-08-25 PROCEDURE — 93000 ELECTROCARDIOGRAM COMPLETE: CPT | Mod: 59

## 2022-09-06 PROCEDURE — 93244 EXT ECG>48HR<7D REV&INTERPJ: CPT

## 2022-09-23 ENCOUNTER — NON-APPOINTMENT (OUTPATIENT)
Age: 67
End: 2022-09-23

## 2022-09-23 ENCOUNTER — APPOINTMENT (OUTPATIENT)
Dept: ELECTROPHYSIOLOGY | Facility: CLINIC | Age: 67
End: 2022-09-23

## 2022-09-23 VITALS
HEIGHT: 67 IN | OXYGEN SATURATION: 96 % | HEART RATE: 77 BPM | BODY MASS INDEX: 22.91 KG/M2 | DIASTOLIC BLOOD PRESSURE: 71 MMHG | SYSTOLIC BLOOD PRESSURE: 117 MMHG | WEIGHT: 146 LBS

## 2022-09-23 PROCEDURE — 93000 ELECTROCARDIOGRAM COMPLETE: CPT

## 2022-09-23 PROCEDURE — 99213 OFFICE O/P EST LOW 20 MIN: CPT | Mod: 24

## 2022-09-23 NOTE — PHYSICAL EXAM
[Well Developed] : well developed [Well Nourished] : well nourished [No Acute Distress] : no acute distress [Normal Venous Pressure] : normal venous pressure [No Carotid Bruit] : no carotid bruit [Normal S1, S2] : normal S1, S2 [No Murmur] : no murmur [No Rub] : no rub [No Gallop] : no gallop [Clear Lung Fields] : clear lung fields [Good Air Entry] : good air entry [No Respiratory Distress] : no respiratory distress  [Soft] : abdomen soft [Non Tender] : non-tender [No Masses/organomegaly] : no masses/organomegaly [Normal Gait] : normal gait [No Edema] : no edema [No Cyanosis] : no cyanosis [No Rash] : no rash [No Skin Lesions] : no skin lesions [Moves all extremities] : moves all extremities [No Focal Deficits] : no focal deficits [Normal Speech] : normal speech [Alert and Oriented] : alert and oriented [Normal memory] : normal memory [de-identified] : I removed Dermabond from her PPM site - the incision has healed well, no hematoma

## 2022-09-23 NOTE — HISTORY OF PRESENT ILLNESS
[FreeTextEntry1] : Yoly Garcia is a 67-year-old woman with a osteoarthritis, asthma, possible COPD, DM2 and a prior CVA.\par She was transferred from [facility] in August of 2022 for symptomatic junctional bradycardia at 30 beats per minute. She received a dual chamber [employer] pacemaker on 8/10/2022 for symptomatic sinus node dysfunction. She was admitted to [facility] subsequently for dizziness and effective bradycardia due to PVCs. RHYTHMIQ was turned off at [place]. She presents to the office today for follow-up. \par \par She underwent outpatient rhythm monitoring using a Zio XT from 8/25/2022 - 8/29/2022. Brief episodes of AF were detected (<1% burden,  longest episode lasted 4 mins 56 secs) along with a PVC burden of 12.6%. A transthoracic echocardiogram performed on 8/10/2022 demonstrated a left ventricular ejection fraction of 68%.\par \par The patient reports she has been feeling well recently. She denies palpitations, dizziness, chest pain, shortness of breath. She cleans the house daily and watches her granddaughter (age 1) for an hour during the week. She also watches her three grandsons (ages 6, 8, 11) on the weekend. She does not feel fatigued after watching her grandchildren..\par \par On her device interrogation in the office today revealed AP: 10%,  < 1%, stable sensing, impedance and pacing thresholds, AF: 2.4 mins, 371.1 K PVCs (from 8/25/2022 - 9/23/2022), infrequent SBR events due to PVCs no concerns

## 2022-09-23 NOTE — DISCUSSION/SUMMARY
[FreeTextEntry4] : PVCs [FreeTextEntry1] : Yoly Garcia is a 67-year-old woman with a osteoarthritis, asthma, possible COPD, DM2 and a prior CVA. She was transferred from [facility] in August of 2022 for symptomatic junctional bradycardia at 30 beats per minute. She received a dual chamber [employer] pacemaker on 8/10/2022 for symptomatic sinus node dysfunction. She presents to the office today for follow-up to discuss the management of PVCs. Her PVC burden was 12.6% in 8/2022. Her TTE from 8/10/2022 demonstrated a left ventricular ejection fraction of 68%.\par \par Given her absence of symptoms and preserved LVSF, there is no indication for further treatment of her PVCs at this time. She is maintained on Toprol XL 50mg p.o. daily. We discussed the indications for escalation of therapy with antiarrhythmic medications and or catheter ablation - namely symptoms or a decline in left ventricular systolic function. We agreed that she should have a repeat TTE in 6 months and again at 1 year. She will monitor herself for symptoms and will continue to follow with the Device Clinic and with Dr. Shayy Dumont. [EKG obtained to assist in diagnosis and management of assessed problem(s)] : EKG obtained to assist in diagnosis and management of assessed problem(s)

## 2022-09-23 NOTE — CARDIOLOGY SUMMARY
[de-identified] : ECG from this visit: Atrial pacing, PVCs - R bundle, left superior axis, V6 transition\par \par ECG from 8/25/2022: Sinus with native QRS (QRSd: 102ms), PVC morphology - R bundle, left, superior axis, positive precordial concordance\par \par ECG from 8/10/2022: Sinus at 65 with VT: 158ms, QRSd: 94ms, PVCs (R bundle, left, superior axis, positive precordial concordance) \par  [de-identified] : \donna Zamudio XT from 8/25/2022 - 8/29/2022: min HR: 59bpm, max HR: 191bpm, mean HR: 76bpm, PVC burden 12.6% The predominant underlying rhythm was atrial pacing. AF occurred (<1% burden), ranging from 130-191 bpm (avg of 161 bpm), the longest episode lasted 4 mins 56 secs with an average rate of 161 bpm. Isolated SVEs were rare (<1.0%, 47), and no SVE Couplets or SVE Triplets were present. VE Couplets were rare (<1.0%, 468), and no VE Triplets were present. Ventricular Bigeminy and Trigeminy were present. The patient did not provide the date and time for her two symptom triggers and therefore no correlation could be made with the patient's rhythm at the time of these events. [de-identified] : \par TTE from 8/10/2022: EF: 68%, normal LA, normal diastolic function, RA and RV not well visualized, no TR, no pericardial effusion\par

## 2022-10-06 NOTE — ED ADULT NURSE NOTE - TEMPLATE LIST FOR HEAD TO TOE ASSESSMENT
----- Message from Gregoria Casey MD sent at 10/6/2022  1:34 AM CDT -----  Please notify of normal results. Repeat Pap smear in 5 years.   Neuro

## 2023-01-16 NOTE — H&P ADULT - NSHPLABSRESULTS_GEN_ALL_CORE
show
< from: CT Head No Cont (11.26.18 @ 20:59) >    EXAM:  CT BRAIN                            PROCEDURE DATE:  11/26/2018          INTERPRETATION:  CLINICAL INFORMATION: Left-sided numbness and headaches   today.    TECHNIQUE:  Axial CT images were acquired through the head.  Intravenous contrast: None  Two-dimensional reformats were generated.    COMPARISON STUDY: MRI from 10/21/2011.  Head CT from 12/21/2011.      FINDINGS:   There is no CT evidence of acute intracranial hemorrhage, mass effect,   midline shift or acute territorial infarct. The basal cisterns are patent.    There is mild generalized cerebral volume loss and mild periventricular   white matter hypoattenuation compatible chronic microvascular ischemic   disease.  Two small chronic transcortical infarcts in the right frontal  lobe (best seen on sagittal images, 601:14-15 and 601:11-12) and a small   chronic transcortical infarct in the right insula (2:12).  The visualized paranasal sinuses are clear.    The mastoid air cells and middle ear cavities are clear.    The calvarium, skull base, and extracranial soft tissues are unremarkable.    IMPRESSION:   No CT evidence of acute intracranial hemorrhage, mass effect or acute   territorial infarct.  There are several small chronic appearing infarcts   in the right middlecerebral artery vascular territory and chronic   microvascular ischemic disease.

## 2023-01-18 NOTE — ED ADULT TRIAGE NOTE - SPO2 (%)
97 Erythromycin Pregnancy And Lactation Text: This medication is Pregnancy Category B and is considered safe during pregnancy. It is also excreted in breast milk.

## 2023-01-25 DIAGNOSIS — Z82.49 FAMILY HISTORY OF ISCHEMIC HEART DISEASE AND OTHER DISEASES OF THE CIRCULATORY SYSTEM: ICD-10-CM

## 2023-01-25 DIAGNOSIS — Z87.09 PERSONAL HISTORY OF OTHER DISEASES OF THE RESPIRATORY SYSTEM: ICD-10-CM

## 2023-01-25 DIAGNOSIS — F17.200 NICOTINE DEPENDENCE, UNSPECIFIED, UNCOMPLICATED: ICD-10-CM

## 2023-01-25 DIAGNOSIS — Z78.9 OTHER SPECIFIED HEALTH STATUS: ICD-10-CM

## 2023-01-25 DIAGNOSIS — Z95.0 PRESENCE OF CARDIAC PACEMAKER: ICD-10-CM

## 2023-01-25 RX ORDER — GLIMEPIRIDE 2 MG/1
2 TABLET ORAL
Refills: 0 | Status: ACTIVE | COMMUNITY

## 2023-01-25 RX ORDER — METFORMIN HYDROCHLORIDE 500 MG/1
500 TABLET, COATED ORAL
Qty: 360 | Refills: 1 | Status: ACTIVE | COMMUNITY

## 2023-01-25 RX ORDER — METOPROLOL SUCCINATE 50 MG/1
50 TABLET, EXTENDED RELEASE ORAL DAILY
Qty: 90 | Refills: 0 | Status: DISCONTINUED | COMMUNITY
Start: 2022-08-25 | End: 2023-01-25

## 2023-01-26 ENCOUNTER — APPOINTMENT (OUTPATIENT)
Dept: CARDIOLOGY | Facility: CLINIC | Age: 68
End: 2023-01-26
Payer: MEDICARE

## 2023-01-26 ENCOUNTER — RX RENEWAL (OUTPATIENT)
Age: 68
End: 2023-01-26

## 2023-01-26 ENCOUNTER — NON-APPOINTMENT (OUTPATIENT)
Age: 68
End: 2023-01-26

## 2023-01-26 VITALS
SYSTOLIC BLOOD PRESSURE: 144 MMHG | HEART RATE: 61 BPM | OXYGEN SATURATION: 95 % | WEIGHT: 145 LBS | DIASTOLIC BLOOD PRESSURE: 76 MMHG | BODY MASS INDEX: 22.76 KG/M2 | HEIGHT: 67 IN

## 2023-01-26 PROCEDURE — 99215 OFFICE O/P EST HI 40 MIN: CPT

## 2023-01-26 PROCEDURE — 93000 ELECTROCARDIOGRAM COMPLETE: CPT

## 2023-02-16 ENCOUNTER — RX RENEWAL (OUTPATIENT)
Age: 68
End: 2023-02-16

## 2023-03-08 ENCOUNTER — NON-APPOINTMENT (OUTPATIENT)
Age: 68
End: 2023-03-08

## 2023-03-08 ENCOUNTER — APPOINTMENT (OUTPATIENT)
Dept: CARDIOLOGY | Facility: CLINIC | Age: 68
End: 2023-03-08
Payer: MEDICARE

## 2023-03-08 PROCEDURE — 93280 PM DEVICE PROGR EVAL DUAL: CPT

## 2023-03-20 ENCOUNTER — APPOINTMENT (OUTPATIENT)
Dept: CARDIOLOGY | Facility: CLINIC | Age: 68
End: 2023-03-20

## 2023-05-03 ENCOUNTER — NON-APPOINTMENT (OUTPATIENT)
Age: 68
End: 2023-05-03

## 2023-05-03 ENCOUNTER — APPOINTMENT (OUTPATIENT)
Dept: CARDIOLOGY | Facility: CLINIC | Age: 68
End: 2023-05-03
Payer: MEDICARE

## 2023-05-03 PROCEDURE — 93294 REM INTERROG EVL PM/LDLS PM: CPT

## 2023-05-03 PROCEDURE — 93296 REM INTERROG EVL PM/IDS: CPT

## 2023-07-10 ENCOUNTER — APPOINTMENT (OUTPATIENT)
Dept: CARDIOLOGY | Facility: CLINIC | Age: 68
End: 2023-07-10
Payer: MEDICARE

## 2023-07-10 ENCOUNTER — NON-APPOINTMENT (OUTPATIENT)
Age: 68
End: 2023-07-10

## 2023-07-10 VITALS
BODY MASS INDEX: 21.82 KG/M2 | OXYGEN SATURATION: 98 % | HEIGHT: 67 IN | DIASTOLIC BLOOD PRESSURE: 81 MMHG | SYSTOLIC BLOOD PRESSURE: 126 MMHG | HEART RATE: 65 BPM | WEIGHT: 139 LBS

## 2023-07-10 DIAGNOSIS — I49.3 VENTRICULAR PREMATURE DEPOLARIZATION: ICD-10-CM

## 2023-07-10 PROCEDURE — 99214 OFFICE O/P EST MOD 30 MIN: CPT

## 2023-07-10 PROCEDURE — 93000 ELECTROCARDIOGRAM COMPLETE: CPT

## 2023-07-10 RX ORDER — METOPROLOL SUCCINATE 25 MG/1
25 TABLET, EXTENDED RELEASE ORAL
Qty: 90 | Refills: 3 | Status: ACTIVE | COMMUNITY
Start: 2023-01-26 | End: 1900-01-01

## 2023-07-12 ENCOUNTER — APPOINTMENT (OUTPATIENT)
Dept: CARDIOLOGY | Facility: CLINIC | Age: 68
End: 2023-07-12
Payer: MEDICARE

## 2023-07-12 PROCEDURE — 93280 PM DEVICE PROGR EVAL DUAL: CPT

## 2023-07-21 ENCOUNTER — EMERGENCY (EMERGENCY)
Facility: HOSPITAL | Age: 68
LOS: 1 days | Discharge: HOME | End: 2023-07-21
Attending: STUDENT IN AN ORGANIZED HEALTH CARE EDUCATION/TRAINING PROGRAM | Admitting: STUDENT IN AN ORGANIZED HEALTH CARE EDUCATION/TRAINING PROGRAM
Payer: MEDICARE

## 2023-07-21 VITALS
OXYGEN SATURATION: 98 % | RESPIRATION RATE: 15 BRPM | SYSTOLIC BLOOD PRESSURE: 130 MMHG | HEART RATE: 64 BPM | TEMPERATURE: 98 F | DIASTOLIC BLOOD PRESSURE: 81 MMHG

## 2023-07-21 VITALS — WEIGHT: 138.89 LBS | HEIGHT: 67 IN

## 2023-07-21 DIAGNOSIS — Z98.89 OTHER SPECIFIED POSTPROCEDURAL STATES: Chronic | ICD-10-CM

## 2023-07-21 DIAGNOSIS — Z90.49 ACQUIRED ABSENCE OF OTHER SPECIFIED PARTS OF DIGESTIVE TRACT: Chronic | ICD-10-CM

## 2023-07-21 LAB
ALBUMIN SERPL ELPH-MCNC: 3.5 G/DL — SIGNIFICANT CHANGE UP (ref 3.3–5)
ALP SERPL-CCNC: 66 U/L — SIGNIFICANT CHANGE UP (ref 40–120)
ALT FLD-CCNC: 29 U/L — SIGNIFICANT CHANGE UP (ref 12–78)
ANION GAP SERPL CALC-SCNC: 1 MMOL/L — LOW (ref 5–17)
APTT BLD: 30.5 SEC — SIGNIFICANT CHANGE UP (ref 27.5–35.5)
AST SERPL-CCNC: 14 U/L — LOW (ref 15–37)
BASOPHILS # BLD AUTO: 0.1 K/UL — SIGNIFICANT CHANGE UP (ref 0–0.2)
BASOPHILS NFR BLD AUTO: 1.3 % — SIGNIFICANT CHANGE UP (ref 0–2)
BILIRUB SERPL-MCNC: 0.2 MG/DL — SIGNIFICANT CHANGE UP (ref 0.2–1.2)
BUN SERPL-MCNC: 19 MG/DL — SIGNIFICANT CHANGE UP (ref 7–23)
CALCIUM SERPL-MCNC: 9.3 MG/DL — SIGNIFICANT CHANGE UP (ref 8.5–10.1)
CHLORIDE SERPL-SCNC: 106 MMOL/L — SIGNIFICANT CHANGE UP (ref 96–108)
CK MB BLD-MCNC: 1.7 % — SIGNIFICANT CHANGE UP (ref 0–3.5)
CK MB CFR SERPL CALC: 1.3 NG/ML — SIGNIFICANT CHANGE UP (ref 0–3.6)
CK SERPL-CCNC: 77 U/L — SIGNIFICANT CHANGE UP (ref 26–192)
CO2 SERPL-SCNC: 30 MMOL/L — SIGNIFICANT CHANGE UP (ref 22–31)
CREAT SERPL-MCNC: 1.1 MG/DL — SIGNIFICANT CHANGE UP (ref 0.5–1.3)
EGFR: 54 ML/MIN/1.73M2 — LOW
EOSINOPHIL # BLD AUTO: 0.22 K/UL — SIGNIFICANT CHANGE UP (ref 0–0.5)
EOSINOPHIL NFR BLD AUTO: 2.8 % — SIGNIFICANT CHANGE UP (ref 0–6)
GLUCOSE SERPL-MCNC: 327 MG/DL — HIGH (ref 70–99)
HCT VFR BLD CALC: 40.1 % — SIGNIFICANT CHANGE UP (ref 34.5–45)
HGB BLD-MCNC: 12.6 G/DL — SIGNIFICANT CHANGE UP (ref 11.5–15.5)
IMM GRANULOCYTES NFR BLD AUTO: 0.4 % — SIGNIFICANT CHANGE UP (ref 0–0.9)
INR BLD: 0.95 RATIO — SIGNIFICANT CHANGE UP (ref 0.88–1.16)
LACTATE SERPL-SCNC: 1.3 MMOL/L — SIGNIFICANT CHANGE UP (ref 0.7–2)
LIDOCAIN IGE QN: 95 U/L — SIGNIFICANT CHANGE UP (ref 73–393)
LYMPHOCYTES # BLD AUTO: 1.71 K/UL — SIGNIFICANT CHANGE UP (ref 1–3.3)
LYMPHOCYTES # BLD AUTO: 21.8 % — SIGNIFICANT CHANGE UP (ref 13–44)
MAGNESIUM SERPL-MCNC: 2.1 MG/DL — SIGNIFICANT CHANGE UP (ref 1.6–2.6)
MCHC RBC-ENTMCNC: 27.3 PG — SIGNIFICANT CHANGE UP (ref 27–34)
MCHC RBC-ENTMCNC: 31.4 GM/DL — LOW (ref 32–36)
MCV RBC AUTO: 86.8 FL — SIGNIFICANT CHANGE UP (ref 80–100)
MONOCYTES # BLD AUTO: 0.37 K/UL — SIGNIFICANT CHANGE UP (ref 0–0.9)
MONOCYTES NFR BLD AUTO: 4.7 % — SIGNIFICANT CHANGE UP (ref 2–14)
NEUTROPHILS # BLD AUTO: 5.42 K/UL — SIGNIFICANT CHANGE UP (ref 1.8–7.4)
NEUTROPHILS NFR BLD AUTO: 69 % — SIGNIFICANT CHANGE UP (ref 43–77)
NRBC # BLD: 0 /100 WBCS — SIGNIFICANT CHANGE UP (ref 0–0)
NT-PROBNP SERPL-SCNC: 66 PG/ML — SIGNIFICANT CHANGE UP (ref 0–125)
PLATELET # BLD AUTO: 289 K/UL — SIGNIFICANT CHANGE UP (ref 150–400)
POTASSIUM SERPL-MCNC: 4.4 MMOL/L — SIGNIFICANT CHANGE UP (ref 3.5–5.3)
POTASSIUM SERPL-SCNC: 4.4 MMOL/L — SIGNIFICANT CHANGE UP (ref 3.5–5.3)
PROT SERPL-MCNC: 6.8 G/DL — SIGNIFICANT CHANGE UP (ref 6–8.3)
PROTHROM AB SERPL-ACNC: 11.1 SEC — SIGNIFICANT CHANGE UP (ref 10.5–13.4)
RBC # BLD: 4.62 M/UL — SIGNIFICANT CHANGE UP (ref 3.8–5.2)
RBC # FLD: 12.7 % — SIGNIFICANT CHANGE UP (ref 10.3–14.5)
SODIUM SERPL-SCNC: 137 MMOL/L — SIGNIFICANT CHANGE UP (ref 135–145)
TROPONIN I, HIGH SENSITIVITY RESULT: 27.5 NG/L — SIGNIFICANT CHANGE UP
TROPONIN I, HIGH SENSITIVITY RESULT: 38.3 NG/L — SIGNIFICANT CHANGE UP
TSH SERPL-MCNC: 0.45 UIU/ML — SIGNIFICANT CHANGE UP (ref 0.36–3.74)
WBC # BLD: 7.85 K/UL — SIGNIFICANT CHANGE UP (ref 3.8–10.5)
WBC # FLD AUTO: 7.85 K/UL — SIGNIFICANT CHANGE UP (ref 3.8–10.5)

## 2023-07-21 PROCEDURE — 82550 ASSAY OF CK (CPK): CPT

## 2023-07-21 PROCEDURE — 84443 ASSAY THYROID STIM HORMONE: CPT

## 2023-07-21 PROCEDURE — 82553 CREATINE MB FRACTION: CPT

## 2023-07-21 PROCEDURE — 99285 EMERGENCY DEPT VISIT HI MDM: CPT

## 2023-07-21 PROCEDURE — 83880 ASSAY OF NATRIURETIC PEPTIDE: CPT

## 2023-07-21 PROCEDURE — 71046 X-RAY EXAM CHEST 2 VIEWS: CPT

## 2023-07-21 PROCEDURE — 71275 CT ANGIOGRAPHY CHEST: CPT | Mod: QQ

## 2023-07-21 PROCEDURE — 99285 EMERGENCY DEPT VISIT HI MDM: CPT | Mod: 25

## 2023-07-21 PROCEDURE — 74174 CTA ABD&PLVS W/CONTRAST: CPT | Mod: 26,QQ

## 2023-07-21 PROCEDURE — 36415 COLL VENOUS BLD VENIPUNCTURE: CPT

## 2023-07-21 PROCEDURE — 83690 ASSAY OF LIPASE: CPT

## 2023-07-21 PROCEDURE — 82803 BLOOD GASES ANY COMBINATION: CPT

## 2023-07-21 PROCEDURE — 83605 ASSAY OF LACTIC ACID: CPT

## 2023-07-21 PROCEDURE — 85610 PROTHROMBIN TIME: CPT

## 2023-07-21 PROCEDURE — 96374 THER/PROPH/DIAG INJ IV PUSH: CPT | Mod: XU

## 2023-07-21 PROCEDURE — 85730 THROMBOPLASTIN TIME PARTIAL: CPT

## 2023-07-21 PROCEDURE — 83735 ASSAY OF MAGNESIUM: CPT

## 2023-07-21 PROCEDURE — 74174 CTA ABD&PLVS W/CONTRAST: CPT | Mod: QQ

## 2023-07-21 PROCEDURE — 82010 KETONE BODYS QUAN: CPT

## 2023-07-21 PROCEDURE — 71046 X-RAY EXAM CHEST 2 VIEWS: CPT | Mod: 26

## 2023-07-21 PROCEDURE — 93005 ELECTROCARDIOGRAM TRACING: CPT

## 2023-07-21 PROCEDURE — 85025 COMPLETE CBC W/AUTO DIFF WBC: CPT

## 2023-07-21 PROCEDURE — 80053 COMPREHEN METABOLIC PANEL: CPT

## 2023-07-21 PROCEDURE — 93010 ELECTROCARDIOGRAM REPORT: CPT

## 2023-07-21 PROCEDURE — 84484 ASSAY OF TROPONIN QUANT: CPT

## 2023-07-21 PROCEDURE — 71275 CT ANGIOGRAPHY CHEST: CPT | Mod: 26,QQ

## 2023-07-21 PROCEDURE — 96375 TX/PRO/DX INJ NEW DRUG ADDON: CPT | Mod: XU

## 2023-07-21 RX ORDER — LIDOCAINE 4 G/100G
1 CREAM TOPICAL ONCE
Refills: 0 | Status: COMPLETED | OUTPATIENT
Start: 2023-07-21 | End: 2023-07-21

## 2023-07-21 RX ORDER — ONDANSETRON 8 MG/1
4 TABLET, FILM COATED ORAL ONCE
Refills: 0 | Status: COMPLETED | OUTPATIENT
Start: 2023-07-21 | End: 2023-07-21

## 2023-07-21 RX ORDER — SODIUM CHLORIDE 9 MG/ML
500 INJECTION INTRAMUSCULAR; INTRAVENOUS; SUBCUTANEOUS ONCE
Refills: 0 | Status: COMPLETED | OUTPATIENT
Start: 2023-07-21 | End: 2023-07-21

## 2023-07-21 RX ORDER — FAMOTIDINE 10 MG/ML
20 INJECTION INTRAVENOUS ONCE
Refills: 0 | Status: COMPLETED | OUTPATIENT
Start: 2023-07-21 | End: 2023-07-21

## 2023-07-21 RX ORDER — ACETAMINOPHEN 500 MG
975 TABLET ORAL ONCE
Refills: 0 | Status: COMPLETED | OUTPATIENT
Start: 2023-07-21 | End: 2023-07-21

## 2023-07-21 RX ORDER — CYCLOBENZAPRINE HYDROCHLORIDE 10 MG/1
1 TABLET, FILM COATED ORAL
Qty: 12 | Refills: 0
Start: 2023-07-21 | End: 2023-07-24

## 2023-07-21 RX ORDER — KETOROLAC TROMETHAMINE 30 MG/ML
15 SYRINGE (ML) INJECTION ONCE
Refills: 0 | Status: DISCONTINUED | OUTPATIENT
Start: 2023-07-21 | End: 2023-07-21

## 2023-07-21 RX ADMIN — SODIUM CHLORIDE 500 MILLILITER(S): 9 INJECTION INTRAMUSCULAR; INTRAVENOUS; SUBCUTANEOUS at 16:35

## 2023-07-21 RX ADMIN — FAMOTIDINE 20 MILLIGRAM(S): 10 INJECTION INTRAVENOUS at 16:34

## 2023-07-21 RX ADMIN — Medication 975 MILLIGRAM(S): at 16:19

## 2023-07-21 RX ADMIN — ONDANSETRON 4 MILLIGRAM(S): 8 TABLET, FILM COATED ORAL at 16:35

## 2023-07-21 RX ADMIN — LIDOCAINE 1 PATCH: 4 CREAM TOPICAL at 16:34

## 2023-07-21 NOTE — ED PROVIDER NOTE - NSFOLLOWUPINSTRUCTIONS_ED_ALL_ED_FT
Please follow-up with a GI doctor and orthopedist. Their contact information is located below.    You can take Pepcid or Omeprazole for your acid reflux. These are over the counter medications.    You can use 500-1000mg Tylenol every 6 hours for pain - as needed.  This is an over-the-counter medications - please respect the warnings on the label. This medication come with certain risks and side effects that you need to discuss with your doctor, especially if you are taking it for a prolonged period.    You can use 400-600mg Ibuprofen (such as motrin or advil) every 6 to 8 hours as needed for pain control.  Take ibuprofen with food or milk to lessen stomach upset.  This is an over-the-counter medication please respect the warnings on the label. All medications come with certain risks and side effects that you need to discuss with your doctor, especially if you are taking them for a prolonged period.    You can  the medication Flexeril (muscle relaxer) and take as needed for worsening back pain.    Lidocaine patches can also help with your pain. They are called salon-pas patches.    Back Pain    Back pain is very common in adults. The cause of back pain is rarely dangerous and the pain often gets better over time. The cause of your back pain may not be known and may include strain of muscles or ligaments, degeneration of the spinal disks, or arthritis. Occasionally the pain may radiate down your leg(s). Over-the-counter medicines to reduce pain and inflammation are often the most helpful. Stretching and remaining active frequently helps the healing process.     SEEK IMMEDIATE MEDICAL CARE IF YOU HAVE ANY OF THE FOLLOWING SYMPTOMS: bowel or bladder control problems, unusual weakness or numbness in your arms or legs, nausea or vomiting, abdominal pain, fever, dizziness/lightheadedness.    Chest Pain    Chest pain can be caused by many different conditions which may or may not be dangerous. Causes include heartburn, lung infections, heart attack, blood clot in lungs, skin infections, strain or damage to muscle, cartilage, or bones, etc. In addition to a history and physical examination, an electrocardiogram (ECG) or other lab tests may have been performed to determine the cause of your chest pain. Follow up with your primary care provider or with a cardiologist as instructed.     SEEK IMMEDIATE MEDICAL CARE IF YOU HAVE ANY OF THE FOLLOWING SYMPTOMS: worsening chest pain, coughing up blood, unexplained back/neck/jaw pain, severe abdominal pain, dizziness or lightheadedness, fainting, shortness of breath, sweaty or clammy skin, vomiting, or racing heart beat. These symptoms may represent a serious problem that is an emergency. Do not wait to see if the symptoms will go away. Get medical help right away. Call 911 and do not drive yourself to the hospital.

## 2023-07-21 NOTE — ED PROVIDER NOTE - [facility]
Outreach attempt was made to schedule a Medicare Wellness Visit. This was the first attempt. Contact was made, MW[facility] appointment scheduled.     Right Deviation

## 2023-07-21 NOTE — ED PROVIDER NOTE - PATIENT PORTAL LINK FT
You can access the FollowMyHealth Patient Portal offered by [facility] by registering at the following website: [email or web address]. By joining [facility]’s FollowMyHealth portal, you will also be able to view your health information using other applications (apps) compatible with our system.

## 2023-07-21 NOTE — ED PROVIDER NOTE - CLINICAL SUMMARY MEDICAL DECISION MAKING FREE TEXT BOX
Malvin Brunson MD (Attending Physician): The patient is a 68y Female with pmhx of DM2, HTN, HLD, junctional bradycardia s/p [place] [employer] dual chamber PPM (08/10/2022 for symptomatic bradycardia at 30 bpm, frequent PVCs) p/w right upper back pain that radiates to chest with associated nausea. Most likely MSK related pain, but DDx includes, but not limited to: ACS, aortic dissection, PNA, PTX, GERD. ekg, cxr, cta chest/abd/pelvis, labs, pain control, IVF. Dispo pending w/u.

## 2023-07-21 NOTE — CONSULT NOTE ADULT - SUBJECTIVE AND OBJECTIVE BOX
[facility] - Nahid Greene, Tim Ruiz Savella, [place]  Office Number: [phone]    Initial Consult Note    CHIEF COMPLAINT: Patient is a 68y old  Female who presents with a chief complaint of     HPI:  This is a 67 y/o F with Diastolic Dysfunction, SSS/syncope s/p PPM, [place] [employer] (8/2022 by Dr. Dowling), T2DM, COPD/Asthma, CVA (with residual facial numbness), OA, hx of knee ,back and shoulder presentede to the ED today c/o    PAST MEDICAL & SURGICAL HISTORY:  Diabetes mellitus  Stroke  H/O knee surgery  S/P cholecystectomy  History of back surgery  H/O shoulder surgery  SOCIAL HISTORY:  No tobacco, ethanol, or drug abuse.  FAMILY HISTORY:  Family history of premature CAD (Mother)    No family history of acute MI or sudden cardiac death.  MEDICATIONS  (STANDING):    MEDICATIONS  (PRN):    Allergies    shellfish (Swelling)  Avelox (Seizure)    Intolerances    REVIEW OF SYSTEMS:  CONSTITUTIONAL: No weakness, fevers or chills  EYES/ENT: No visual changes;  No vertigo or throat pain   NECK: No pain or stiffness  RESPIRATORY: No cough, wheezing, hemoptysis; No shortness of breath  CARDIOVASCULAR: No chest pain or palpitations  GASTROINTESTINAL: No abdominal pain. No nausea, vomiting, or hematemesis; No diarrhea or constipation. No melena or hematochezia.  GENITOURINARY: No dysuria, frequency or hematuria  NEUROLOGICAL: No numbness or weakness  SKIN: No itching or rash  All other review of systems is negative unless indicated above  VITAL SIGNS:   Vital Signs Last 24 Hrs  T(C): --  T(F): --  HR: --  BP: --  BP(mean): --  RR: --  SpO2: --    I&O's Summary    On Exam:  Constitutional: NAD, alert and oriented x 3  Lungs:  Non-labored, breath sounds are clear bilaterally, No wheezing, rales or rhonchi  Cardiovascular: RRR.  S1 and S2 positive.  No murmurs, rubs, gallops or clicks  Gastrointestinal: Bowel Sounds present, soft, nontender.   Lymph: No peripheral edema. No cervical lymphadenopathy.  Neurological: Alert, no focal deficits  Skin: No rashes or ulcers   Psych:  Mood & affect appropriate.    LABS: All Labs Reviewed:                        12.6   7.85  )-----------( 289      ( 21 Jul 2023 15:15 )             40.1   Mg     2.1       21 Jul 2023 15:15  PT/INR - ( 21 Jul 2023 15:15 )   PT: 11.1 sec;   INR: 0.95 ratio    PTT - ( 21 Jul 2023 15:15 )  PTT:30.5 sec    RADIOLOGY:    Patient name: GUNNER LEVY  YOB: 1955   Age: 67 (F)   MR#: [id number]  Study Date: 8/10/2022  [facility] RDCS  Study quality: Technically difficult  Referring Physician: Waqas Dowling MD  Blood Pressure: 143/78 mmHg  Height: 170 cm  Weight: 62 kg  BSA: 1.7 m2  Heart Rate: 69 mmHg  ------------------------------------------------------------------------  PROCEDURE: Transthoracic echocardiogram with 2-D, M-Mode  and complete spectral and color flow Doppler. Verbal  consent was obtained for injection of  Ultrasonic Enhancing  Agent following a discussion of risks and benefits.  Following intravenous injection of Ultrasonic Enhancing  Agent, harmonic imaging was performed.  INDICATION: Abnormal electrocardiogram (ECG) (EKG) (R94.31)  ------------------------------------------------------------------------  Dimensions:    Normal Values:  LA:     3.0    2.0 - 4.0 cm  Ao:     3.1    2.0 - 3.8 cm  SEPTUM: 0.6    0.6 - 1.2 cm  PWT:    1.0    0.6 - 1.1 cm  LVIDd:  4.5    3.0 - 5.6 cm  LVIDs:  3.1    1.8 - 4.0 cm  Derived variables:  LVMI: 66 g/m2  RWT: 0.44  Fractional short: 31 %  EF (Modified Drummond Rule): 68 %Doppler Peak Velocity  (m/sec): AoV=1.0  ------------------------------------------------------------------------  Observations:  Mitral Valve: Normal mitral valve. No mitral regurgitation  seen.  Aortic Valve/Aorta: Normal trileaflet aortic valve. Peak  transaortic valve gradient equals 4 mm Hg. No aortic valve  regurgitation seen. Peak left ventricular outflow tract  gradient equals 4 mm Hg, mean gradient is equal to 2 mm Hg,  LVOT velocity time integral equals 22 cm.  Aortic Root: 3.1 cm.  Left Atrium: Normal left atrium.  LA volume index = 17  cc/m2.  Left Ventricle: Normal left ventricular systolic function.  No obvious segmental wall motion abnormalities. Endocardial  visualization enhanced with intravenous injection of  Ultrasonic Enhancing Agent (Lumason). LVEF calculated using  biplane Drummond's method was 68%. Normal left ventricular  internal dimensions and wall thicknesses. Normal diastolic  function  Right Heart: Right atrium not well visualized. The right  ventricle is not well visualized. Normal tricuspid valve.  No tricuspid regurgitation. RVSP not calculated due to  insufficient Doppler signal. Normal pulmonic valve.  Pericardium/Pleura: No pericardial effusion seen.  ------------------------------------------------------------------------  Conclusions:  1. Normal left atrium.  LA volumeindex = 17 cc/m2.  2. Normal left ventricular systolic function. No obvious  segmental wall motion abnormalities. Endocardial  visualization enhanced with intravenous injection of  Ultrasonic Enhancing Agent (Lumason). LVEF calculated using  biplane Drummond's method was 68%.  3. Normal diastolic function  4. Right atrium not well visualized.  5. The right ventricle is not well visualized.  6. Normal tricuspid valve. No tricuspid regurgitation. RVSP  not calculated due to insufficient Doppler signal.  7. No pericardial effusion seen.  *** No previous Echo exam.  Please note that the patient was scanned in a supine  position hence image quality was fair.  ------------------------------------------------------------------------  Confirmed on  8/10/2022 - 11:56:27 by Geovanna Guajardo M.D.  ------------------------------------------------------------------------  EKG:    Assessment/Plan:      Cecy Beckett DNP, NP-C, AGACNP-C  Cardiology  Call TEAMS

## 2023-07-21 NOTE — CONSULT NOTE ADULT - SUBJECTIVE AND OBJECTIVE BOX
CHIEF COMPLAINT: Patient is a 68y old  Female who presents with a chief complaint of     HPI:      EKG:    REVIEW OF SYSTEMS:   All other review of systems are negative unless indicated above    PAST MEDICAL & SURGICAL HISTORY:  Diabetes mellitus      Asthma      Stroke      H/O knee surgery      S/P cholecystectomy      History of back surgery      H/O shoulder surgery          SOCIAL HISTORY:  No tobacco, ethanol, or drug abuse.    FAMILY HISTORY:  Family history of premature CAD (Mother)      No family history of acute MI or sudden cardiac death.    MEDICATIONS  (STANDING):    MEDICATIONS  (PRN):      Allergies    shellfish (Swelling)  Avelox (Seizure)    Intolerances        Home meds:  Home Medications:  acetaminophen 325 mg oral tablet: 2 tab(s) orally every 6 hours, As needed, Mild Pain (1 - 3) (11 Aug 2022 04:54)  glimepiride 4 mg oral tablet: 1 tab(s) orally 2 times a day   (12 Aug 2022 11:28)  metFORMIN 1000 mg oral tablet: 1 tab(s) orally 2 times a day   (12 Aug 2022 11:28)  predniSONE 5 mg oral tablet: 1 tab(s) orally once a day, As Needed   (12 Aug 2022 11:28)        VITAL SIGNS:   Vital Signs Last 24 Hrs  T(C): --  T(F): --  HR: --  BP: --  BP(mean): --  RR: --  SpO2: --        I&O's Summary      On Exam:  TELE:   Constitutional: NAD, awake and alert, well-developed  HEENT: Moist Mucous Membranes, Anicteric  Pulmonary: Non-labored, breath sounds are clear bilaterally, No wheezing, rales or rhonchi  Cardiovascular: Regular, S1 and S2, No murmurs, rubs, gallops or clicks  Gastrointestinal: Bowel Sounds present, soft, nontender.   Lymph: No peripheral edema. No lymphadenopathy.  Skin: No visible rashes or ulcers.  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 She had an echocardiogram in August of 2022 that showed normal LV function with no significant valvular heart disease. Her device is being monitored in our office.     She is not reporting any symptoms of chest pain, increased dyspnea, PND, orthopnea, LE swelling, dizziness, or syncope. She was supposed to be taking Toprol for her PVCs, but has not been taking it. I again stressed the importance of medication compliance.      EKG:    REVIEW OF SYSTEMS:   All other review of systems are negative unless indicated above    PAST MEDICAL & SURGICAL HISTORY:  Diabetes mellitus      Asthma      Stroke      H/O knee surgery      S/P cholecystectomy      History of back surgery      H/O shoulder surgery          SOCIAL HISTORY:  No tobacco, ethanol, or drug abuse.    FAMILY HISTORY:  Family history of premature CAD (Mother)      No family history of acute MI or sudden cardiac death.    MEDICATIONS  (STANDING):    MEDICATIONS  (PRN):      Allergies    shellfish (Swelling)  Avelox (Seizure)    Intolerances        Home meds:  Home Medications:  acetaminophen 325 mg oral tablet: 2 tab(s) orally every 6 hours, As needed, Mild Pain (1 - 3) (11 Aug 2022 04:54)  glimepiride 4 mg oral tablet: 1 tab(s) orally 2 times a day   (12 Aug 2022 11:28)  metFORMIN 1000 mg oral tablet: 1 tab(s) orally 2 times a day   (12 Aug 2022 11:28)  predniSONE 5 mg oral tablet: 1 tab(s) orally once a day, As Needed   (12 Aug 2022 11:28)        VITAL SIGNS:   Vital Signs Last 24 Hrs  T(C): --  T(F): --  HR: --  BP: --  BP(mean): --  RR: --  SpO2: --        I&O's Summary      On Exam:  TELE:   Constitutional: NAD, awake and alert, well-developed  HEENT: Moist Mucous Membranes, Anicteric  Pulmonary: Non-labored, breath sounds are clear bilaterally, No wheezing, rales or rhonchi  Cardiovascular: Regular, S1 and S2, No murmurs, rubs, gallops or clicks  Gastrointestinal: Bowel Sounds present, soft, nontender.   Lymph: No peripheral edema. No lymphadenopathy.  Skin: No visible rashes or ulcers.  Psych:  Mood & affect appropriate for situation    LABS: All Labs Reviewed:                        12.6   7.85  )-----------( 289      ( 21 Jul 2023 15:15 )             40.1       Mg     2.1       21 Jul 2023 15:15      PT/INR - ( 21 Jul 2023 15:15 )   PT: 11.1 sec;   INR: 0.95 ratio         PTT - ( 21 Jul 2023 15:15 )  PTT:30.5 sec      Blood Culture:         RADIOLOGY:              CHIEF COMPLAINT: Patient is a 68y old  Female who presents with a chief complaint of     HPI: This is a 68 year old woman with PMHx of T2DM, HTN, HLD, junctional bradycardia s/p [place] [employer] dual chamber PPM (8/10/2022 for symptomatic bradycardia at 30 bpm, frequent PVCs, presented today with complaints of 10/10 right upper back pain that radiates to chest with associated nausea. She admits to carrying laundry basket when she first felt it this morning and has not getting better and decide to come to ED for further evaluation.    In ED patient seen and examined admits to feeling better now her chest pain is 6/10, after receiving pepcid, she denies any SOB, palpitations, dizziness.      EKG: TWI I, avl, V3-V5     REVIEW OF SYSTEMS:   All other review of systems are negative unless indicated above    PAST MEDICAL & SURGICAL HISTORY:  Diabetes mellitus      Asthma      Stroke      H/O knee surgery      S/P cholecystectomy      History of back surgery      H/O shoulder surgery          SOCIAL HISTORY:  No tobacco, ethanol, or drug abuse.    FAMILY HISTORY:  Family history of premature CAD (Mother)      No family history of acute MI or sudden cardiac death.    MEDICATIONS  (STANDING):    MEDICATIONS  (PRN):      Allergies    shellfish (Swelling)  Avelox (Seizure)    Intolerances        Home meds:  Home Medications:  acetaminophen 325 mg oral tablet: 2 tab(s) orally every 6 hours, As needed, Mild Pain (1 - 3) (11 Aug 2022 04:54)  glimepiride 4 mg oral tablet: 1 tab(s) orally 2 times a day   (12 Aug 2022 11:28)  metFORMIN 1000 mg oral tablet: 1 tab(s) orally 2 times a day   (12 Aug 2022 11:28)  predniSONE 5 mg oral tablet: 1 tab(s) orally once a day, As Needed   (12 Aug 2022 11:28)        VITAL SIGNS:   Vital Signs Last 24 Hrs  T(C): --  T(F): --  HR: --  BP: --  BP(mean): --  RR: --  SpO2: --        I&O's Summary      On Exam:  TELE: SR 60's   Constitutional: NAD, awake and alert  HEENT: Moist Mucous Membranes, Anicteric  Pulmonary: Non-labored, breath sounds are clear bilaterally, No wheezing, rales or rhonchi  Cardiovascular: Regular, S1 and S2, No murmurs, rubs, gallops or clicks  Gastrointestinal: Bowel Sounds present, soft, nontender.   Lymph: No peripheral edema. No lymphadenopathy.  Skin: No visible rashes or ulcers.  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 She admits to carrying laundry basket when she first felt it this morning and has not getting better and decide to come to ED for further evaluation.    In ED patient seen and examined admits to feeling better now her chest pain is 6/10, after receiving pepcid, she denies any SOB, palpitations, dizziness.      EKG: TWI I, avl, V3-V5    REVIEW OF SYSTEMS:   All other review of systems are negative unless indicated above    PAST MEDICAL & SURGICAL HISTORY:  Diabetes mellitus      Asthma      Stroke      H/O knee surgery      S/P cholecystectomy      History of back surgery      H/O shoulder surgery          SOCIAL HISTORY:  No tobacco, ethanol, or drug abuse.    FAMILY HISTORY:  Family history of premature CAD (Mother)      No family history of acute MI or sudden cardiac death.    MEDICATIONS  (STANDING):    MEDICATIONS  (PRN):      Allergies    shellfish (Swelling)  Avelox (Seizure)    Intolerances        Home meds:  Home Medications:  acetaminophen 325 mg oral tablet: 2 tab(s) orally every 6 hours, As needed, Mild Pain (1 - 3) (11 Aug 2022 04:54)  glimepiride 4 mg oral tablet: 1 tab(s) orally 2 times a day   (12 Aug 2022 11:28)  metFORMIN 1000 mg oral tablet: 1 tab(s) orally 2 times a day   (12 Aug 2022 11:28)  predniSONE 5 mg oral tablet: 1 tab(s) orally once a day, As Needed   (12 Aug 2022 11:28)        VITAL SIGNS:   Vital Signs Last 24 Hrs  T(C): --  T(F): --  HR: --  BP: --  BP(mean): --  RR: --  SpO2: --        I&O's Summary      On Exam:  TELE: SR 60's   Constitutional: NAD, awake and alert  HEENT: Moist Mucous Membranes, Anicteric  Pulmonary: Non-labored, breath sounds are clear bilaterally, No wheezing, rales or rhonchi  Cardiovascular: Regular, S1 and S2, No murmurs, rubs, gallops or clicks  Gastrointestinal: Bowel Sounds present, soft, nontender.   Lymph: No peripheral edema. No lymphadenopathy.  Skin: No visible rashes or ulcers.  Psych:  Mood & affect appropriate for situation    LABS: All Labs Reviewed:                        12.6   7.85  )-----------( 289      ( 21 Jul 2023 15:15 )             40.1       Mg     2.1       21 Jul 2023 15:15      PT/INR - ( 21 Jul 2023 15:15 )   PT: 11.1 sec;   INR: 0.95 ratio         PTT - ( 21 Jul 2023 15:15 )  PTT:30.5 sec

## 2023-07-21 NOTE — ED ADULT TRIAGE NOTE - CHIEF COMPLAINT QUOTE
pt ambulatory to triage a&ox4 with complaints of left side back pain radiating to chest x 2 days also c/o feeling heartburn and nausea. EKG obtained in triage

## 2023-07-21 NOTE — ED ADULT NURSE NOTE - OBJECTIVE STATEMENT
a/ox4 patient came to ED c.o right shoulder pain radiating to back that began last week, worsening today. No rash noted. No SOB. No n.v.d. Pending further labs and radiology reports.

## 2023-07-21 NOTE — CONSULT NOTE ADULT - ASSESSMENT
68 year old woman with PMHx of T2DM, HTN, HLD, junctional bradycardia s/p [place] [employer] dual chamber PPM (8/10/2022 for symptomatic bradycardia at 30 bpm, frequent PVCs, presented today with complaints of 10/10 right upper back pain that radiates to chest with associated nausea.    Chest pain  - Patient presented with 10/10 right upper back pain that radiates to mid anterior chest   - s/p pepcid with some relief now endorsing 6/10 right upper back pain when palpated, with NO radiation to mid anterior chest     - EKG showed TWI I avl, V3-V6  - chest pain atypical in nature possibly MSK vs GI etiology   - Troponin negative x 1. Follow up second set.  - CK is normal, suggesting against acute atherosclerotic plaque rupture.  - f/u  CTAP   - Patient stable from cardiac to be discharged home, if cardiac enzymes negative x 2   - Patient can follow up outpatient for further cardiac care, will make an appointment to see Dr. Ruiz in the office    - no sign of volume overload  - no O2 requirement   - Monitor and replete Lytes. Keep K > 4 and Mg > 2      EAN Bundy  Nurse Practitioner - Cardiology   call TEAMS   68 year old woman with PMHx of T2DM, HTN, HLD, junctional bradycardia s/p [place] [employer] dual chamber PPM (8/10/2022 for symptomatic bradycardia at 30 bpm, frequent PVCs, presented today with complaints of 10/10 right upper back pain that radiates to chest with associated nausea.    Chest pain  - Patient presented with 10/10 right upper back pain that radiates to mid anterior chest   - s/p pepcid with some relief now endorsing 6/10 right upper back pain when palpated, with NO radiation to mid anterior chest     - EKG showed TWI I avl, V3-V6  - chest pain atypical in nature possibly MSK vs GI etiology   - Troponin negative x 1. Follow up second set.  - CK is normal, suggesting against acute atherosclerotic plaque rupture.  - f/u  CTAP   - Patient stable from cardiac to be discharged home, if cardiac enzymes negative x 2   - Patient can follow up outpatient for further cardiac care, will make an appointment to see Dr. Ruiz in the office    - CXR clear   - no sign of volume overload  - no O2 requirement   - Monitor and replete Lytes. Keep K > 4 and Mg > 2      EAN Bundy  Nurse Practitioner - Cardiology   call TEAMS   68 year old woman with PMHx of T2DM, HTN, HLD, junctional bradycardia s/p [place] [employer] dual chamber PPM (8/10/2022 for symptomatic bradycardia at 30 bpm, frequent PVCs, presented today with complaints of 10/10 right upper back pain that radiates to chest with associated nausea.    Chest pain  - Patient presented with 10/10 right upper back pain that radiates to mid anterior chest   - s/p pepcid with some relief now endorsing 6/10 right upper back pain when palpated, with NO radiation to mid anterior chest     - EKG showed TWI I avl, V3-V6, likel lead placement issue, can repeat   - chest pain atypical in nature possibly MSK vs GI etiology   - Troponin negative x 1. Follow up second set.  - CK is normal, suggesting against acute atherosclerotic plaque rupture.  - f/u  CTAP   - Patient stable from cardiac to be discharged home, if cardiac enzymes negative x 2   - Patient can follow up outpatient for further cardiac care, will make an appointment to see Dr. Ruiz in the office    - CXR clear   - no sign of volume overload  - no O2 requirement   - Monitor and replete Lytes. Keep K > 4 and Mg > 2      EAN Bundy  Nurse Practitioner - Cardiology   call TEAMS   68 year old woman with PMHx of T2DM, HTN, HLD, junctional bradycardia s/p [place] [employer] dual chamber PPM (8/10/2022 for symptomatic bradycardia at 30 bpm, frequent PVCs, presented today with complaints of 10/10 right upper back pain that radiates to chest with associated nausea.    Chest pain  - Patient presented with 10/10 right upper back pain that radiates to mid anterior chest   - s/p pepcid with some relief now endorsing 6/10 right upper back pain when palpated, with NO radiation to mid anterior chest     - EKG showed TWI I avl, V3-V6, likely lead placement issue, can repeat   - chest pain atypical in nature possibly MSK vs GI etiology   - Troponin negative x 1. Follow up second set.  - CK is normal, suggesting against acute atherosclerotic plaque rupture.  - f/u  CTAP   - Patient stable from cardiac to be discharged home, if cardiac enzymes negative x 2   - Patient can follow up outpatient for further cardiac care, will make an appointment to see Dr. Ruiz in the office    - CXR clear   - no sign of volume overload  - no O2 requirement   - Monitor and replete Lytes. Keep K > 4 and Mg > 2      EAN Bundy  Nurse Practitioner - Cardiology   call TEAMS

## 2023-07-21 NOTE — ED ADULT NURSE NOTE - NSFALLUNIVINTERV_ED_ALL_ED
Bed/Stretcher in lowest position, wheels locked, appropriate side rails in place/Call bell, personal items and telephone in reach/Instruct patient to call for assistance before getting out of bed/chair/stretcher/Non-slip footwear applied when patient is off stretcher/[place] to call system/Physically safe environment - no spills, clutter or unnecessary equipment/Purposeful proactive rounding/Room/bathroom lighting operational, light cord in reach

## 2023-07-21 NOTE — ED PROVIDER NOTE - NSFOLLOWUPCLINICSTOKEN_GEN_ALL_ED_FT
[id number]: || ||00\01||False;[id number]: || ||00\01||False;[id number]: || ||00\01||False;[id number]: || ||00\01||False;[id number]: || ||00\01||False;[id number]: || ||00\01||False;[id number]: || ||00\01||False;[id number]: || ||00\01||False;

## 2023-07-21 NOTE — ED PROVIDER NOTE - OBJECTIVE STATEMENT
The patient is a 68y Female with pmhx of DM2, HTN, HLD, junctional bradycardia s/p [place] [employer] dual chamber PPM (08/10/2022 for symptomatic bradycardia at 30 bpm, frequent PVCs) p/w right upper back pain that radiates to chest with associated nausea. She admits to carrying laundry basket when she first felt it this morning, says pain has not improved, so came to ED for further evaluation. No hx of ACS/stent placement. No hx of DVT/PE. Says CP is not exertional, not pleuritic. Denies fever, sob, palpitations, extremity weakness/sensory deficits, leg swelling. Denies having pain like this before. Says pain is constant, can't identify any obvious exacerbating factors, says she feels better when she lays down.

## 2023-07-21 NOTE — ED PROVIDER NOTE - PROGRESS NOTE DETAILS
Malvin Brunson MD (Attending Physician): Pt was re-evaluated at bedside, VSS, feeling better overall. Cardiology looked at initial EKG, which appeared abnormal with multiple T wave inversions, and said the leads were placed wrong, so said that she is cleared from cardiology perspective. Other labs and imaging non-actionable. Results were discussed with patient as well as return precautions and follow up plan with GI and orthopedic surgery. Time was taken to answer any questions that the patient had before providing them with discharge paperwork.

## 2023-07-21 NOTE — CONSULT NOTE ADULT - NS ATTEND AMEND GEN_ALL_CORE FT
The patient has symptoms of atypical chest pain. The patient's chest pain is unlikely related to an acute coronary syndrome. It is reproducible on my exam. likely MSK. cont pain meds. EKG with new twi but AVR is upright and likely 2/2 limb lead reversal. can repeat. troponin neg.  No signs of volume overload.  At this point if all of ED workup is negative, then the patient can be discharged from a cardiac standpoint and will followup as an outpt for further cardiac testing.

## 2023-07-21 NOTE — ED PROVIDER NOTE - NSICDXFAMILYHX_GEN_ALL_CORE_FT
- - -
FAMILY HISTORY:  Mother  Still living? Unknown  Family history of premature CAD, Age at diagnosis: Age Unknown

## 2023-07-21 NOTE — ED ADULT NURSE NOTE - NS ED NURSE IV DC DT
Most likely from Trauma before coming into hospital   - Trial of Tramadol   - Trial of once Flexile  - Lidocaine patch       21-Jul-2023 23:53

## 2023-07-21 NOTE — ED PROVIDER NOTE - PHYSICAL EXAMINATION
GEN - NAD, well appearing, A&Ox3  HEAD - NC/AT  EYES - PERRL, EOMI  ENT - Airway patent, mucous membranes moist  NECK - Supple, non-tender without lymphadenopathy, no masses  PULMONARY - CTA b/l, symmetric breath sounds, no W/R/R  CARDIAC - +S1S2, RRR, no M/G/R, no JVD  CHEST - Mildly TTP over R chest wall  ABDOMEN - +BS, ND, NT, soft, no guarding, no rebound, no masses, no rigidity   - No CVA TTP, no suprapubic TTP  BACK - No midline tenderness. Kendra TTP over R trapezius. R thoracic paraspinal TTP.  EXTREMITIES - FROM, symmetric pulses, capillary refill <2 seconds, no edema, 5/5 strength in b/l UE and LE  SKIN - No rash or bruising  NEUROLOGIC - Alert, speech clear, no focal deficits  PSYCH - Normal mood/affect, normal insight

## 2023-07-21 NOTE — ED PROVIDER NOTE - NSFOLLOWUPCLINICS_GEN_ALL_ED_FT
[facility]  Gastroenterology  [address] [place] [address]  Phone: [phone]  Fax:     [facility]  Gastroenterology  [address] [place] [address]  Phone: [phone]  Fax:     [facility]  Gastroenterology  [address], [address]  [place] [address]  Phone: [phone]  Fax:     [facility]  Orthopedic Surgery  [address] [place] [address]  Phone: [phone]  Fax:     [facility]  Orthopedic Surgery  [address], 3rd & [address]th [place] [address]  Phone: [phone]  Fax:     [facility] [place]  Orthopedics  .  [place]   Phone: [phone]  Fax:     [facility]  Orthopedic Surgery  [address] [address]  Phone: [phone]  Fax:     [facility]  Orthopedic Surgery  [address] [place] [address]  Phone: [phone]  Fax:

## 2023-07-22 LAB
B-OH-BUTYR SERPL-SCNC: 0.2 MMOL/L — SIGNIFICANT CHANGE UP
BASE EXCESS BLDV CALC-SCNC: -1.6 MMOL/L — SIGNIFICANT CHANGE UP (ref -2–3)
BLOOD GAS COMMENTS, VENOUS: SIGNIFICANT CHANGE UP
GAS PNL BLDV: SIGNIFICANT CHANGE UP
HCO3 BLDV-SCNC: 25 MMOL/L — SIGNIFICANT CHANGE UP (ref 22–29)
PCO2 BLDV: 55 MMHG — HIGH (ref 39–42)
PH BLDV: 7.27 — LOW (ref 7.32–7.43)
PO2 BLDV: 135 MMHG — HIGH (ref 25–45)
SAO2 % BLDV: 99.7 % — HIGH (ref 67–88)

## 2023-07-30 NOTE — ED ADULT NURSE NOTE - HIV OFFER
The patient is a 2y6m Male complaining of difficulty breathing.
Previously Declined (within the last year)

## 2023-08-02 ENCOUNTER — NON-APPOINTMENT (OUTPATIENT)
Age: 68
End: 2023-08-02

## 2023-08-02 NOTE — PATIENT PROFILE ADULT - VISION (WITH CORRECTIVE LENSES IF THE PATIENT USUALLY WEARS THEM):
Detail Level: Detailed Depth Of Biopsy: dermis Was A Bandage Applied: Yes Size Of Lesion In Cm: 0.5 X Size Of Lesion In Cm: 0 Biopsy Type: H and E Biopsy Method: double edge Personna blade Anesthesia Type: 1% lidocaine with epinephrine Hemostasis: Electrocautery and Aluminum Chloride Wound Care: Petrolatum Dressing: bandage Destruction After The Procedure: No Type Of Destruction Used: Curettage Curettage Text: The wound bed was treated with curettage after the biopsy was performed. Cryotherapy Text: The wound bed was treated with cryotherapy after the biopsy was performed. Electrodesiccation Text: The wound bed was treated with electrodesiccation after the biopsy was performed. Electrodesiccation And Curettage Text: The wound bed was treated with electrodesiccation and curettage after the biopsy was performed. Silver Nitrate Text: The wound bed was treated with silver nitrate after the biopsy was performed. Lab: 6 Consent: Written consent was obtained and risks were reviewed including but not limited to scarring, infection, bleeding, scabbing, incomplete removal, nerve damage and allergy to anesthesia. Post-Care Instructions: I reviewed with the patient in detail post-care instructions. Patient is to keep the biopsy site dry overnight, and then apply bacitracin twice daily until healed. Patient may apply hydrogen peroxide soaks to remove any crusting. Notification Instructions: Patient will be notified of biopsy results. However, patient instructed to call the office if not contacted within 2 weeks. Billing Type: Third-Party Bill Information: Selecting Yes will display possible errors in your note based on the variables you have selected. This validation is only offered as a suggestion for you. PLEASE NOTE THAT THE VALIDATION TEXT WILL BE REMOVED WHEN YOU FINALIZE YOUR NOTE. IF YOU WANT TO FAX A PRELIMINARY NOTE YOU WILL NEED TO TOGGLE THIS TO 'NO' IF YOU DO NOT WANT IT IN YOUR FAXED NOTE. Normal vision: sees adequately in most situations; can see medication labels, newsprint

## 2023-10-06 NOTE — DIETITIAN INITIAL EVALUATION ADULT. - PROBLEM SELECTOR PLAN 1
Pt last seen 7/31/23, has appt 1/31/24. Refill sent to pharmacy. NEUROLOGY EVALUATION ,MRI BRAIN TO RULE OUT CVA ,CONTINUE ASA AND STATIN ,tele monitoring to rule out AFIB ,SEEN BY CARD CONSULT

## 2023-11-01 ENCOUNTER — NON-APPOINTMENT (OUTPATIENT)
Age: 68
End: 2023-11-01

## 2023-11-01 ENCOUNTER — APPOINTMENT (OUTPATIENT)
Dept: CARDIOLOGY | Facility: CLINIC | Age: 68
End: 2023-11-01
Payer: MEDICARE

## 2023-11-01 PROCEDURE — 93294 REM INTERROG EVL PM/LDLS PM: CPT

## 2023-11-01 PROCEDURE — 93296 REM INTERROG EVL PM/IDS: CPT

## 2024-01-10 ENCOUNTER — APPOINTMENT (OUTPATIENT)
Dept: CARDIOLOGY | Facility: CLINIC | Age: 69
End: 2024-01-10

## 2024-04-30 ENCOUNTER — NON-APPOINTMENT (OUTPATIENT)
Age: 69
End: 2024-04-30

## 2024-05-01 ENCOUNTER — APPOINTMENT (OUTPATIENT)
Dept: CARDIOLOGY | Facility: CLINIC | Age: 69
End: 2024-05-01
Payer: MEDICARE

## 2024-05-01 PROCEDURE — 93294 REM INTERROG EVL PM/LDLS PM: CPT

## 2024-05-01 PROCEDURE — 93296 REM INTERROG EVL PM/IDS: CPT

## 2024-07-24 ENCOUNTER — NON-APPOINTMENT (OUTPATIENT)
Age: 69
End: 2024-07-24

## 2024-07-24 ENCOUNTER — APPOINTMENT (OUTPATIENT)
Dept: CARDIOLOGY | Facility: CLINIC | Age: 69
End: 2024-07-24
Payer: MEDICARE

## 2024-07-24 VITALS
OXYGEN SATURATION: 99 % | DIASTOLIC BLOOD PRESSURE: 75 MMHG | BODY MASS INDEX: 21.82 KG/M2 | WEIGHT: 139 LBS | HEART RATE: 74 BPM | HEIGHT: 67 IN | SYSTOLIC BLOOD PRESSURE: 133 MMHG

## 2024-07-24 VITALS — DIASTOLIC BLOOD PRESSURE: 64 MMHG | SYSTOLIC BLOOD PRESSURE: 122 MMHG

## 2024-07-24 DIAGNOSIS — I49.3 VENTRICULAR PREMATURE DEPOLARIZATION: ICD-10-CM

## 2024-07-24 PROCEDURE — 93000 ELECTROCARDIOGRAM COMPLETE: CPT

## 2024-07-24 PROCEDURE — G2211 COMPLEX E/M VISIT ADD ON: CPT

## 2024-07-24 PROCEDURE — 99214 OFFICE O/P EST MOD 30 MIN: CPT

## 2024-07-24 NOTE — DISCUSSION/SUMMARY
[FreeTextEntry1] : This is a 69 year old woman with a history of junctional bradycardia s/p [place] [employer] dual chamber PPM on 8/10/2022 for symptomatic bradycardia at 30 bpm, frequent PVCs, and a very low burden of PAF on device interrogation who presents to the office for follow up.  She had an echocardiogram in August of 2022 that showed normal LV function with no significant valvular heart disease.    She is going to start Toprol 25 QD for her history of PVCs.  She will follow with me in 6 months.  [EKG obtained to assist in diagnosis and management of assessed problem(s)] : EKG obtained to assist in diagnosis and management of assessed problem(s)

## 2024-07-24 NOTE — HISTORY OF PRESENT ILLNESS
[FreeTextEntry1] : This is a 69 year old woman with a history of junctional bradycardia s/p [place] [employer] dual chamber PPM on 8/10/2022 for symptomatic bradycardia at 30 bpm, frequent PVCs, and a very low burden of PAF on device interrogation who presents to the office for follow up.  She had an echocardiogram in August of 2022 that showed normal LV function with no significant valvular heart disease.   She is not reporting any symptoms of chest pain, increased dyspnea, PND, orthopnea, LE swelling, dizziness, or syncope.  She was supposed to be taking Toprol for her PVCs, but has not been taking it.  I again stressed the importance of medication compliance.

## 2024-07-31 ENCOUNTER — APPOINTMENT (OUTPATIENT)
Dept: CARDIOLOGY | Facility: CLINIC | Age: 69
End: 2024-07-31

## 2024-09-05 ENCOUNTER — NON-APPOINTMENT (OUTPATIENT)
Age: 69
End: 2024-09-05

## 2024-09-05 ENCOUNTER — APPOINTMENT (OUTPATIENT)
Dept: CARDIOLOGY | Facility: CLINIC | Age: 69
End: 2024-09-05
Payer: MEDICARE

## 2024-09-05 PROCEDURE — 93294 REM INTERROG EVL PM/LDLS PM: CPT

## 2024-09-05 PROCEDURE — 93296 REM INTERROG EVL PM/IDS: CPT

## 2024-11-25 ENCOUNTER — INPATIENT (INPATIENT)
Facility: HOSPITAL | Age: 69
LOS: 4 days | Discharge: HOME | DRG: 101 | End: 2024-11-30
Attending: INTERNAL MEDICINE | Admitting: INTERNAL MEDICINE
Payer: MEDICARE

## 2024-11-25 VITALS
DIASTOLIC BLOOD PRESSURE: 73 MMHG | WEIGHT: 136.03 LBS | HEART RATE: 73 BPM | TEMPERATURE: 98 F | RESPIRATION RATE: 18 BRPM | OXYGEN SATURATION: 97 % | SYSTOLIC BLOOD PRESSURE: 143 MMHG

## 2024-11-25 DIAGNOSIS — Z98.89 OTHER SPECIFIED POSTPROCEDURAL STATES: Chronic | ICD-10-CM

## 2024-11-25 DIAGNOSIS — Z90.49 ACQUIRED ABSENCE OF OTHER SPECIFIED PARTS OF DIGESTIVE TRACT: Chronic | ICD-10-CM

## 2024-11-25 LAB
BASOPHILS # BLD AUTO: 0.12 K/UL — SIGNIFICANT CHANGE UP (ref 0–0.2)
BASOPHILS NFR BLD AUTO: 1.3 % — SIGNIFICANT CHANGE UP (ref 0–2)
EOSINOPHIL # BLD AUTO: 0.67 K/UL — HIGH (ref 0–0.5)
EOSINOPHIL NFR BLD AUTO: 7.4 % — HIGH (ref 0–6)
HCT VFR BLD CALC: 38.6 % — SIGNIFICANT CHANGE UP (ref 34.5–45)
HGB BLD-MCNC: 12.8 G/DL — SIGNIFICANT CHANGE UP (ref 11.5–15.5)
IMM GRANULOCYTES NFR BLD AUTO: 0.1 % — SIGNIFICANT CHANGE UP (ref 0–0.9)
LYMPHOCYTES # BLD AUTO: 3.27 K/UL — SIGNIFICANT CHANGE UP (ref 1–3.3)
LYMPHOCYTES # BLD AUTO: 36 % — SIGNIFICANT CHANGE UP (ref 13–44)
MCHC RBC-ENTMCNC: 27.8 PG — SIGNIFICANT CHANGE UP (ref 27–34)
MCHC RBC-ENTMCNC: 33.2 G/DL — SIGNIFICANT CHANGE UP (ref 32–36)
MCV RBC AUTO: 83.7 FL — SIGNIFICANT CHANGE UP (ref 80–100)
MONOCYTES # BLD AUTO: 0.68 K/UL — SIGNIFICANT CHANGE UP (ref 0–0.9)
MONOCYTES NFR BLD AUTO: 7.5 % — SIGNIFICANT CHANGE UP (ref 2–14)
NEUTROPHILS # BLD AUTO: 4.34 K/UL — SIGNIFICANT CHANGE UP (ref 1.8–7.4)
NEUTROPHILS NFR BLD AUTO: 47.7 % — SIGNIFICANT CHANGE UP (ref 43–77)
NRBC # BLD: 0 /100 WBCS — SIGNIFICANT CHANGE UP (ref 0–0)
NRBC BLD-RTO: 0 /100 WBCS — SIGNIFICANT CHANGE UP (ref 0–0)
PLATELET # BLD AUTO: 247 K/UL — SIGNIFICANT CHANGE UP (ref 150–400)
RBC # BLD: 4.61 M/UL — SIGNIFICANT CHANGE UP (ref 3.8–5.2)
RBC # FLD: 12.6 % — SIGNIFICANT CHANGE UP (ref 10.3–14.5)
WBC # BLD: 9.09 K/UL — SIGNIFICANT CHANGE UP (ref 3.8–10.5)
WBC # FLD AUTO: 9.09 K/UL — SIGNIFICANT CHANGE UP (ref 3.8–10.5)

## 2024-11-25 PROCEDURE — 93010 ELECTROCARDIOGRAM REPORT: CPT

## 2024-11-25 PROCEDURE — 99285 EMERGENCY DEPT VISIT HI MDM: CPT

## 2024-11-25 RX ORDER — ACETAMINOPHEN 500 MG/5ML
1000 LIQUID (ML) ORAL ONCE
Refills: 0 | Status: COMPLETED | OUTPATIENT
Start: 2024-11-25 | End: 2024-11-25

## 2024-11-25 RX ORDER — LEVETIRACETAM 10 MG/ML
1500 INJECTION, SOLUTION INTRAVENOUS ONCE
Refills: 0 | Status: DISCONTINUED | OUTPATIENT
Start: 2024-11-25 | End: 2024-11-25

## 2024-11-25 RX ADMIN — LEVETIRACETAM 1500 MILLIGRAM(S): 10 INJECTION, SOLUTION INTRAVENOUS at 23:58

## 2024-11-26 ENCOUNTER — RESULT REVIEW (OUTPATIENT)
Age: 69
End: 2024-11-26

## 2024-11-26 DIAGNOSIS — G62.9 POLYNEUROPATHY, UNSPECIFIED: ICD-10-CM

## 2024-11-26 DIAGNOSIS — Z29.9 ENCOUNTER FOR PROPHYLACTIC MEASURES, UNSPECIFIED: ICD-10-CM

## 2024-11-26 DIAGNOSIS — E11.9 TYPE 2 DIABETES MELLITUS WITHOUT COMPLICATIONS: ICD-10-CM

## 2024-11-26 DIAGNOSIS — J44.9 CHRONIC OBSTRUCTIVE PULMONARY DISEASE, UNSPECIFIED: ICD-10-CM

## 2024-11-26 DIAGNOSIS — R56.9 UNSPECIFIED CONVULSIONS: ICD-10-CM

## 2024-11-26 DIAGNOSIS — Z95.0 PRESENCE OF CARDIAC PACEMAKER: ICD-10-CM

## 2024-11-26 DIAGNOSIS — I10 ESSENTIAL (PRIMARY) HYPERTENSION: ICD-10-CM

## 2024-11-26 DIAGNOSIS — I50.32 CHRONIC DIASTOLIC (CONGESTIVE) HEART FAILURE: ICD-10-CM

## 2024-11-26 LAB
A1C WITH ESTIMATED AVERAGE GLUCOSE RESULT: 10.1 % — HIGH (ref 4–5.6)
ALBUMIN SERPL ELPH-MCNC: 3.2 G/DL — LOW (ref 3.3–5)
ALP SERPL-CCNC: 58 U/L — SIGNIFICANT CHANGE UP (ref 40–120)
ALT FLD-CCNC: 27 U/L — SIGNIFICANT CHANGE UP (ref 12–78)
ANION GAP SERPL CALC-SCNC: 6 MMOL/L — SIGNIFICANT CHANGE UP (ref 5–17)
APTT BLD: 30.5 SEC — SIGNIFICANT CHANGE UP (ref 24.5–35.6)
AST SERPL-CCNC: 28 U/L — SIGNIFICANT CHANGE UP (ref 15–37)
BILIRUB SERPL-MCNC: 0.2 MG/DL — SIGNIFICANT CHANGE UP (ref 0.2–1.2)
BUN SERPL-MCNC: 15 MG/DL — SIGNIFICANT CHANGE UP (ref 7–23)
CALCIUM SERPL-MCNC: 8.9 MG/DL — SIGNIFICANT CHANGE UP (ref 8.5–10.1)
CHLORIDE SERPL-SCNC: 104 MMOL/L — SIGNIFICANT CHANGE UP (ref 96–108)
CO2 SERPL-SCNC: 25 MMOL/L — SIGNIFICANT CHANGE UP (ref 22–31)
CREAT SERPL-MCNC: 0.68 MG/DL — SIGNIFICANT CHANGE UP (ref 0.5–1.3)
EGFR: 93 ML/MIN/1.73M2 — SIGNIFICANT CHANGE UP
EGFR: 93 ML/MIN/1.73M2 — SIGNIFICANT CHANGE UP
ESTIMATED AVERAGE GLUCOSE: 243 MG/DL — HIGH (ref 68–114)
GLUCOSE SERPL-MCNC: 262 MG/DL — HIGH (ref 70–99)
INR BLD: 0.84 RATIO — LOW (ref 0.85–1.16)
POTASSIUM SERPL-MCNC: 4.8 MMOL/L — SIGNIFICANT CHANGE UP (ref 3.5–5.3)
POTASSIUM SERPL-SCNC: 4.8 MMOL/L — SIGNIFICANT CHANGE UP (ref 3.5–5.3)
PROT SERPL-MCNC: 6.3 G/DL — SIGNIFICANT CHANGE UP (ref 6–8.3)
PROTHROM AB SERPL-ACNC: 9.9 SEC — SIGNIFICANT CHANGE UP (ref 9.9–13.4)
SODIUM SERPL-SCNC: 135 MMOL/L — SIGNIFICANT CHANGE UP (ref 135–145)
TROPONIN I, HIGH SENSITIVITY RESULT: 37.7 NG/L — SIGNIFICANT CHANGE UP
TROPONIN I, HIGH SENSITIVITY RESULT: 38.5 NG/L — SIGNIFICANT CHANGE UP

## 2024-11-26 PROCEDURE — 93306 TTE W/DOPPLER COMPLETE: CPT | Mod: 26

## 2024-11-26 PROCEDURE — 99222 1ST HOSP IP/OBS MODERATE 55: CPT

## 2024-11-26 PROCEDURE — 0042T: CPT

## 2024-11-26 PROCEDURE — 70496 CT ANGIOGRAPHY HEAD: CPT | Mod: 26,MC

## 2024-11-26 PROCEDURE — 71045 X-RAY EXAM CHEST 1 VIEW: CPT | Mod: 26

## 2024-11-26 PROCEDURE — 70450 CT HEAD/BRAIN W/O DYE: CPT | Mod: 26,MC,XU

## 2024-11-26 PROCEDURE — 70498 CT ANGIOGRAPHY NECK: CPT | Mod: 26,MC

## 2024-11-26 RX ORDER — GLUCAGON 3 MG/1
1 POWDER NASAL ONCE
Refills: 0 | Status: DISCONTINUED | OUTPATIENT
Start: 2024-11-26 | End: 2024-11-30

## 2024-11-26 RX ORDER — DEXTROSE 50 % IN WATER 50 %
25 SYRINGE (ML) INTRAVENOUS ONCE
Refills: 0 | Status: DISCONTINUED | OUTPATIENT
Start: 2024-11-26 | End: 2024-11-30

## 2024-11-26 RX ORDER — SODIUM CHLORIDE 9 G/1000ML
1000 INJECTION, SOLUTION INTRAVENOUS
Refills: 0 | Status: DISCONTINUED | OUTPATIENT
Start: 2024-11-26 | End: 2024-11-26

## 2024-11-26 RX ORDER — MAGNESIUM, ALUMINUM HYDROXIDE 200-200 MG
30 TABLET,CHEWABLE ORAL EVERY 4 HOURS
Refills: 0 | Status: DISCONTINUED | OUTPATIENT
Start: 2024-11-26 | End: 2024-11-30

## 2024-11-26 RX ORDER — DEXTROSE 50 % IN WATER 50 %
15 SYRINGE (ML) INTRAVENOUS ONCE
Refills: 0 | Status: DISCONTINUED | OUTPATIENT
Start: 2024-11-26 | End: 2024-11-30

## 2024-11-26 RX ORDER — ONDANSETRON HCL/PF 4 MG/2 ML
4 VIAL (ML) INJECTION EVERY 8 HOURS
Refills: 0 | Status: DISCONTINUED | OUTPATIENT
Start: 2024-11-26 | End: 2024-11-30

## 2024-11-26 RX ORDER — PREDNISONE 20 MG/1
5 TABLET ORAL DAILY
Refills: 0 | Status: DISCONTINUED | OUTPATIENT
Start: 2024-11-26 | End: 2024-11-30

## 2024-11-26 RX ORDER — LIDOCAINE HYDROCHLORIDE 20 MG/ML
1 JELLY TOPICAL DAILY
Refills: 0 | Status: DISCONTINUED | OUTPATIENT
Start: 2024-11-26 | End: 2024-11-30

## 2024-11-26 RX ORDER — INFLUENZA A VIRUS A/IDAHO/07/2018 (H1N1) ANTIGEN (MDCK CELL DERIVED, PROPIOLACTONE INACTIVATED, INFLUENZA A VIRUS A/INDIANA/08/2018 (H3N2) ANTIGEN (MDCK CELL DERIVED, PROPIOLACTONE INACTIVATED), INFLUENZA B VIRUS B/SINGAPORE/INFTT-16-0610/2016 ANTIGEN (MDCK CELL DERIVED, PROPIOLACTONE INACTIVATED), INFLUENZA B VIRUS B/IOWA/06/2017 ANTIGEN (MDCK CELL DERIVED, PROPIOLACTONE INACTIVATED) 15; 15; 15; 15 UG/.5ML; UG/.5ML; UG/.5ML; UG/.5ML
0.5 INJECTION, SUSPENSION INTRAMUSCULAR ONCE
Refills: 0 | Status: DISCONTINUED | OUTPATIENT
Start: 2024-11-26 | End: 2024-11-30

## 2024-11-26 RX ORDER — ACETAMINOPHEN 500 MG/5ML
650 LIQUID (ML) ORAL EVERY 6 HOURS
Refills: 0 | Status: DISCONTINUED | OUTPATIENT
Start: 2024-11-26 | End: 2024-11-30

## 2024-11-26 RX ORDER — METFORMIN HYDROCHLORIDE 850 MG/1
1000 TABLET ORAL
Refills: 0 | Status: DISCONTINUED | OUTPATIENT
Start: 2024-11-26 | End: 2024-11-26

## 2024-11-26 RX ORDER — DEXTROSE 50 % IN WATER 50 %
12.5 SYRINGE (ML) INTRAVENOUS ONCE
Refills: 0 | Status: DISCONTINUED | OUTPATIENT
Start: 2024-11-26 | End: 2024-11-30

## 2024-11-26 RX ORDER — MELATONIN 5 MG
3 TABLET ORAL AT BEDTIME
Refills: 0 | Status: DISCONTINUED | OUTPATIENT
Start: 2024-11-26 | End: 2024-11-30

## 2024-11-26 RX ORDER — ENOXAPARIN SODIUM 100 MG/ML
40 INJECTION SUBCUTANEOUS EVERY 24 HOURS
Refills: 0 | Status: DISCONTINUED | OUTPATIENT
Start: 2024-11-26 | End: 2024-11-30

## 2024-11-26 RX ORDER — SODIUM CHLORIDE 9 G/1000ML
1000 INJECTION, SOLUTION INTRAVENOUS
Refills: 0 | Status: DISCONTINUED | OUTPATIENT
Start: 2024-11-26 | End: 2024-11-30

## 2024-11-26 RX ORDER — IPRATROPIUM BROMIDE AND ALBUTEROL SULFATE .5; 2.5 MG/3ML; MG/3ML
3 SOLUTION RESPIRATORY (INHALATION) EVERY 6 HOURS
Refills: 0 | Status: DISCONTINUED | OUTPATIENT
Start: 2024-11-26 | End: 2024-11-30

## 2024-11-26 RX ORDER — CYCLOBENZAPRINE HYDROCHLORIDE 15 MG/1
10 CAPSULE, EXTENDED RELEASE ORAL THREE TIMES A DAY
Refills: 0 | Status: DISCONTINUED | OUTPATIENT
Start: 2024-11-26 | End: 2024-11-30

## 2024-11-26 RX ORDER — INSULIN LISPRO 100 U/ML
INJECTION, SOLUTION INTRAVENOUS; SUBCUTANEOUS
Refills: 0 | Status: DISCONTINUED | OUTPATIENT
Start: 2024-11-26 | End: 2024-11-30

## 2024-11-26 RX ADMIN — ENOXAPARIN SODIUM 40 MILLIGRAM(S): 100 INJECTION SUBCUTANEOUS at 08:57

## 2024-11-26 RX ADMIN — LIDOCAINE HYDROCHLORIDE 1 PATCH: 20 JELLY TOPICAL at 22:09

## 2024-11-26 RX ADMIN — LIDOCAINE HYDROCHLORIDE 1 PATCH: 20 JELLY TOPICAL at 09:46

## 2024-11-26 RX ADMIN — PREDNISONE 5 MILLIGRAM(S): 20 TABLET ORAL at 09:46

## 2024-11-26 RX ADMIN — Medication 1000 MILLILITER(S): at 02:11

## 2024-11-26 RX ADMIN — INSULIN LISPRO 2: 100 INJECTION, SOLUTION INTRAVENOUS; SUBCUTANEOUS at 17:18

## 2024-11-26 RX ADMIN — INSULIN LISPRO 2: 100 INJECTION, SOLUTION INTRAVENOUS; SUBCUTANEOUS at 11:04

## 2024-11-26 RX ADMIN — Medication 1000 MILLIGRAM(S): at 01:10

## 2024-11-26 RX ADMIN — Medication 40 MILLIGRAM(S): at 11:04

## 2024-11-26 RX ADMIN — Medication 50 MILLILITER(S): at 08:57

## 2024-11-26 RX ADMIN — Medication 1000 MILLIGRAM(S): at 01:30

## 2024-11-26 RX ADMIN — IPRATROPIUM BROMIDE AND ALBUTEROL SULFATE 3 MILLILITER(S): .5; 2.5 SOLUTION RESPIRATORY (INHALATION) at 09:21

## 2024-11-26 RX ADMIN — Medication 1000 MILLILITER(S): at 01:11

## 2024-11-26 RX ADMIN — Medication 400 MILLIGRAM(S): at 00:54

## 2024-11-27 LAB
A1C WITH ESTIMATED AVERAGE GLUCOSE RESULT: 10.2 % — HIGH (ref 4–5.6)
ALBUMIN SERPL ELPH-MCNC: 3.4 G/DL — SIGNIFICANT CHANGE UP (ref 3.3–5)
ALP SERPL-CCNC: 63 U/L — SIGNIFICANT CHANGE UP (ref 40–120)
ALT FLD-CCNC: 24 U/L — SIGNIFICANT CHANGE UP (ref 12–78)
ANION GAP SERPL CALC-SCNC: 4 MMOL/L — LOW (ref 5–17)
AST SERPL-CCNC: 9 U/L — LOW (ref 15–37)
BASOPHILS # BLD AUTO: 0.1 K/UL — SIGNIFICANT CHANGE UP (ref 0–0.2)
BASOPHILS NFR BLD AUTO: 1.4 % — SIGNIFICANT CHANGE UP (ref 0–2)
BILIRUB SERPL-MCNC: 0.4 MG/DL — SIGNIFICANT CHANGE UP (ref 0.2–1.2)
BUN SERPL-MCNC: 13 MG/DL — SIGNIFICANT CHANGE UP (ref 7–23)
CALCIUM SERPL-MCNC: 9.1 MG/DL — SIGNIFICANT CHANGE UP (ref 8.5–10.1)
CHLORIDE SERPL-SCNC: 108 MMOL/L — SIGNIFICANT CHANGE UP (ref 96–108)
CHOLEST SERPL-MCNC: 262 MG/DL — HIGH
CO2 SERPL-SCNC: 27 MMOL/L — SIGNIFICANT CHANGE UP (ref 22–31)
CREAT SERPL-MCNC: 0.69 MG/DL — SIGNIFICANT CHANGE UP (ref 0.5–1.3)
EGFR: 93 ML/MIN/1.73M2 — SIGNIFICANT CHANGE UP
EGFR: 93 ML/MIN/1.73M2 — SIGNIFICANT CHANGE UP
EOSINOPHIL # BLD AUTO: 0.62 K/UL — HIGH (ref 0–0.5)
EOSINOPHIL NFR BLD AUTO: 8.7 % — HIGH (ref 0–6)
ESTIMATED AVERAGE GLUCOSE: 246 MG/DL — HIGH (ref 68–114)
GLUCOSE SERPL-MCNC: 225 MG/DL — HIGH (ref 70–99)
HCT VFR BLD CALC: 42.3 % — SIGNIFICANT CHANGE UP (ref 34.5–45)
HDLC SERPL-MCNC: 57 MG/DL — SIGNIFICANT CHANGE UP
HGB BLD-MCNC: 13.9 G/DL — SIGNIFICANT CHANGE UP (ref 11.5–15.5)
IMM GRANULOCYTES NFR BLD AUTO: 0.1 % — SIGNIFICANT CHANGE UP (ref 0–0.9)
INR BLD: 0.9 RATIO — SIGNIFICANT CHANGE UP (ref 0.85–1.16)
LDLC SERPL-MCNC: 182 MG/DL — HIGH
LIPID PNL WITH DIRECT LDL SERPL: 182 MG/DL — HIGH
LYMPHOCYTES # BLD AUTO: 2.23 K/UL — SIGNIFICANT CHANGE UP (ref 1–3.3)
LYMPHOCYTES # BLD AUTO: 31.1 % — SIGNIFICANT CHANGE UP (ref 13–44)
MCHC RBC-ENTMCNC: 27.7 PG — SIGNIFICANT CHANGE UP (ref 27–34)
MCHC RBC-ENTMCNC: 32.9 G/DL — SIGNIFICANT CHANGE UP (ref 32–36)
MCV RBC AUTO: 84.4 FL — SIGNIFICANT CHANGE UP (ref 80–100)
MONOCYTES # BLD AUTO: 0.58 K/UL — SIGNIFICANT CHANGE UP (ref 0–0.9)
MONOCYTES NFR BLD AUTO: 8.1 % — SIGNIFICANT CHANGE UP (ref 2–14)
NEUTROPHILS # BLD AUTO: 3.62 K/UL — SIGNIFICANT CHANGE UP (ref 1.8–7.4)
NEUTROPHILS NFR BLD AUTO: 50.6 % — SIGNIFICANT CHANGE UP (ref 43–77)
NONHDLC SERPL-MCNC: 205 MG/DL — HIGH
NRBC # BLD: 0 /100 WBCS — SIGNIFICANT CHANGE UP (ref 0–0)
NRBC BLD-RTO: 0 /100 WBCS — SIGNIFICANT CHANGE UP (ref 0–0)
PLATELET # BLD AUTO: 258 K/UL — SIGNIFICANT CHANGE UP (ref 150–400)
POTASSIUM SERPL-MCNC: 4.1 MMOL/L — SIGNIFICANT CHANGE UP (ref 3.5–5.3)
POTASSIUM SERPL-SCNC: 4.1 MMOL/L — SIGNIFICANT CHANGE UP (ref 3.5–5.3)
PROT SERPL-MCNC: 6.6 G/DL — SIGNIFICANT CHANGE UP (ref 6–8.3)
PROTHROM AB SERPL-ACNC: 10.5 SEC — SIGNIFICANT CHANGE UP (ref 9.9–13.4)
RBC # BLD: 5.01 M/UL — SIGNIFICANT CHANGE UP (ref 3.8–5.2)
RBC # FLD: 12.4 % — SIGNIFICANT CHANGE UP (ref 10.3–14.5)
SODIUM SERPL-SCNC: 139 MMOL/L — SIGNIFICANT CHANGE UP (ref 135–145)
TRIGL SERPL-MCNC: 127 MG/DL — SIGNIFICANT CHANGE UP
TSH SERPL-MCNC: 0.37 UIU/ML — SIGNIFICANT CHANGE UP (ref 0.36–3.74)
WBC # BLD: 7.16 K/UL — SIGNIFICANT CHANGE UP (ref 3.8–10.5)
WBC # FLD AUTO: 7.16 K/UL — SIGNIFICANT CHANGE UP (ref 3.8–10.5)

## 2024-11-27 PROCEDURE — 99232 SBSQ HOSP IP/OBS MODERATE 35: CPT

## 2024-11-27 RX ORDER — LORAZEPAM 4 MG/ML
0.5 VIAL (ML) INJECTION ONCE
Refills: 0 | Status: DISCONTINUED | OUTPATIENT
Start: 2024-11-27 | End: 2024-11-30

## 2024-11-27 RX ADMIN — INSULIN LISPRO 2: 100 INJECTION, SOLUTION INTRAVENOUS; SUBCUTANEOUS at 08:45

## 2024-11-27 RX ADMIN — LIDOCAINE HYDROCHLORIDE 1 PATCH: 20 JELLY TOPICAL at 19:30

## 2024-11-27 RX ADMIN — PREDNISONE 5 MILLIGRAM(S): 20 TABLET ORAL at 05:53

## 2024-11-27 RX ADMIN — LIDOCAINE HYDROCHLORIDE 1 PATCH: 20 JELLY TOPICAL at 12:07

## 2024-11-27 RX ADMIN — INSULIN LISPRO 3: 100 INJECTION, SOLUTION INTRAVENOUS; SUBCUTANEOUS at 12:05

## 2024-11-27 RX ADMIN — ENOXAPARIN SODIUM 40 MILLIGRAM(S): 100 INJECTION SUBCUTANEOUS at 08:45

## 2024-11-27 RX ADMIN — Medication 40 MILLIGRAM(S): at 12:06

## 2024-11-27 RX ADMIN — Medication 50 MILLILITER(S): at 08:46

## 2024-11-27 RX ADMIN — INSULIN LISPRO 2: 100 INJECTION, SOLUTION INTRAVENOUS; SUBCUTANEOUS at 17:33

## 2024-11-28 LAB
ANION GAP SERPL CALC-SCNC: 3 MMOL/L — LOW (ref 5–17)
BUN SERPL-MCNC: 13 MG/DL — SIGNIFICANT CHANGE UP (ref 7–23)
CALCIUM SERPL-MCNC: 9.8 MG/DL — SIGNIFICANT CHANGE UP (ref 8.5–10.1)
CHLORIDE SERPL-SCNC: 105 MMOL/L — SIGNIFICANT CHANGE UP (ref 96–108)
CO2 SERPL-SCNC: 29 MMOL/L — SIGNIFICANT CHANGE UP (ref 22–31)
CREAT SERPL-MCNC: 0.78 MG/DL — SIGNIFICANT CHANGE UP (ref 0.5–1.3)
EGFR: 81 ML/MIN/1.73M2 — SIGNIFICANT CHANGE UP
EGFR: 81 ML/MIN/1.73M2 — SIGNIFICANT CHANGE UP
GLUCOSE SERPL-MCNC: 275 MG/DL — HIGH (ref 70–99)
HCT VFR BLD CALC: 43.3 % — SIGNIFICANT CHANGE UP (ref 34.5–45)
HGB BLD-MCNC: 14.2 G/DL — SIGNIFICANT CHANGE UP (ref 11.5–15.5)
MCHC RBC-ENTMCNC: 27.6 PG — SIGNIFICANT CHANGE UP (ref 27–34)
MCHC RBC-ENTMCNC: 32.8 G/DL — SIGNIFICANT CHANGE UP (ref 32–36)
MCV RBC AUTO: 84.1 FL — SIGNIFICANT CHANGE UP (ref 80–100)
NRBC # BLD: 0 /100 WBCS — SIGNIFICANT CHANGE UP (ref 0–0)
NRBC BLD-RTO: 0 /100 WBCS — SIGNIFICANT CHANGE UP (ref 0–0)
PLATELET # BLD AUTO: 262 K/UL — SIGNIFICANT CHANGE UP (ref 150–400)
POTASSIUM SERPL-MCNC: 4.2 MMOL/L — SIGNIFICANT CHANGE UP (ref 3.5–5.3)
POTASSIUM SERPL-SCNC: 4.2 MMOL/L — SIGNIFICANT CHANGE UP (ref 3.5–5.3)
RBC # BLD: 5.15 M/UL — SIGNIFICANT CHANGE UP (ref 3.8–5.2)
RBC # FLD: 12.4 % — SIGNIFICANT CHANGE UP (ref 10.3–14.5)
SODIUM SERPL-SCNC: 137 MMOL/L — SIGNIFICANT CHANGE UP (ref 135–145)
WBC # BLD: 7.5 K/UL — SIGNIFICANT CHANGE UP (ref 3.8–10.5)
WBC # FLD AUTO: 7.5 K/UL — SIGNIFICANT CHANGE UP (ref 3.8–10.5)

## 2024-11-28 PROCEDURE — 99232 SBSQ HOSP IP/OBS MODERATE 35: CPT

## 2024-11-28 RX ORDER — ASPIRIN 325 MG
81 TABLET ORAL DAILY
Refills: 0 | Status: DISCONTINUED | OUTPATIENT
Start: 2024-11-28 | End: 2024-11-30

## 2024-11-28 RX ADMIN — INSULIN LISPRO 2: 100 INJECTION, SOLUTION INTRAVENOUS; SUBCUTANEOUS at 17:01

## 2024-11-28 RX ADMIN — INSULIN LISPRO 2: 100 INJECTION, SOLUTION INTRAVENOUS; SUBCUTANEOUS at 12:32

## 2024-11-28 RX ADMIN — Medication 4 MILLIGRAM(S): at 16:15

## 2024-11-28 RX ADMIN — INSULIN LISPRO 3: 100 INJECTION, SOLUTION INTRAVENOUS; SUBCUTANEOUS at 08:23

## 2024-11-28 RX ADMIN — Medication 50 MILLILITER(S): at 21:07

## 2024-11-28 RX ADMIN — Medication 50 MILLILITER(S): at 12:32

## 2024-11-28 RX ADMIN — Medication 40 MILLIGRAM(S): at 12:32

## 2024-11-28 RX ADMIN — CYCLOBENZAPRINE HYDROCHLORIDE 10 MILLIGRAM(S): 15 CAPSULE, EXTENDED RELEASE ORAL at 21:07

## 2024-11-28 RX ADMIN — LIDOCAINE HYDROCHLORIDE 1 PATCH: 20 JELLY TOPICAL at 00:05

## 2024-11-28 RX ADMIN — ENOXAPARIN SODIUM 40 MILLIGRAM(S): 100 INJECTION SUBCUTANEOUS at 08:52

## 2024-11-28 RX ADMIN — LIDOCAINE HYDROCHLORIDE 1 PATCH: 20 JELLY TOPICAL at 19:15

## 2024-11-28 RX ADMIN — Medication 3 MILLIGRAM(S): at 21:07

## 2024-11-28 RX ADMIN — LIDOCAINE HYDROCHLORIDE 1 PATCH: 20 JELLY TOPICAL at 12:32

## 2024-11-28 RX ADMIN — Medication 81 MILLIGRAM(S): at 12:33

## 2024-11-28 RX ADMIN — PREDNISONE 5 MILLIGRAM(S): 20 TABLET ORAL at 05:00

## 2024-11-29 ENCOUNTER — TRANSCRIPTION ENCOUNTER (OUTPATIENT)
Age: 69
End: 2024-11-29

## 2024-11-29 LAB
ANION GAP SERPL CALC-SCNC: 4 MMOL/L — LOW (ref 5–17)
BUN SERPL-MCNC: 15 MG/DL — SIGNIFICANT CHANGE UP (ref 7–23)
CALCIUM SERPL-MCNC: 9.6 MG/DL — SIGNIFICANT CHANGE UP (ref 8.5–10.1)
CHLORIDE SERPL-SCNC: 107 MMOL/L — SIGNIFICANT CHANGE UP (ref 96–108)
CO2 SERPL-SCNC: 28 MMOL/L — SIGNIFICANT CHANGE UP (ref 22–31)
CREAT SERPL-MCNC: 0.73 MG/DL — SIGNIFICANT CHANGE UP (ref 0.5–1.3)
EGFR: 88 ML/MIN/1.73M2 — SIGNIFICANT CHANGE UP
EGFR: 88 ML/MIN/1.73M2 — SIGNIFICANT CHANGE UP
GLUCOSE SERPL-MCNC: 155 MG/DL — HIGH (ref 70–99)
HCT VFR BLD CALC: 42.6 % — SIGNIFICANT CHANGE UP (ref 34.5–45)
HGB BLD-MCNC: 13.9 G/DL — SIGNIFICANT CHANGE UP (ref 11.5–15.5)
MCHC RBC-ENTMCNC: 27.5 PG — SIGNIFICANT CHANGE UP (ref 27–34)
MCHC RBC-ENTMCNC: 32.6 G/DL — SIGNIFICANT CHANGE UP (ref 32–36)
MCV RBC AUTO: 84.4 FL — SIGNIFICANT CHANGE UP (ref 80–100)
NRBC # BLD: 0 /100 WBCS — SIGNIFICANT CHANGE UP (ref 0–0)
NRBC BLD-RTO: 0 /100 WBCS — SIGNIFICANT CHANGE UP (ref 0–0)
PLATELET # BLD AUTO: 297 K/UL — SIGNIFICANT CHANGE UP (ref 150–400)
POTASSIUM SERPL-MCNC: 4.4 MMOL/L — SIGNIFICANT CHANGE UP (ref 3.5–5.3)
POTASSIUM SERPL-SCNC: 4.4 MMOL/L — SIGNIFICANT CHANGE UP (ref 3.5–5.3)
RBC # BLD: 5.05 M/UL — SIGNIFICANT CHANGE UP (ref 3.8–5.2)
RBC # FLD: 12.5 % — SIGNIFICANT CHANGE UP (ref 10.3–14.5)
SODIUM SERPL-SCNC: 139 MMOL/L — SIGNIFICANT CHANGE UP (ref 135–145)
WBC # BLD: 6.54 K/UL — SIGNIFICANT CHANGE UP (ref 3.8–10.5)
WBC # FLD AUTO: 6.54 K/UL — SIGNIFICANT CHANGE UP (ref 3.8–10.5)

## 2024-11-29 PROCEDURE — 71045 X-RAY EXAM CHEST 1 VIEW: CPT | Mod: 26

## 2024-11-29 PROCEDURE — 99232 SBSQ HOSP IP/OBS MODERATE 35: CPT

## 2024-11-29 PROCEDURE — 70551 MRI BRAIN STEM W/O DYE: CPT | Mod: 26

## 2024-11-29 RX ADMIN — CYCLOBENZAPRINE HYDROCHLORIDE 10 MILLIGRAM(S): 15 CAPSULE, EXTENDED RELEASE ORAL at 21:17

## 2024-11-29 RX ADMIN — Medication 50 MILLILITER(S): at 05:37

## 2024-11-29 RX ADMIN — ENOXAPARIN SODIUM 40 MILLIGRAM(S): 100 INJECTION SUBCUTANEOUS at 08:14

## 2024-11-29 RX ADMIN — LIDOCAINE HYDROCHLORIDE 1 PATCH: 20 JELLY TOPICAL at 11:09

## 2024-11-29 RX ADMIN — CYCLOBENZAPRINE HYDROCHLORIDE 10 MILLIGRAM(S): 15 CAPSULE, EXTENDED RELEASE ORAL at 05:23

## 2024-11-29 RX ADMIN — LIDOCAINE HYDROCHLORIDE 1 PATCH: 20 JELLY TOPICAL at 23:00

## 2024-11-29 RX ADMIN — Medication 81 MILLIGRAM(S): at 11:09

## 2024-11-29 RX ADMIN — LIDOCAINE HYDROCHLORIDE 1 PATCH: 20 JELLY TOPICAL at 00:20

## 2024-11-29 RX ADMIN — INSULIN LISPRO 1: 100 INJECTION, SOLUTION INTRAVENOUS; SUBCUTANEOUS at 08:13

## 2024-11-29 RX ADMIN — LIDOCAINE HYDROCHLORIDE 1 PATCH: 20 JELLY TOPICAL at 19:15

## 2024-11-29 RX ADMIN — Medication 3 MILLIGRAM(S): at 21:17

## 2024-11-29 RX ADMIN — PREDNISONE 5 MILLIGRAM(S): 20 TABLET ORAL at 05:23

## 2024-11-29 RX ADMIN — INSULIN LISPRO 3: 100 INJECTION, SOLUTION INTRAVENOUS; SUBCUTANEOUS at 12:52

## 2024-11-29 RX ADMIN — Medication 40 MILLIGRAM(S): at 11:09

## 2024-11-30 ENCOUNTER — TRANSCRIPTION ENCOUNTER (OUTPATIENT)
Age: 69
End: 2024-11-30

## 2024-11-30 VITALS
RESPIRATION RATE: 18 BRPM | HEART RATE: 74 BPM | OXYGEN SATURATION: 94 % | SYSTOLIC BLOOD PRESSURE: 112 MMHG | TEMPERATURE: 98 F | DIASTOLIC BLOOD PRESSURE: 70 MMHG

## 2024-11-30 LAB
ANION GAP SERPL CALC-SCNC: 7 MMOL/L — SIGNIFICANT CHANGE UP (ref 5–17)
BUN SERPL-MCNC: 18 MG/DL — SIGNIFICANT CHANGE UP (ref 7–23)
CALCIUM SERPL-MCNC: 9.4 MG/DL — SIGNIFICANT CHANGE UP (ref 8.5–10.1)
CHLORIDE SERPL-SCNC: 102 MMOL/L — SIGNIFICANT CHANGE UP (ref 96–108)
CO2 SERPL-SCNC: 29 MMOL/L — SIGNIFICANT CHANGE UP (ref 22–31)
CREAT SERPL-MCNC: 0.78 MG/DL — SIGNIFICANT CHANGE UP (ref 0.5–1.3)
EGFR: 81 ML/MIN/1.73M2 — SIGNIFICANT CHANGE UP
EGFR: 81 ML/MIN/1.73M2 — SIGNIFICANT CHANGE UP
GLUCOSE SERPL-MCNC: 219 MG/DL — HIGH (ref 70–99)
HCT VFR BLD CALC: 42.5 % — SIGNIFICANT CHANGE UP (ref 34.5–45)
HGB BLD-MCNC: 14 G/DL — SIGNIFICANT CHANGE UP (ref 11.5–15.5)
MCHC RBC-ENTMCNC: 27.9 PG — SIGNIFICANT CHANGE UP (ref 27–34)
MCHC RBC-ENTMCNC: 32.9 G/DL — SIGNIFICANT CHANGE UP (ref 32–36)
MCV RBC AUTO: 84.7 FL — SIGNIFICANT CHANGE UP (ref 80–100)
NRBC # BLD: 0 /100 WBCS — SIGNIFICANT CHANGE UP (ref 0–0)
NRBC BLD-RTO: 0 /100 WBCS — SIGNIFICANT CHANGE UP (ref 0–0)
PLATELET # BLD AUTO: 286 K/UL — SIGNIFICANT CHANGE UP (ref 150–400)
POTASSIUM SERPL-MCNC: 4.7 MMOL/L — SIGNIFICANT CHANGE UP (ref 3.5–5.3)
POTASSIUM SERPL-SCNC: 4.7 MMOL/L — SIGNIFICANT CHANGE UP (ref 3.5–5.3)
RBC # BLD: 5.02 M/UL — SIGNIFICANT CHANGE UP (ref 3.8–5.2)
RBC # FLD: 12.7 % — SIGNIFICANT CHANGE UP (ref 10.3–14.5)
SODIUM SERPL-SCNC: 138 MMOL/L — SIGNIFICANT CHANGE UP (ref 135–145)
WBC # BLD: 7.85 K/UL — SIGNIFICANT CHANGE UP (ref 3.8–10.5)
WBC # FLD AUTO: 7.85 K/UL — SIGNIFICANT CHANGE UP (ref 3.8–10.5)

## 2024-11-30 PROCEDURE — 70496 CT ANGIOGRAPHY HEAD: CPT | Mod: MC

## 2024-11-30 PROCEDURE — 85025 COMPLETE CBC W/AUTO DIFF WBC: CPT

## 2024-11-30 PROCEDURE — 0042T: CPT

## 2024-11-30 PROCEDURE — 80061 LIPID PANEL: CPT

## 2024-11-30 PROCEDURE — 99232 SBSQ HOSP IP/OBS MODERATE 35: CPT

## 2024-11-30 PROCEDURE — 83036 HEMOGLOBIN GLYCOSYLATED A1C: CPT

## 2024-11-30 PROCEDURE — 70450 CT HEAD/BRAIN W/O DYE: CPT | Mod: MC

## 2024-11-30 PROCEDURE — 93005 ELECTROCARDIOGRAM TRACING: CPT

## 2024-11-30 PROCEDURE — 85027 COMPLETE CBC AUTOMATED: CPT

## 2024-11-30 PROCEDURE — 82962 GLUCOSE BLOOD TEST: CPT

## 2024-11-30 PROCEDURE — 85610 PROTHROMBIN TIME: CPT

## 2024-11-30 PROCEDURE — 95816 EEG AWAKE AND DROWSY: CPT

## 2024-11-30 PROCEDURE — 70498 CT ANGIOGRAPHY NECK: CPT | Mod: MC

## 2024-11-30 PROCEDURE — 80053 COMPREHEN METABOLIC PANEL: CPT

## 2024-11-30 PROCEDURE — 96375 TX/PRO/DX INJ NEW DRUG ADDON: CPT

## 2024-11-30 PROCEDURE — 36415 COLL VENOUS BLD VENIPUNCTURE: CPT

## 2024-11-30 PROCEDURE — 85730 THROMBOPLASTIN TIME PARTIAL: CPT

## 2024-11-30 PROCEDURE — 80048 BASIC METABOLIC PNL TOTAL CA: CPT

## 2024-11-30 PROCEDURE — 94640 AIRWAY INHALATION TREATMENT: CPT

## 2024-11-30 PROCEDURE — 97161 PT EVAL LOW COMPLEX 20 MIN: CPT

## 2024-11-30 PROCEDURE — 99285 EMERGENCY DEPT VISIT HI MDM: CPT

## 2024-11-30 PROCEDURE — 93306 TTE W/DOPPLER COMPLETE: CPT

## 2024-11-30 PROCEDURE — 96361 HYDRATE IV INFUSION ADD-ON: CPT

## 2024-11-30 PROCEDURE — 96374 THER/PROPH/DIAG INJ IV PUSH: CPT

## 2024-11-30 PROCEDURE — 84443 ASSAY THYROID STIM HORMONE: CPT

## 2024-11-30 PROCEDURE — 70551 MRI BRAIN STEM W/O DYE: CPT | Mod: MC

## 2024-11-30 PROCEDURE — 84484 ASSAY OF TROPONIN QUANT: CPT

## 2024-11-30 PROCEDURE — 71045 X-RAY EXAM CHEST 1 VIEW: CPT

## 2024-11-30 RX ORDER — ACETAMINOPHEN 500 MG/5ML
2 LIQUID (ML) ORAL
Qty: 0 | Refills: 0 | DISCHARGE
Start: 2024-11-30

## 2024-11-30 RX ORDER — ATORVASTATIN CALCIUM 80 MG/1
40 TABLET, FILM COATED ORAL AT BEDTIME
Refills: 0 | Status: DISCONTINUED | OUTPATIENT
Start: 2024-11-30 | End: 2024-11-30

## 2024-11-30 RX ORDER — ASPIRIN 325 MG
1 TABLET ORAL
Qty: 0 | Refills: 0 | DISCHARGE
Start: 2024-11-30

## 2024-11-30 RX ORDER — CYCLOBENZAPRINE HYDROCHLORIDE 15 MG/1
1 CAPSULE, EXTENDED RELEASE ORAL
Qty: 0 | Refills: 0 | DISCHARGE
Start: 2024-11-30

## 2024-11-30 RX ORDER — ATORVASTATIN CALCIUM 80 MG/1
1 TABLET, FILM COATED ORAL
Qty: 14 | Refills: 0
Start: 2024-11-30 | End: 2024-12-13

## 2024-11-30 RX ORDER — ATORVASTATIN CALCIUM 80 MG/1
1 TABLET, FILM COATED ORAL
Qty: 0 | Refills: 0 | DISCHARGE
Start: 2024-11-30

## 2024-11-30 RX ORDER — CYCLOBENZAPRINE HYDROCHLORIDE 15 MG/1
1 CAPSULE, EXTENDED RELEASE ORAL
Qty: 15 | Refills: 0
Start: 2024-11-30 | End: 2024-12-04

## 2024-11-30 RX ADMIN — LIDOCAINE HYDROCHLORIDE 1 PATCH: 20 JELLY TOPICAL at 11:11

## 2024-11-30 RX ADMIN — Medication 40 MILLIGRAM(S): at 11:11

## 2024-11-30 RX ADMIN — Medication 81 MILLIGRAM(S): at 11:11

## 2024-11-30 RX ADMIN — ENOXAPARIN SODIUM 40 MILLIGRAM(S): 100 INJECTION SUBCUTANEOUS at 08:13

## 2024-11-30 RX ADMIN — INSULIN LISPRO 2: 100 INJECTION, SOLUTION INTRAVENOUS; SUBCUTANEOUS at 08:13

## 2024-11-30 RX ADMIN — PREDNISONE 5 MILLIGRAM(S): 20 TABLET ORAL at 05:04

## 2024-11-30 RX ADMIN — CYCLOBENZAPRINE HYDROCHLORIDE 10 MILLIGRAM(S): 15 CAPSULE, EXTENDED RELEASE ORAL at 05:04

## 2024-11-30 RX ADMIN — INSULIN LISPRO 3: 100 INJECTION, SOLUTION INTRAVENOUS; SUBCUTANEOUS at 12:19

## 2024-12-05 ENCOUNTER — APPOINTMENT (OUTPATIENT)
Dept: CARDIOLOGY | Facility: CLINIC | Age: 69
End: 2024-12-05

## 2025-01-08 ENCOUNTER — APPOINTMENT (OUTPATIENT)
Dept: CARDIOLOGY | Facility: CLINIC | Age: 70
End: 2025-01-08

## 2025-01-08 PROCEDURE — 93294 REM INTERROG EVL PM/LDLS PM: CPT

## 2025-01-08 PROCEDURE — 93296 REM INTERROG EVL PM/IDS: CPT

## 2025-01-13 ENCOUNTER — APPOINTMENT (OUTPATIENT)
Dept: CARDIOLOGY | Facility: CLINIC | Age: 70
End: 2025-01-13

## 2025-01-16 NOTE — ED PROVIDER NOTE - WR INTERPRETED BY 1
Patient Name: Anushka Ramires  MRN:[id number]  :1939    Referring Physician:   Tricia Barker MD  Fax: [phone]      Chief Complaint   Patient presents with    Office Visit     Today patient denies any chest pain, shortness of breath or dizziness.       Follow-up     30 day post TAVR       SUBJECTIVE:     HISTORY OF PRESENT ILLNESS:  Anushka Ramires is a 85 year old female who presents for evaluation of her aortic stenosis.    She underwent watchman with a 35 mm device on 10/17/2024 with good results.  Her last echocardiogram from 2023 shows at least moderate aortic stenosis with VTI of 0.2 1 aortic valve area 0.8 cm² and EF of 59%.  Patient scheduled for repeat echocardiogram today.    Patient has a history of leadless pacemaker 2023.  Patient recently presented to ER after she had a syncopal episode while sitting in a chair and drinking tea.  It was witnessed by her daughter who is a nurse.  Patient was unconscious for about 10 seconds.    Patient underwent TAVR with a 26 mm SALIMA valve on 2024.  She has been feeling significantly better since that time.      PAST MEDICAL HISTORY:  Past Medical History:   Diagnosis Date    Aortic stenosis     Mild    Arthritis     generalized    Atrial fibrillation  (CMD)     Atrial fibrillation with slow ventricular response  (CMD)     Benign essential hypertension     Colon polyps     Glaucoma     Left atrial enlargement     Long term current use of anticoagulant therapy     Malignant neoplasm  (CMD)     breast Right    Non-rheumatic mitral regurgitation     PONV (postoperative nausea and vomiting)     Presence of Watchman left atrial appendage closure device 10/17/2024    Rotator cuff disorder, left 10/2020    S/p TAVR (transcatheter aortic valve replacement), bioprosthetic 2024    Nielsen 26 mm Salima Ultra Resilia TAVR valve - right femoral artery access    Sepsis  (CMD)     2018       MEDICATIONS:  Current Outpatient Medications    Medication Sig    amLODIPine (NORVASC) 5 MG tablet Take 1 tablet by mouth daily.    aspirin (ECOTRIN) 81 MG EC tablet Take 1 tablet by mouth daily.    enalapril (VASOTEC) 5 MG tablet Take 1 tablet by mouth daily.    docusate sodium (COLACE) 100 MG capsule Take 100 mg by mouth daily as needed for Constipation.    acetaminophen (TYLENOL) 500 MG tablet Take 1,000 mg by mouth daily as needed for Pain.    traMADol (ULTRAM) 50 MG tablet Take 0.5-1 tablets by mouth 2 times daily as needed for Pain.    gabapentin (NEURONTIN) 100 MG capsule Take 100 mg by mouth at bedtime.    Carboxymethylcellulose Sodium (ARTIFICIAL TEARS OP) Place 1 drop into both eyes 4 times daily as needed (dry eyes).    B Complex Vitamins (vitamin B complex) tablet Take 1 tablet by mouth at bedtime.    Glucosamine HCl (GLUCOSAMINE PO) Take 1 tablet by mouth every morning.    latanoprost (XALATAN) 0.005 % ophthalmic solution Place 1 drop into both eyes at bedtime.    TEMAZepam (RESTORIL) 15 MG capsule Take 15 mg by mouth at bedtime.    Cholecalciferol (VITAMIN D3) 1000 units capsule Take 25 mcg by mouth every morning.     No current facility-administered medications for this visit.       ALLERGIES:  ALLERGIES:   Allergen Reactions    Morphine VOMITING     And related opioids       PAST SURGICAL HISTORY:  Past Surgical History:   Procedure Laterality Date    Anesth,knee arthroscopy Left     Bunionectomy      Cataract extraction, bilateral Bilateral 2015    with lenses    Colonoscopy      Fusion of knee Right 1947    as a child injury    Hysterectomy  2013    Joint replacement Right 2017    shoulder    Left atrial appendage occlusion  10/17/2024    Watchman FLX    Mastectomy Right 2002    lymph nodes removed    Pacemaker implant  01/2023    Shoulder surg proc unlisted Left 2020    replacement    Tavr iliofemoral-cv  12/18/2024    Nielsen 26 mm Salima Ultra Resilia TAVR valve - right femoral artery access    Total knee replacement Left 02/2024        FAMILY HISTORY:  Family History   Problem Relation Age of Onset    Tuberculosis Mother     Rheumatoid Arthritis Father     Hodgkin's Lymphoma Sister     COPD Brother        SOCIAL HISTORY:  Social History     Tobacco Use    Smoking status: Never    Smokeless tobacco: Never   Vaping Use    Vaping status: never used   Substance Use Topics    Alcohol use: Never    Drug use: Never       Review of Systems   Constitutional: Negative for chills, diaphoresis, fever and malaise/fatigue.   HENT: Negative.     Eyes: Negative.    Cardiovascular:  Negative except for what is listed in the HPI.   Respiratory: Negative except for what is listed in the HPI.   Endocrine: Negative.    Skin: Negative.    Musculoskeletal: Negative for joint pain.  Gastrointestinal: Negative for abdominal pain, nausea and vomiting.   Genitourinary: Negative for dysuria and hematuria.   Neurological: Negative for vertigo.   Psychiatric/Behavioral: Negative.     Allergic/Immunologic: Negative.     OBJECTIVE:     Vitals:    01/14/25 1312   BP: (!) 144/74   BP Location: [facility] - Left upper extremity   Patient Position: Sitting   Cuff Size: Regular   Pulse: 75   SpO2: 97%   Weight: 58.6 kg (129 lb 3 oz)       General: Alert and oriented in no apparent distress.  HEENT: No focal deficits.  Neck: No JVD, carotids 2+ no bruits.  Cardiac: Regular rate and rhythm, S1, S2 normal, no murmur, rub or gallop.  Lungs: Clear without wheezes, rales, rhonchi or dullness.  Normal excursions and effort.  Abdomen: Soft, non-tender.   Extremities: Without clubbing, cyanosis or edema.  Peripheral pulses are 2+.  Neurologic: Alert and oriented, normal affect.  Skin: Warm and dry.     DIAGNOSTIC STUDIES:     Labs:  CBC:   Recent Labs   Lab 01/14/25  1302 12/19/24  0427 12/10/24  1126 10/08/24  1338 08/29/24  1257   WBC 6.7 7.3 6.8 6.5 5.3   RBC 4.08 3.91* 4.16 4.15 4.25   HGB 12.8 12.1 12.7 12.8 13.4   HCT 40.5 38.0 39.6 40.9 42.2   MCV 99.3 97.2 95.2 98.6 99.3   MCHC 31.6*  31.8* 32.1 31.3* 31.8*   RDW-CV 13.9 13.8 13.7 13.2 14.2    183 231 255 225   Lymphocytes, Percent 18  --  21 17 17     CMP:  Recent Labs   Lab 01/14/25  1302 12/19/24  0427 12/10/24  1126 10/08/24  1338 08/29/24  1349 08/29/24  1257   Sodium 135 139 140 139  --  138   Potassium 4.6 4.3 3.7 4.0  --  4.5   Chloride 104 106 115* 109  --  106   Carbon Dioxide 23 25 22 27  --  27   BUN 23* 13 18 22*  --  18   Creatinine 0.79 0.88 0.62 0.90   < > 0.82   Glucose 157* 105* 85 121*  --  88   Albumin  --   --   --  3.7  --  3.9   GOT/AST  --   --   --  20  --  21   GPT  --   --   --  18  --  20   Alkaline Phosphatase  --   --   --  82  --  87   Bilirubin, Total  --   --   --  0.5  --  0.6    < > = values in this interval not displayed.     COAGULATION STUDIES:   Recent Labs   Lab 12/10/24  1126 10/18/24  0419 10/11/24  0000 10/08/24  1338   Protime- PT 11.5 11.4  --  43.8*   INR 1.1 1.1 2.3 4.6     TSH:    Lab Results   Component Value Date    TSH 1.202 01/05/2023     HbA1c: Last Lab A1C:  No results found for: \"HGBA1C\"    Last Point of Care A1C:  No results found for: \"LDWLBUIB0X\", \"5GLYH\"  LIPID PANEL: No results for input(s): \"CHOLESTEROL\", \"TRIGLYCERIDE\", \"HDL\", \"CALCLDL\" in the last 8765 hours.  NT-PRONBP:   Recent Labs   Lab 01/14/25  1302 12/10/24  1126   NT-proBNP 2,816* 2,778*       Imaging:  Results for orders placed or performed in visit on 01/14/25   Electrocardiogram 12-Lead   Result Value Ref Range    Ventricular Rate EKG/Min (BPM) 85     Atrial Rate (BPM) 70     QRS-Interval (MSEC) 168     QT-Interval (MSEC) 446     QTc 530     R Axis (Degrees) -68     T Axis (Degrees) 97     REPORT TEXT       Ventricular-paced rhythm  Abnormal ECG  When compared with ECG of  19-DEC-2024 06:18,  Vent. rate  has increased  BY  14 BPM         ECG's:   Atrial fibrillation. Ventricular paced.     TTE: Date: 1/9/2023   SUMMARY:     1. Left ventricle: The cavity size is normal. Wall thickness is normal.     Systolic function  is normal. The ejection fraction was measured by biplane     method of disks. Doppler parameters are consistent with abnormal left     ventricular relaxation and increased filling pressure (grade 2 diastolic     dysfunction). The ejection fraction is 59%.  2. Aortic valve: The annulus is moderately calcified. The valve is probably     trileaflet. The leaflets are severely sclerotic. Velocity is increased.     There is moderate stenosis. The mean systolic gradient is 21mm Hg. The LVOT     to aortic valve VTI ratio is 0.21. The valve area is 0.8cm^2.  3. Mitral valve: The valve is structurally normal. The annulus is normal. The     leaflets are normal thickness.  4. Left atrium: The atrium is dilated.  5. Right ventricle: The cavity size is normal. Wall thickness is normal.     Systolic function is normal. The RV pressure during systole is 35mm Hg.     Pacemaker lead noted in right ventricle.  6. Tricuspid valve: There is moderate regurgitation.  7. Pericardium, extracardiac: There is no pericardial effusion.     Device checks: 07/17/2024  Interpretation:  Battery longevity: 8.6years  Sensing: stable  Impedance: stable  Threshold: stable  :  97%  Advisory: [employer] advisory((SORAYA)  Device functioning within normal limits. Software update done per advisory  RTC 3 months          ASSESSMENT AND PLAN:     1. S/P TAVR (transcatheter aortic valve replacement)    2. Atrial fibrillation with slow ventricular response  (CMD)    3. Left atrial enlargement    4. Aortic stenosis, mild    5. Hypertensive heart disease with heart failure  (CMD)    6. Status post coronary artery stent placement      PLAN:  - Patient is NYHA Class I   - Echocardiogram reviewed with patient which shows well-functioning TAVR valve without any aortic insufficiency or paravalvular leak.  - Counseled about dental hygiene and antibiotics prior to any dental cleaning.  - Cardiac rehab and prerehab stress test.  - Continue current medications.     Orders  Placed This Encounter    NT proBNP    Basic Metabolic Panel    CBC with Automated Differential    CBC with Automated Differential (performable only)    SERVICE TO OUTPATIENT CARDIAC REHAB    Electrocardiogram 12-Lead    amLODIPine (NORVASC) 5 MG tablet    aspirin (ECOTRIN) 81 MG EC tablet    enalapril (VASOTEC) 5 MG tablet    STRESS TEST        Return in about 1 year (around 1/14/2026).    I spent a total of 40 minutes on the day of the visit.  This includes pre-charting, chart review, documenting, and referring/communicating with other health care professionals.      Derek Guan MD  Cardiology    Portions of this  note may have been created using the Dragon voice recognition system.  Errors in content may be related to improper recognition of the system.  Effort to review and correct the note has been made but irregularities may still be present.  Medication reconciliation was done but medications not prescribed by me may be inaccurate.   Dave Davis

## 2025-01-21 NOTE — ED ADULT NURSE NOTE - ED CARDIAC RATE
PCP: Nic Griffin MD    LAST OFFICE VISIT: 01/06/2024    LAST REFILL PER CHART:  Medication:atorvastatin (LIPITOR) 10 MG tablet   Ordered On:04/16/2024  Instructions:TAKE 1 TABLET BY MOUTH DAILY   Dispense:90 tablets  Refills:2      Future Appointments   Date Time Provider Department Center   2/11/2025  9:45 AM Garland Dumont MD [facility]   6/17/2025  8:00 AM IOC LAB VISIT [facility]   6/24/2025  9:00 AM Nic Griffin MD [facility]        normal

## 2025-04-09 ENCOUNTER — APPOINTMENT (OUTPATIENT)
Dept: CARDIOLOGY | Facility: CLINIC | Age: 70
End: 2025-04-09

## 2025-04-09 PROCEDURE — 93296 REM INTERROG EVL PM/IDS: CPT

## 2025-04-09 PROCEDURE — 93294 REM INTERROG EVL PM/LDLS PM: CPT

## 2025-05-31 ENCOUNTER — NON-APPOINTMENT (OUTPATIENT)
Age: 70
End: 2025-05-31

## 2025-06-02 ENCOUNTER — APPOINTMENT (OUTPATIENT)
Dept: CARDIOLOGY | Facility: CLINIC | Age: 70
End: 2025-06-02

## 2025-07-09 ENCOUNTER — NON-APPOINTMENT (OUTPATIENT)
Age: 70
End: 2025-07-09

## 2025-07-09 ENCOUNTER — APPOINTMENT (OUTPATIENT)
Dept: CARDIOLOGY | Facility: CLINIC | Age: 70
End: 2025-07-09
Payer: MEDICARE

## 2025-07-09 PROCEDURE — 93296 REM INTERROG EVL PM/IDS: CPT

## 2025-07-09 PROCEDURE — 93294 REM INTERROG EVL PM/LDLS PM: CPT

## 2025-07-14 NOTE — ED ADULT NURSE NOTE - NS PRO PASSIVE SMOKE EXP
Discharge Planning Assessment   Mahesh     Patient Name: Lisa Jay  MRN: [id number]  Today's Date: 7/14/2025    Admit Date: 7/13/2025    Plan: DC Plan: Anticipate routine home alone. Sister Cintia assists as needed.   Discharge Needs Assessment       Row Name 07/14/25 1434       Living Environment    People in Home alone    Unique Family Situation sister Cintia checks in frequently and assists as needed    Current Living Arrangements home    Potentially Unsafe Housing Conditions none    In the past 12 months has the electric, gas, oil, or water company threatened to shut off services in your home? No    Primary Care Provided by self    Provides Primary Care For no one, unable/limited ability to care for self;no one    Family Caregiver if Needed sibling(s)    Family Caregiver Names Sister Cintia Mcclain    Quality of Family Relationships helpful;involved;supportive    Able to Return to Prior Arrangements yes       Resource/Environmental Concerns    Resource/Environmental Concerns financial    Financial Concerns food, unable to afford;medicine, unable to afford    Transportation Concerns none       Transportation Needs    In the past 12 months, has lack of transportation kept you from medical appointments or from getting medications? no    In the past 12 months, has lack of transportation kept you from meetings, work, or from getting things needed for daily living? No       Food Insecurity    Within the past 12 months, you worried that your food would run out before you got the money to buy more. Sometimes    Within the past 12 months, the food you bought just didn't last and you didn't have money to get more. Sometimes       Transition Planning    Patient/Family Anticipates Transition to home    Patient/Family Anticipated Services at Transition none    Transportation Anticipated family or friend will provide       Discharge Needs Assessment    Readmission Within the Last 30 Days no previous admission in  last 30 days    Equipment Currently Used at Home rollator    Concerns to be Addressed basic needs;discharge planning    Do you want help finding or keeping work or a job? I do not need or want help    Do you want help with school or training? For example, starting or completing job training or getting a high school diploma, GED or equivalent No    Anticipated Changes Related to Illness none    Equipment Needed After Discharge none    Provided Post Acute Provider List? N/A    Provided Post Acute Provider Quality & Resource List? N/A    Patient's Choice of Community Agency(s) Has been to [facility] in the past    Current Discharge Risk physical impairment;lives alone                   Discharge Plan       Row Name 07/14/25 1436       Plan    Plan DC Plan: Anticipate routine home alone. Sister Cintia assists as needed.    Patient/Family in Agreement with Plan yes    Provided Post Acute Provider List? N/A    Provided Post Acute Provider Quality & Resource List? N/A    Plan Comments CM spoke with patient at bedside to discuss admission assessment and discharge planning. Patient confirms PCP and pharmacy. Patient is already enrolled in meds to bed program. Patient confirms difficulty affording medications and food at this time. Patient states it is cummulative cost of meds that is the issue. CM provided Good RX card and expained website to research different pharmacies for med costs. Terrence states she has been denied for [employer] benefits in the past. She is aware of local food webb, but states the food provided does not meet her Diabetes managment food needs. Patient denies any additional needs for services or DME at this time. Patient has rollator at bedside. Patient reports independent with ADL's at a moderate level and does not drive. Patient sister Cintia will provide transportation when ready for DC. CM reviewed chart for clinical updates. Endocrine planning morning labs. DC anticipated tomorrow 7/15/2025.DIAZ  will continue to follow for any additional needs. DC Barriers: Pending lab work.               Expected Discharge Date and Time       Expected Discharge Date Expected Discharge Time    Jul 15, 2025            Demographic Summary       Row Name 07/14/25 1433       General Information    Admission Type observation    Arrived From emergency department    Required Notices Provided Observation Status Notice    Referral Source admission list    Reason for Consult discharge planning    Preferred Language English       Contact Information    Permission Granted to Share Info With                    Functional Status       Row Name 07/14/25 1433       Functional Status    Usual Activity Tolerance moderate    Current Activity Tolerance moderate    Functional Status Comments Sister Cintia Mcclain assists with care and errands       Physical Activity    On average, how many days per week do you engage in moderate to strenuous exercise (like a brisk walk)? 5 days    On average, how many minutes do you engage in exercise at this level? 150+ min    Number of minutes of exercise per week 750       Functional Status, IADL    Medications independent    Meal Preparation assistive person    Housekeeping assistive person    Laundry assistive person    Shopping assistive person    If for any reason you need help with day-to-day activities such as bathing, preparing meals, shopping, managing finances, etc., do you get the help you need? I get all the help I need       Mental Status    General Appearance WDL WDL       Mental Status Summary    Recent Changes in Mental Status/Cognitive Functioning no changes       Employment/    Employment Status disabled    Current or Previous Occupation not applicable                 Misty Hopper RN     Office Phone: [phone]  Office Cell:     [phone]      Yes...

## 2025-07-22 ENCOUNTER — EMERGENCY (EMERGENCY)
Facility: HOSPITAL | Age: 70
LOS: 1 days | End: 2025-07-22
Attending: EMERGENCY MEDICINE | Admitting: EMERGENCY MEDICINE
Payer: MEDICARE

## 2025-07-22 VITALS
OXYGEN SATURATION: 99 % | RESPIRATION RATE: 18 BRPM | HEART RATE: 78 BPM | TEMPERATURE: 98 F | SYSTOLIC BLOOD PRESSURE: 127 MMHG | DIASTOLIC BLOOD PRESSURE: 78 MMHG

## 2025-07-22 VITALS
HEART RATE: 88 BPM | DIASTOLIC BLOOD PRESSURE: 82 MMHG | HEIGHT: 67 IN | RESPIRATION RATE: 17 BRPM | TEMPERATURE: 98 F | WEIGHT: 128.09 LBS | SYSTOLIC BLOOD PRESSURE: 133 MMHG | OXYGEN SATURATION: 97 %

## 2025-07-22 DIAGNOSIS — Z98.89 OTHER SPECIFIED POSTPROCEDURAL STATES: Chronic | ICD-10-CM

## 2025-07-22 DIAGNOSIS — Z90.49 ACQUIRED ABSENCE OF OTHER SPECIFIED PARTS OF DIGESTIVE TRACT: Chronic | ICD-10-CM

## 2025-07-22 PROCEDURE — 73130 X-RAY EXAM OF HAND: CPT | Mod: 26,RT

## 2025-07-22 PROCEDURE — 99283 EMERGENCY DEPT VISIT LOW MDM: CPT | Mod: 25

## 2025-07-22 PROCEDURE — 99285 EMERGENCY DEPT VISIT HI MDM: CPT | Mod: FS

## 2025-07-22 PROCEDURE — 73130 X-RAY EXAM OF HAND: CPT

## 2025-07-22 NOTE — ED PROVIDER NOTE - OBJECTIVE STATEMENT
71 yo female with h/o dm, cva presents to the ED c/o right hand pain and swelling to dorsal aspect x 2/3 days. Denies fever, chills, redness or wound to hand, LE weakness or paresthesias.

## 2025-07-22 NOTE — ED ADULT NURSE NOTE - OBJECTIVE STATEMENT
patient received in ED, A&O-4, Ambulate as base line, Patient is complaining of Right wrist pain ans swelling, its been 3-4 days now nothing was helping her. (possible hematoma or cyst) ,  Patient denies of any trauma or hit. patient have history of Asthma, stroke, arthritis, DM. patient denied of Having Headache, chest pain, SOB, Chills, Fever. patient is not on blood thinner.

## 2025-07-22 NOTE — ED PROVIDER NOTE - ATTENDING APP SHARED VISIT CONTRIBUTION OF CARE
70-year-old white female presents to the emergency department [facility] today complaining of 2 to 3 days of unexplained swelling to the dorsum of patient's right hand.  No definite trauma.  No fever no chills.  No numbness tingling weakness in affected hand.  Examination reveals a well-developed well-nourished older white female in no acute distress with minimally tender swelling without warmth or erythema dorsum right hand with no deformity.  Neurovascular intact right upper extremity.  Case requires thorough evaluation to be performed in an independent manner followed by imaging of hand.  Case reviewed in depth with PA I agree with plan and management.

## 2025-07-22 NOTE — ED ADULT NURSE NOTE - AS PAIN REST
Patient called stating she received a bill for a phentermine check-up visit  Only the overweight code was used to it was not covered  Can another code be added to this visit so it can be resubmitted? 5 (moderate pain)

## 2025-07-22 NOTE — ED PROVIDER NOTE - CLINICAL SUMMARY MEDICAL DECISION MAKING FREE TEXT BOX
70-year-old white female presents to the emergency department [facility] today complaining of 2 to 3 days of unexplained swelling to the dorsum of patient's right hand.  No definite trauma.  No fever no chills.  No numbness tingling weakness in affected hand.  Examination reveals a well-developed well-nourished older white female in no acute distress with minimally tender swelling without warmth or erythema dorsum right hand with no deformity.  Neurovascular intact right upper extremity.  Case requires thorough evaluation to be performed in an independent manner followed by imaging of hand.  Case reviewed in depth with PA I agree with plan and management.

## 2025-07-22 NOTE — ED ADULT NURSE NOTE - DOES PATIENT HAVE ADVANCE DIRECTIVE
Quality 110: Preventive Care And Screening: Influenza Immunization: Influenza immunization was not ordered or administered, reason not given No Quality 130: Documentation Of Current Medications In The Medical Record: Current Medications Documented Quality 431: Preventive Care And Screening: Unhealthy Alcohol Use - Screening: Patient not identified as an unhealthy alcohol user when screened for unhealthy alcohol use using a systematic screening method Quality 111:Pneumonia Vaccination Status For Older Adults: Pneumococcal Vaccination not Administered or Previously Received, Reason not Otherwise Specified Detail Level: Detailed Quality 134: Screening For Clinical Depression And Follow-Up Plan: The patient was screened for depression and the screen was negative and no follow up required Quality 402: Tobacco Use And Help With Quitting Among Adolescents: Patient screened for tobacco and never smoked Quality 47: Advance Care Plan: Advance care planning not documented, reason not otherwise specified. Quality 226: Preventive Care And Screening: Tobacco Use: Screening And Cessation Intervention: Patient screened for tobacco use and is an ex/non-smoker

## 2025-07-22 NOTE — ED PROVIDER NOTE - MUSCULOSKELETAL MINIMAL EXAM
+ swelling and TTP to dorsal aspect of hand. mild decreased ROM of right hand. radial pulses equal and intact bilaterally.

## 2025-07-22 NOTE — ED PROVIDER NOTE - CARE PROVIDER_API CALL
Tyler Rod)  Plastic Surgery  [address] [place] [address], [address]  [place] [address]  Phone: [phone]  Fax: [phone]  Follow Up Time: 1-3 Days

## 2025-07-22 NOTE — ED PROVIDER NOTE - PATIENT PORTAL LINK FT
You can access the FollowMyHealth Patient Portal offered by [facility] by registering at the following website: [email or web address] By joining [facility] [employer]’s FollowMyHealth portal, you will also be able to view your health information using other applications (apps) compatible with our system.

## 2025-07-22 NOTE — ED PROVIDER NOTE - DIFFERENTIAL DIAGNOSIS
Soft tissue swelling dorsum of the hand rule out fracture rule out hematoma rule out contusion Differential Diagnosis

## 2025-08-25 ENCOUNTER — EMERGENCY (EMERGENCY)
Facility: HOSPITAL | Age: 70
LOS: 1 days | End: 2025-08-25
Attending: STUDENT IN AN ORGANIZED HEALTH CARE EDUCATION/TRAINING PROGRAM | Admitting: STUDENT IN AN ORGANIZED HEALTH CARE EDUCATION/TRAINING PROGRAM
Payer: MEDICARE

## 2025-08-25 VITALS
SYSTOLIC BLOOD PRESSURE: 126 MMHG | HEART RATE: 90 BPM | OXYGEN SATURATION: 98 % | TEMPERATURE: 98 F | DIASTOLIC BLOOD PRESSURE: 74 MMHG | RESPIRATION RATE: 18 BRPM

## 2025-08-25 VITALS
WEIGHT: 136.03 LBS | SYSTOLIC BLOOD PRESSURE: 123 MMHG | TEMPERATURE: 98 F | RESPIRATION RATE: 18 BRPM | DIASTOLIC BLOOD PRESSURE: 74 MMHG | HEIGHT: 67 IN | OXYGEN SATURATION: 97 % | HEART RATE: 97 BPM

## 2025-08-25 DIAGNOSIS — Z98.89 OTHER SPECIFIED POSTPROCEDURAL STATES: Chronic | ICD-10-CM

## 2025-08-25 DIAGNOSIS — Z90.49 ACQUIRED ABSENCE OF OTHER SPECIFIED PARTS OF DIGESTIVE TRACT: Chronic | ICD-10-CM

## 2025-08-25 PROCEDURE — 73130 X-RAY EXAM OF HAND: CPT | Mod: 26,LT

## 2025-08-25 PROCEDURE — 99284 EMERGENCY DEPT VISIT MOD MDM: CPT | Mod: FS

## 2025-08-25 PROCEDURE — 73130 X-RAY EXAM OF HAND: CPT

## 2025-08-25 PROCEDURE — 99283 EMERGENCY DEPT VISIT LOW MDM: CPT | Mod: 25

## 2025-08-25 RX ORDER — HYDROCORTISONE 10 MG/G
1 CREAM TOPICAL
Qty: 1 | Refills: 0
Start: 2025-08-25 | End: 2025-08-31

## 2025-08-25 RX ORDER — CEPHALEXIN 250 MG/1
1 CAPSULE ORAL
Qty: 15 | Refills: 0
Start: 2025-08-25 | End: 2025-08-29

## 2025-08-25 RX ORDER — IBUPROFEN 200 MG
600 TABLET ORAL ONCE
Refills: 0 | Status: COMPLETED | OUTPATIENT
Start: 2025-08-25 | End: 2025-08-25

## 2025-08-25 RX ORDER — CEPHALEXIN 250 MG/1
500 CAPSULE ORAL ONCE
Refills: 0 | Status: COMPLETED | OUTPATIENT
Start: 2025-08-25 | End: 2025-08-25

## 2025-08-25 RX ORDER — IBUPROFEN 200 MG
1 TABLET ORAL
Qty: 28 | Refills: 0
Start: 2025-08-25 | End: 2025-08-31

## 2025-08-25 RX ADMIN — CEPHALEXIN 500 MILLIGRAM(S): 250 CAPSULE ORAL at 16:13

## 2025-08-25 RX ADMIN — Medication 600 MILLIGRAM(S): at 14:45

## 2025-08-26 ENCOUNTER — NON-APPOINTMENT (OUTPATIENT)
Age: 70
End: 2025-08-26

## 2025-08-28 ENCOUNTER — APPOINTMENT (OUTPATIENT)
Dept: DERMATOLOGY | Facility: CLINIC | Age: 70
End: 2025-08-28
Payer: MEDICARE

## 2025-08-28 ENCOUNTER — NON-APPOINTMENT (OUTPATIENT)
Age: 70
End: 2025-08-28

## 2025-08-28 VITALS — HEIGHT: 67 IN | WEIGHT: 136 LBS | BODY MASS INDEX: 21.35 KG/M2

## 2025-08-28 DIAGNOSIS — L82.1 OTHER SEBORRHEIC KERATOSIS: ICD-10-CM

## 2025-08-28 DIAGNOSIS — L30.9 DERMATITIS, UNSPECIFIED: ICD-10-CM

## 2025-08-28 PROCEDURE — 99204 OFFICE O/P NEW MOD 45 MIN: CPT

## 2025-08-28 RX ORDER — TRIAMCINOLONE ACETONIDE 1 MG/G
0.1 OINTMENT TOPICAL
Qty: 1 | Refills: 3 | Status: ACTIVE | COMMUNITY
Start: 2025-08-28 | End: 1900-01-01

## 2025-09-06 ENCOUNTER — RESULT REVIEW (OUTPATIENT)
Age: 70
End: 2025-09-06

## 2025-09-06 RX ORDER — OMEPRAZOLE 20 MG/1
1 CAPSULE, DELAYED RELEASE ORAL
Qty: 0 | Refills: 0 | DISCHARGE

## 2025-09-06 RX ORDER — ALBUTEROL SULFATE 2.5 MG/3ML
2 VIAL, NEBULIZER (ML) INHALATION
Refills: 0 | DISCHARGE

## 2025-09-08 ENCOUNTER — TRANSCRIPTION ENCOUNTER (OUTPATIENT)
Age: 70
End: 2025-09-08

## 2025-09-08 RX ORDER — METFORMIN HYDROCHLORIDE 850 MG/1
2 TABLET ORAL
Refills: 0 | DISCHARGE